# Patient Record
Sex: FEMALE | Race: WHITE | NOT HISPANIC OR LATINO | Employment: OTHER | URBAN - NONMETROPOLITAN AREA
[De-identification: names, ages, dates, MRNs, and addresses within clinical notes are randomized per-mention and may not be internally consistent; named-entity substitution may affect disease eponyms.]

---

## 2017-01-01 ENCOUNTER — INFUSION (OUTPATIENT)
Dept: ONCOLOGY | Facility: HOSPITAL | Age: 77
End: 2017-01-01

## 2017-01-01 ENCOUNTER — TELEPHONE (OUTPATIENT)
Dept: GYNECOLOGIC ONCOLOGY | Facility: CLINIC | Age: 77
End: 2017-01-01

## 2017-01-01 ENCOUNTER — APPOINTMENT (OUTPATIENT)
Dept: ONCOLOGY | Facility: HOSPITAL | Age: 77
End: 2017-01-01

## 2017-01-01 ENCOUNTER — OFFICE VISIT (OUTPATIENT)
Dept: GYNECOLOGIC ONCOLOGY | Facility: CLINIC | Age: 77
End: 2017-01-01

## 2017-01-01 ENCOUNTER — OFFICE VISIT (OUTPATIENT)
Dept: ONCOLOGY | Facility: CLINIC | Age: 77
End: 2017-01-01

## 2017-01-01 VITALS
OXYGEN SATURATION: 95 % | WEIGHT: 147.9 LBS | SYSTOLIC BLOOD PRESSURE: 134 MMHG | HEART RATE: 55 BPM | BODY MASS INDEX: 25.39 KG/M2 | TEMPERATURE: 97.6 F | RESPIRATION RATE: 18 BRPM | DIASTOLIC BLOOD PRESSURE: 75 MMHG

## 2017-01-01 VITALS
SYSTOLIC BLOOD PRESSURE: 134 MMHG | OXYGEN SATURATION: 92 % | HEART RATE: 73 BPM | WEIGHT: 148 LBS | DIASTOLIC BLOOD PRESSURE: 82 MMHG | RESPIRATION RATE: 20 BRPM | TEMPERATURE: 97 F | BODY MASS INDEX: 25.4 KG/M2

## 2017-01-01 VITALS
DIASTOLIC BLOOD PRESSURE: 73 MMHG | HEART RATE: 77 BPM | SYSTOLIC BLOOD PRESSURE: 116 MMHG | OXYGEN SATURATION: 94 % | RESPIRATION RATE: 18 BRPM | TEMPERATURE: 97.7 F | BODY MASS INDEX: 26.42 KG/M2 | WEIGHT: 153.9 LBS

## 2017-01-01 VITALS
BODY MASS INDEX: 25.11 KG/M2 | OXYGEN SATURATION: 93 % | HEART RATE: 60 BPM | DIASTOLIC BLOOD PRESSURE: 85 MMHG | TEMPERATURE: 98 F | RESPIRATION RATE: 20 BRPM | WEIGHT: 146.3 LBS | SYSTOLIC BLOOD PRESSURE: 128 MMHG

## 2017-01-01 DIAGNOSIS — T45.1X5A CHEMOTHERAPY-INDUCED PERIPHERAL NEUROPATHY (HCC): ICD-10-CM

## 2017-01-01 DIAGNOSIS — G62.0 CHEMOTHERAPY-INDUCED PERIPHERAL NEUROPATHY (HCC): ICD-10-CM

## 2017-01-01 DIAGNOSIS — C48.2 MALIGNANT NEOPLASM OF PERITONEUM (HCC): Primary | ICD-10-CM

## 2017-01-01 PROCEDURE — 96523 IRRIG DRUG DELIVERY DEVICE: CPT

## 2017-01-01 PROCEDURE — 25010000002 HEPARIN FLUSH (PORCINE) 100 UNIT/ML SOLUTION: Performed by: INTERNAL MEDICINE

## 2017-01-01 PROCEDURE — 99214 OFFICE O/P EST MOD 30 MIN: CPT | Performed by: INTERNAL MEDICINE

## 2017-01-01 PROCEDURE — 99213 OFFICE O/P EST LOW 20 MIN: CPT | Performed by: OBSTETRICS & GYNECOLOGY

## 2017-01-01 RX ORDER — SODIUM CHLORIDE 0.9 % (FLUSH) 0.9 %
10 SYRINGE (ML) INJECTION AS NEEDED
Status: CANCELLED | OUTPATIENT
Start: 2018-01-01

## 2017-01-01 RX ORDER — GABAPENTIN 300 MG/1
900 CAPSULE ORAL 3 TIMES DAILY
Qty: 270 CAPSULE | Refills: 3 | Status: ON HOLD | OUTPATIENT
Start: 2017-01-01 | End: 2018-01-01

## 2017-01-01 RX ORDER — SODIUM CHLORIDE 0.9 % (FLUSH) 0.9 %
10 SYRINGE (ML) INJECTION AS NEEDED
Status: DISCONTINUED | OUTPATIENT
Start: 2017-01-01 | End: 2017-01-01 | Stop reason: HOSPADM

## 2017-01-01 RX ORDER — SODIUM CHLORIDE 0.9 % (FLUSH) 0.9 %
10 SYRINGE (ML) INJECTION AS NEEDED
Status: CANCELLED | OUTPATIENT
Start: 2017-01-01

## 2017-01-01 RX ADMIN — HEPARIN SODIUM (PORCINE) LOCK FLUSH IV SOLN 100 UNIT/ML 500 UNITS: 100 SOLUTION at 11:15

## 2017-01-01 RX ADMIN — Medication 10 ML: at 13:39

## 2017-01-01 RX ADMIN — Medication 10 ML: at 11:15

## 2017-01-01 RX ADMIN — SODIUM CHLORIDE, PRESERVATIVE FREE 500 UNITS: 5 INJECTION INTRAVENOUS at 13:39

## 2017-01-03 ENCOUNTER — LAB (OUTPATIENT)
Dept: ONCOLOGY | Facility: CLINIC | Age: 77
End: 2017-01-03

## 2017-01-03 ENCOUNTER — OFFICE VISIT (OUTPATIENT)
Dept: ONCOLOGY | Facility: CLINIC | Age: 77
End: 2017-01-03

## 2017-01-03 ENCOUNTER — INFUSION (OUTPATIENT)
Dept: ONCOLOGY | Facility: HOSPITAL | Age: 77
End: 2017-01-03

## 2017-01-03 VITALS
OXYGEN SATURATION: 93 % | HEART RATE: 79 BPM | BODY MASS INDEX: 26.26 KG/M2 | WEIGHT: 153 LBS | TEMPERATURE: 97.9 F | DIASTOLIC BLOOD PRESSURE: 67 MMHG | RESPIRATION RATE: 18 BRPM | SYSTOLIC BLOOD PRESSURE: 102 MMHG

## 2017-01-03 DIAGNOSIS — C48.2 MALIGNANT NEOPLASM OF PERITONEUM (HCC): ICD-10-CM

## 2017-01-03 DIAGNOSIS — C48.2 MALIGNANT NEOPLASM OF PERITONEUM (HCC): Primary | ICD-10-CM

## 2017-01-03 DIAGNOSIS — C80.0 CARCINOMATOSIS (HCC): Primary | ICD-10-CM

## 2017-01-03 DIAGNOSIS — C48.2 CANCER OF PERITONEUM (HCC): ICD-10-CM

## 2017-01-03 LAB
ALBUMIN SERPL-MCNC: 4 G/DL (ref 3.4–4.8)
ALBUMIN/GLOB SERPL: 1.7 G/DL (ref 1.5–2.5)
ALP SERPL-CCNC: 89 U/L (ref 46–116)
ALT SERPL W P-5'-P-CCNC: 37 U/L (ref 10–36)
ANION GAP SERPL CALCULATED.3IONS-SCNC: 3.2 MMOL/L (ref 3.6–11.2)
AST SERPL-CCNC: 24 U/L (ref 10–30)
BASOPHILS # BLD AUTO: 0.01 10*3/MM3 (ref 0–0.3)
BASOPHILS NFR BLD AUTO: 0.2 % (ref 0–2)
BILIRUB SERPL-MCNC: 0.2 MG/DL (ref 0.2–1.8)
BUN BLD-MCNC: 24 MG/DL (ref 7–21)
BUN/CREAT SERPL: 22.2 (ref 7–25)
CALCIUM SPEC-SCNC: 8.2 MG/DL (ref 7.7–10)
CHLORIDE SERPL-SCNC: 107 MMOL/L (ref 99–112)
CO2 SERPL-SCNC: 29.8 MMOL/L (ref 24.3–31.9)
CREAT BLD-MCNC: 1.08 MG/DL (ref 0.43–1.29)
DEPRECATED RDW RBC AUTO: 53.6 FL (ref 37–54)
EOSINOPHIL # BLD AUTO: 0.02 10*3/MM3 (ref 0–0.7)
EOSINOPHIL NFR BLD AUTO: 0.4 % (ref 0–7)
ERYTHROCYTE [DISTWIDTH] IN BLOOD BY AUTOMATED COUNT: 16.4 % (ref 11.5–14.5)
GFR SERPL CREATININE-BSD FRML MDRD: 49 ML/MIN/1.73
GLOBULIN UR ELPH-MCNC: 2.3 GM/DL
GLUCOSE BLD-MCNC: 99 MG/DL (ref 70–110)
HCT VFR BLD AUTO: 32.7 % (ref 37–47)
HGB BLD-MCNC: 10.7 G/DL (ref 12–16)
IMM GRANULOCYTES # BLD: 0.12 10*3/MM3 (ref 0–0.03)
IMM GRANULOCYTES NFR BLD: 2.7 % (ref 0–0.5)
LYMPHOCYTES # BLD AUTO: 1.23 10*3/MM3 (ref 1–3)
LYMPHOCYTES NFR BLD AUTO: 27.6 % (ref 16–46)
MCH RBC QN AUTO: 30.5 PG (ref 27–33)
MCHC RBC AUTO-ENTMCNC: 32.7 G/DL (ref 33–37)
MCV RBC AUTO: 93.2 FL (ref 80–94)
MONOCYTES # BLD AUTO: 0.49 10*3/MM3 (ref 0.1–0.9)
MONOCYTES NFR BLD AUTO: 11 % (ref 0–12)
NEUTROPHILS # BLD AUTO: 2.58 10*3/MM3 (ref 1.4–6.5)
NEUTROPHILS NFR BLD AUTO: 58.1 % (ref 40–75)
OSMOLALITY SERPL CALC.SUM OF ELEC: 283.5 MOSM/KG (ref 273–305)
PLATELET # BLD AUTO: 227 10*3/MM3 (ref 130–400)
PMV BLD AUTO: 10.5 FL (ref 6–10)
POTASSIUM BLD-SCNC: 4.4 MMOL/L (ref 3.5–5.3)
PROT SERPL-MCNC: 6.3 G/DL (ref 6–8)
RBC # BLD AUTO: 3.51 10*6/MM3 (ref 4.2–5.4)
SODIUM BLD-SCNC: 140 MMOL/L (ref 135–153)
WBC NRBC COR # BLD: 4.45 10*3/MM3 (ref 4.5–12.5)

## 2017-01-03 PROCEDURE — 25010000002 HEPARIN FLUSH (PORCINE) 100 UNIT/ML SOLUTION: Performed by: INTERNAL MEDICINE

## 2017-01-03 PROCEDURE — 36591 DRAW BLOOD OFF VENOUS DEVICE: CPT | Performed by: NURSE PRACTITIONER

## 2017-01-03 PROCEDURE — 99214 OFFICE O/P EST MOD 30 MIN: CPT | Performed by: INTERNAL MEDICINE

## 2017-01-03 RX ORDER — SODIUM CHLORIDE 0.9 % (FLUSH) 0.9 %
10 SYRINGE (ML) INJECTION AS NEEDED
Status: CANCELLED | OUTPATIENT
Start: 2017-01-03

## 2017-01-03 RX ORDER — GABAPENTIN 300 MG/1
300 CAPSULE ORAL 3 TIMES DAILY
Qty: 90 CAPSULE | Refills: 5 | Status: SHIPPED | OUTPATIENT
Start: 2017-01-03 | End: 2017-01-01 | Stop reason: SDUPTHER

## 2017-01-03 RX ADMIN — HEPARIN SODIUM (PORCINE) LOCK FLUSH IV SOLN 100 UNIT/ML 500 UNITS: 100 SOLUTION at 12:25

## 2017-01-03 NOTE — PROGRESS NOTES
Name:  Aminta Yoo  :  1940  Date:  1/3/2017     REFERRING PHYSICIAN  Win Us MD    PRIMARY CARE PROVIDER   Parisa Daniels, RICARDO    REASON FOR FOLLOWUP  1. Malignant neoplasm of peritoneum      CHIEF COMPLAINT  Occasional abdominal pain, still manageable with prn Norco. Worsening fatigue and leg pains since starting the second cycle of chemotherapy.    Dear MsElo CoyZoey,    HISTORY OF PRESENT ILLNESS:   I saw Ms. Yoo in follow up today in our medical oncology clinic. As you are aware, she is a pleasant, 76 y.o., white female with a history of a (presumably benign) brain tumor (status post resection in ~) who was in her usual state of health until late summer 2016 when she started to feel increasingly full/swollen and began to experience occasional abdominal pains/discomfort. An abdominal CT scan in late 2016 identified numerous deposits of probable tumor in the mesentery consistent with carcinomatosis, and she was referred to local surgery. Endoscopies were performed that were unremarkable and, between this and a  level of > 3000, it was felt that a gynecologic malignancy (likely ovarian or primary peritoneal) was the probable underlying diagnosis. A CT-guided biopsy of some abdominal wall soft tissue in late 2016 confirmed this. She was subsequently evaluated by gynecologic oncology in [place] (who may eventually attempt a debulking laparotomy; but, initial therapy with [id number] carbo/taxol was recommended first). The patient was agreeable and began this treatment on 2016.    INTERIM HISTORY:  Ms. Yoo returns to clinic today for followup again accompanied by her . She began chemotherapy in mid-2016 and has overall been tolerating it fairly well. She does report today that, with the second, [id number] treatment, she has been getting progressively fatigued; and her legs have been bothering her more (with aches and  restlessness). She otherwise has no other complaints.    Past Medical History   Diagnosis Date   • Arthritis    • Cancer      brain tumor   • Chronic constipation    • Chronic narcotic use      Arthritis   • Chronic renal insufficiency    • Depression    • GERD (gastroesophageal reflux disease)    • Ovarian cancer        Past Surgical History   Procedure Laterality Date   • Brain tumor excision  2000     Cancerous. Treated with radiation after surgery.    • Back surgery  2012   • Colonoscopy N/A 9/30/2016     Procedure: COLONOSCOPY;  Surgeon: Win Us MD;  Location: [facility];  Service:    • Endoscopy N/A 9/30/2016     Procedure: ESOPHAGOGASTRODUODENOSCOPY;  Surgeon: Win Us MD;  Location: [facility];  Service:    • Total abdominal hysterectomy  1977     Benign disease.  One ovary remains in situ.   • Portacath placement N/A 11/4/2016     Procedure: INSERTION OF PORTACATH;  Surgeon: Win Us MD;  Location: [facility];  Service:        Social History     Social History   • Marital status:      Spouse name: N/A   • Number of children: N/A   • Years of education: N/A     Occupational History   • Not on file.     Social History Main Topics   • Smoking status: Former Smoker     Packs/day: 1.00     Years: 20.00     Quit date: 9/29/1996   • Smokeless tobacco: Never Used   • Alcohol use No   • Drug use: No   • Sexual activity: Defer     Other Topics Concern   • Not on file     Social History Narrative       Family History   Problem Relation Age of Onset   • Heart disease Mother    • Hypertension Mother    • Cancer Sister      Brain & Breast       Allergies   Allergen Reactions   • Sulfa Antibiotics Rash       Current Outpatient Prescriptions   Medication Sig Dispense Refill   • calcium acetate (PHOSLO) 667 MG capsule Take 667 mg by mouth 3 (Three) Times a Day With Meals.     • diclofenac (VOLTAREN) 75 MG EC tablet 2 (Two) Times a Day.     • FLUoxetine (PROzac) 10 MG capsule 10 mg Every Night.  0    • FLUoxetine (PROzac) 20 MG capsule Take 20 mg by mouth Daily.     • HYDROcodone-acetaminophen (NORCO) 7.5-325 MG per tablet Every 6 (Six) Hours As Needed.     • NEXIUM 40 MG capsule Every Morning Before Breakfast.     • ondansetron (ZOFRAN) 8 MG tablet Take 1 tablet by mouth Every 8 (Eight) Hours As Needed for nausea or vomiting. 30 tablet 5   • prochlorperazine (COMPAZINE) 10 MG tablet Take 1 tablet by mouth Every 6 (Six) Hours As Needed for nausea or vomiting. 30 tablet 5   • spironolactone (ALDACTONE) 25 MG tablet 25 mg 2 (Two) Times a Day.     • gabapentin (NEURONTIN) 300 MG capsule Take 1 capsule by mouth 3 (Three) Times a Day. 90 capsule 5     No current facility-administered medications for this visit.      Facility-Administered Medications Ordered in Other Visits   Medication Dose Route Frequency Provider Last Rate Last Dose   • heparin flush (porcine) 100 UNIT/ML injection 500 Units  500 Units Intravenous PRN T Jay Warner MD   500 Units at 01/03/17 1225       REVIEW OF SYSTEMS  CONSTITUTIONAL:  No fever, chills or night sweats. Progressive fatigue since starting chemotherapy, as per the HPI above.  EYES:  No blurry vision, diplopia or other vision changes.  ENT:  No hearing loss, nosebleeds or sore throat.  CARDIOVASCULAR:  No palpitations, arrhythmia, syncopal episodes or edema.  PULMONARY:  No hemoptysis, wheezing, chronic cough or shortness of breath.  GASTROINTESTINAL:  No nausea or vomiting.  Intermittent abdominal pains and intermittent constipation.  GENITOURINARY:  No hematuria, kidney stones or frequent urination.  MUSCULOSKELETAL:  Progressive bilateral leg pains, as per the HPI above.  INTEGUMENTARY: No rashes or pruritus.  ENDOCRINE:  No excessive thirst or hot flashes.  HEMATOLOGIC:  No history of free bleeding, spontaneous bleeding or clotting.  IMMUNOLOGIC:  No allergies or frequent infections.  NEUROLOGIC: No numbness, tingling, seizures or weakness.  PSYCHIATRIC:  No anxiety or  depression.    PHYSICAL EXAMINATION    Visit Vitals   • /67   • Pulse 79   • Temp 97.9 °F (36.6 °C) (Oral)   • Resp 18   • Wt 153 lb (69.4 kg)   • SpO2 93%   • BMI 26.26 kg/m2       GENERAL:  A well-developed, well-nourished, elderly, white female in no acute distress.  HEENT:  Pupils equally round and reactive to light.  Extraocular muscles intact. Positive alopecia, wearing a head scarf.  CARDIOVASCULAR:  Regular rate and rhythm.  No murmurs, gallops or rubs.  LUNGS:  Clear to auscultation bilaterally.  ABDOMEN:  Soft, nondistended with positive bowel sounds. Mild, diffuse tenderness to palpation.  EXTREMITIES:  No clubbing, cyanosis or edema bilaterally.  SKIN:  No rashes or petechiae. Powerport in place.  NEURO:  Cranial nerves grossly intact.  No focal deficits.  PSYCH:  Alert and oriented x3.    LABORATORY    Lab Results   Component Value Date    WBC 4.45 (L) 01/03/2017    HGB 10.7 (L) 01/03/2017    HCT 32.7 (L) 01/03/2017    MCV 93.2 01/03/2017     01/03/2017    NEUTROABS 2.58 01/03/2017       Lab Results   Component Value Date     01/03/2017    K 4.4 01/03/2017     01/03/2017    CO2 29.8 01/03/2017    BUN 24 (H) 01/03/2017    CREATININE 1.08 01/03/2017    GLUCOSE 99 01/03/2017    CALCIUM 8.2 01/03/2017    AST 24 01/03/2017    ALT 37 (H) 01/03/2017    ALKPHOS 89 01/03/2017    BILITOT 0.2 01/03/2017    PROTEINTOT 6.3 01/03/2017    ALBUMIN 4.00 01/03/2017      (12/27/2016): 370.3 U/mL   (11/18/2016): 2625.0 U/mL   (09/29/2016): 3277.0 U/mL    IMAGING  CT abdomen and pelvis with contrast (09/22/2016):  Impression: Findings most consistent with carcinomatosis of the mesentery with numerous deposits of probable tumor throughout the lower abdomen and pelvis. Pathologic sized adenopathy is also seen.    NM PET with fusion CT, skull base to mid-thigh (10/28/2016):  Impression: Abnormal PET fusion CT showing fairly intense hypermetabolic activity throughout the abnormal soft  tissue densities in the mesentery. This was the site that was previously biopsied. The SUV value was 7.6 (or 17.6?).    PATHOLOGY  Soft tissue, abdominal wall, needle core biopsies (10/25/2016):  Poorly differentiated carcinoma. The findings are supportive of the clinical impression of ovarian/primary peritoneal carcinoma.    IMPRESSION AND PLAN  Ms. Yoo is a 76 y.o., white female with:  1. Metastatic ovarian/primary peritoneal cancer: Initially felt to be the most likely diagnosis, given the patient's clinical presentation, imaging appearance, negative GI evaluation and extremely elevated  level (3277 U/mL on 09/29/2016); and, in late October 2016, confirmed via a CT-guided biopsy of some abdominal wall soft tissue. I have had multiple long discussions with the patient and her family regarding this diagnosis and its prognosis. They are aware that this disease is not likely to be curable; but, given her good performance status, it is treatable. A NM PET scan performed on 10/28/2016 showed no evidence of disease outside the peritoneum (although it did further suggest extensive disease within it). Once a powerport was placed, we began [id number] carboplatin/taxol (AUC 6 on day 1; 80 mg/m2 days 1,8,15) on 11/18/2016. She has received two full cycles of this regimen to date and is overall tolerating it fairly well; however, with her steadily increasing fatigue and issue #2 (see below), we will give her a week off from therapy at this time. Fortunately, despite her (hopefully manageable) side effects, the most recent repeat  level (~370 U/mL on 12/27/2016) has substantially decreased from priors (~3000+ U/mL in late September 2016), an encouraging sign that her treatment is providing her with measurable benefit. After the week break, we will restart the current treatment plan. We will see her back in clinic in one month (on day #1 of the potential fourth cycle), though at that time we will likely give her a  more extended break from therapy while she is referred back to her gyn/onc in [place] for reevaluation (and a possible exploratory/debukling laparotomy?).  2. 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 In addition to delaying the planned third cycle by one week, a Rx for neurontin 300 mg TID was provided today. Continue to monitor.  3. Abdominal pain: Secondary to issue #1. Still mild and manageable with prn Norco. Continue to monitor.  4. Constipation: Secondary to issue #1 and prn narcotics. Currently still improved with the use of stool softeners and prn laxatives. Continue to monitor.  The patient and her  were in agreement with these plans.    It is a pleasure to participate in Ms. Yoo's care.  Please do not hesitate to call with any questions or concerns that you may have.    A total of 30 minutes were spent coordinating this patient’s care in clinic today; 25 minutes of which were face-to-face with the patient and her , reviewing her interim medical history, discussing the results of recent lab work and counseling on the current treatment and followup plan.  All questions were answered to their satisfaction.    FOLLOW UP  Delay day #1 of cycle #3 of [id number] carboplatin/taxol (AUC 6 on day #1/ 80 mg/m2 on days #1,8,15) by one week. Return to our clinic in 1 month (on day #1 of the potential 4th cycle) with a CBC, CMP and repeat  level for another toxicity/symptom check. Re-refer to [place] gyn/onc at that time        This document was electronically signed by ADE Warner MD January 3, 2017 12:36 PM      CC: RICARDO Patel MD

## 2017-01-04 LAB — CANCER AG125 SERPL-ACNC: 282.5 U/ML (ref 0–38.1)

## 2017-01-05 DIAGNOSIS — C56.9 OVARIAN CANCER, UNSPECIFIED LATERALITY (HCC): ICD-10-CM

## 2017-01-10 ENCOUNTER — LAB (OUTPATIENT)
Dept: ONCOLOGY | Facility: CLINIC | Age: 77
End: 2017-01-10

## 2017-01-10 ENCOUNTER — INFUSION (OUTPATIENT)
Dept: ONCOLOGY | Facility: HOSPITAL | Age: 77
End: 2017-01-10

## 2017-01-10 VITALS
OXYGEN SATURATION: 90 % | DIASTOLIC BLOOD PRESSURE: 77 MMHG | SYSTOLIC BLOOD PRESSURE: 146 MMHG | HEART RATE: 79 BPM | WEIGHT: 154.8 LBS | TEMPERATURE: 97.7 F | RESPIRATION RATE: 16 BRPM | BODY MASS INDEX: 26.57 KG/M2

## 2017-01-10 DIAGNOSIS — C56.9 OVARIAN CANCER, UNSPECIFIED LATERALITY (HCC): Primary | ICD-10-CM

## 2017-01-10 DIAGNOSIS — C48.2 MALIGNANT NEOPLASM OF PERITONEUM (HCC): ICD-10-CM

## 2017-01-10 DIAGNOSIS — C56.9 OVARIAN CANCER, UNSPECIFIED LATERALITY (HCC): ICD-10-CM

## 2017-01-10 LAB
ALBUMIN SERPL-MCNC: 4 G/DL (ref 3.4–4.8)
ALBUMIN/GLOB SERPL: 1.6 G/DL (ref 1.5–2.5)
ALP SERPL-CCNC: 86 U/L (ref 46–116)
ALT SERPL W P-5'-P-CCNC: 29 U/L (ref 10–36)
ANION GAP SERPL CALCULATED.3IONS-SCNC: 5 MMOL/L (ref 3.6–11.2)
AST SERPL-CCNC: 21 U/L (ref 10–30)
BASOPHILS # BLD AUTO: 0.01 10*3/MM3 (ref 0–0.3)
BASOPHILS NFR BLD AUTO: 0.2 % (ref 0–2)
BILIRUB SERPL-MCNC: 0.3 MG/DL (ref 0.2–1.8)
BUN BLD-MCNC: 22 MG/DL (ref 7–21)
BUN/CREAT SERPL: 19.1 (ref 7–25)
CALCIUM SPEC-SCNC: 8.8 MG/DL (ref 7.7–10)
CHLORIDE SERPL-SCNC: 109 MMOL/L (ref 99–112)
CO2 SERPL-SCNC: 30 MMOL/L (ref 24.3–31.9)
CREAT BLD-MCNC: 1.15 MG/DL (ref 0.43–1.29)
DEPRECATED RDW RBC AUTO: 61.7 FL (ref 37–54)
EOSINOPHIL # BLD AUTO: 0.04 10*3/MM3 (ref 0–0.7)
EOSINOPHIL NFR BLD AUTO: 0.7 % (ref 0–7)
ERYTHROCYTE [DISTWIDTH] IN BLOOD BY AUTOMATED COUNT: 18.3 % (ref 11.5–14.5)
GFR SERPL CREATININE-BSD FRML MDRD: 46 ML/MIN/1.73
GLOBULIN UR ELPH-MCNC: 2.5 GM/DL
GLUCOSE BLD-MCNC: 89 MG/DL (ref 70–110)
HCT VFR BLD AUTO: 31.8 % (ref 37–47)
HGB BLD-MCNC: 10.3 G/DL (ref 12–16)
IMM GRANULOCYTES # BLD: 0.19 10*3/MM3 (ref 0–0.03)
IMM GRANULOCYTES NFR BLD: 3.5 % (ref 0–0.5)
LYMPHOCYTES # BLD AUTO: 1.23 10*3/MM3 (ref 1–3)
LYMPHOCYTES NFR BLD AUTO: 22.6 % (ref 16–46)
MCH RBC QN AUTO: 30.6 PG (ref 27–33)
MCHC RBC AUTO-ENTMCNC: 32.4 G/DL (ref 33–37)
MCV RBC AUTO: 94.4 FL (ref 80–94)
MONOCYTES # BLD AUTO: 1.06 10*3/MM3 (ref 0.1–0.9)
MONOCYTES NFR BLD AUTO: 19.5 % (ref 0–12)
NEUTROPHILS # BLD AUTO: 2.91 10*3/MM3 (ref 1.4–6.5)
NEUTROPHILS NFR BLD AUTO: 53.5 % (ref 40–75)
OSMOLALITY SERPL CALC.SUM OF ELEC: 289.6 MOSM/KG (ref 273–305)
PLATELET # BLD AUTO: 253 10*3/MM3 (ref 130–400)
PMV BLD AUTO: 10.1 FL (ref 6–10)
POTASSIUM BLD-SCNC: 4.8 MMOL/L (ref 3.5–5.3)
PROT SERPL-MCNC: 6.5 G/DL (ref 6–8)
RBC # BLD AUTO: 3.37 10*6/MM3 (ref 4.2–5.4)
SODIUM BLD-SCNC: 144 MMOL/L (ref 135–153)
WBC NRBC COR # BLD: 5.44 10*3/MM3 (ref 4.5–12.5)

## 2017-01-10 PROCEDURE — 25010000002 PALONOSETRON PER 25 MCG: Performed by: INTERNAL MEDICINE

## 2017-01-10 PROCEDURE — 96413 CHEMO IV INFUSION 1 HR: CPT

## 2017-01-10 PROCEDURE — 25010000002 CARBOPLATIN PER 50 MG: Performed by: INTERNAL MEDICINE

## 2017-01-10 PROCEDURE — 96417 CHEMO IV INFUS EACH ADDL SEQ: CPT

## 2017-01-10 PROCEDURE — 25010000002 HEPARIN FLUSH (PORCINE) 100 UNIT/ML SOLUTION: Performed by: INTERNAL MEDICINE

## 2017-01-10 PROCEDURE — 96375 TX/PRO/DX INJ NEW DRUG ADDON: CPT

## 2017-01-10 PROCEDURE — 25010000002 DEXAMETHASONE SODIUM PHOSPHATE 20 MG/5ML SOLUTION 5 ML VIAL: Performed by: INTERNAL MEDICINE

## 2017-01-10 PROCEDURE — 25010000002 DIPHENHYDRAMINE PER 50 MG: Performed by: INTERNAL MEDICINE

## 2017-01-10 PROCEDURE — 25010000002 PACLITAXEL PER 30 MG: Performed by: INTERNAL MEDICINE

## 2017-01-10 PROCEDURE — 96367 TX/PROPH/DG ADDL SEQ IV INF: CPT

## 2017-01-10 RX ORDER — FAMOTIDINE 10 MG/ML
20 INJECTION, SOLUTION INTRAVENOUS ONCE
Status: COMPLETED | OUTPATIENT
Start: 2017-01-10 | End: 2017-01-10

## 2017-01-10 RX ORDER — SODIUM CHLORIDE 9 MG/ML
250 INJECTION, SOLUTION INTRAVENOUS ONCE
Status: COMPLETED | OUTPATIENT
Start: 2017-01-10 | End: 2017-01-10

## 2017-01-10 RX ORDER — PALONOSETRON 0.05 MG/ML
0.25 INJECTION, SOLUTION INTRAVENOUS ONCE
Status: COMPLETED | OUTPATIENT
Start: 2017-01-10 | End: 2017-01-10

## 2017-01-10 RX ORDER — SODIUM CHLORIDE 0.9 % (FLUSH) 0.9 %
10 SYRINGE (ML) INJECTION AS NEEDED
Status: CANCELLED | OUTPATIENT
Start: 2017-01-17

## 2017-01-10 RX ADMIN — DIPHENHYDRAMINE HYDROCHLORIDE 25 MG: 50 INJECTION INTRAMUSCULAR; INTRAVENOUS at 11:53

## 2017-01-10 RX ADMIN — HEPARIN SODIUM (PORCINE) LOCK FLUSH IV SOLN 100 UNIT/ML 500 UNITS: 100 SOLUTION at 14:18

## 2017-01-10 RX ADMIN — SODIUM CHLORIDE 250 ML: 900 INJECTION, SOLUTION INTRAVENOUS at 11:44

## 2017-01-10 RX ADMIN — CARBOPLATIN 380 MG: 10 INJECTION, SOLUTION INTRAVENOUS at 13:33

## 2017-01-10 RX ADMIN — PALONOSETRON HYDROCHLORIDE 0.25 MG: 0.25 INJECTION INTRAVENOUS at 11:45

## 2017-01-10 RX ADMIN — FAMOTIDINE 20 MG: 10 INJECTION, SOLUTION INTRAVENOUS at 11:45

## 2017-01-10 RX ADMIN — PACLITAXEL 140 MG: 6 INJECTION, SOLUTION INTRAVENOUS at 12:31

## 2017-01-10 RX ADMIN — DEXAMETHASONE SODIUM PHOSPHATE 12 MG: 4 INJECTION, SOLUTION INTRAMUSCULAR; INTRAVENOUS at 12:11

## 2017-01-12 ENCOUNTER — TELEPHONE (OUTPATIENT)
Dept: ONCOLOGY | Facility: HOSPITAL | Age: 77
End: 2017-01-12

## 2017-01-12 NOTE — TELEPHONE ENCOUNTER
Called patient to follow up on chemotherapy treatment from Tuesday. Spoke with pts spouse José, he states that Aminta has been doing really good until this morning when she fell coming into the kitchen. He states that she is fine now. He states that she reports that she just got dizzy when she got up. José reports that he doesn't see in markings on her and she is not complaining of pain or injury. He states that she takes BP medication. He reports that she denies N/V/D. He states that she is eating and drinking without difficulty. Advised him to have her get up slowly from lying or sitting position. Advised him to please call if they anything or for any questions or concerns. He verbalized understanding. Dr. Warner aware.

## 2017-01-17 ENCOUNTER — LAB (OUTPATIENT)
Dept: ONCOLOGY | Facility: CLINIC | Age: 77
End: 2017-01-17

## 2017-01-17 ENCOUNTER — APPOINTMENT (OUTPATIENT)
Dept: ONCOLOGY | Facility: HOSPITAL | Age: 77
End: 2017-01-17

## 2017-01-17 ENCOUNTER — INFUSION (OUTPATIENT)
Dept: ONCOLOGY | Facility: HOSPITAL | Age: 77
End: 2017-01-17

## 2017-01-17 VITALS
WEIGHT: 151.8 LBS | BODY MASS INDEX: 26.06 KG/M2 | OXYGEN SATURATION: 95 % | RESPIRATION RATE: 20 BRPM | DIASTOLIC BLOOD PRESSURE: 84 MMHG | HEART RATE: 82 BPM | SYSTOLIC BLOOD PRESSURE: 133 MMHG | TEMPERATURE: 98.6 F

## 2017-01-17 DIAGNOSIS — C56.9 OVARIAN CANCER, UNSPECIFIED LATERALITY (HCC): Primary | ICD-10-CM

## 2017-01-17 DIAGNOSIS — C56.9 MALIGNANT NEOPLASM OF OVARY, UNSPECIFIED LATERALITY (HCC): Primary | ICD-10-CM

## 2017-01-17 DIAGNOSIS — C56.9 OVARIAN CANCER, UNSPECIFIED LATERALITY (HCC): ICD-10-CM

## 2017-01-17 DIAGNOSIS — C48.2 MALIGNANT NEOPLASM OF PERITONEUM (HCC): ICD-10-CM

## 2017-01-17 LAB
ALBUMIN SERPL-MCNC: 4.1 G/DL (ref 3.4–4.8)
ALBUMIN/GLOB SERPL: 1.9 G/DL (ref 1.5–2.5)
ALP SERPL-CCNC: 79 U/L (ref 46–116)
ALT SERPL W P-5'-P-CCNC: 41 U/L (ref 10–36)
ANION GAP SERPL CALCULATED.3IONS-SCNC: 10.7 MMOL/L (ref 3.6–11.2)
AST SERPL-CCNC: 23 U/L (ref 10–30)
BASOPHILS # BLD AUTO: 0.01 10*3/MM3 (ref 0–0.3)
BASOPHILS NFR BLD AUTO: 0.2 % (ref 0–2)
BILIRUB SERPL-MCNC: 0.3 MG/DL (ref 0.2–1.8)
BUN BLD-MCNC: 26 MG/DL (ref 7–21)
BUN/CREAT SERPL: 21.1 (ref 7–25)
CALCIUM SPEC-SCNC: 8.6 MG/DL (ref 7.7–10)
CHLORIDE SERPL-SCNC: 107 MMOL/L (ref 99–112)
CO2 SERPL-SCNC: 27.3 MMOL/L (ref 24.3–31.9)
CREAT BLD-MCNC: 1.23 MG/DL (ref 0.43–1.29)
DEPRECATED RDW RBC AUTO: 58.1 FL (ref 37–54)
EOSINOPHIL # BLD AUTO: 0.03 10*3/MM3 (ref 0–0.7)
EOSINOPHIL NFR BLD AUTO: 0.5 % (ref 0–7)
ERYTHROCYTE [DISTWIDTH] IN BLOOD BY AUTOMATED COUNT: 17.6 % (ref 11.5–14.5)
GFR SERPL CREATININE-BSD FRML MDRD: 42 ML/MIN/1.73
GLOBULIN UR ELPH-MCNC: 2.2 GM/DL
GLUCOSE BLD-MCNC: 87 MG/DL (ref 70–110)
HCT VFR BLD AUTO: 34.3 % (ref 37–47)
HGB BLD-MCNC: 10.8 G/DL (ref 12–16)
IMM GRANULOCYTES # BLD: 0.08 10*3/MM3 (ref 0–0.03)
IMM GRANULOCYTES NFR BLD: 1.4 % (ref 0–0.5)
LYMPHOCYTES # BLD AUTO: 1.28 10*3/MM3 (ref 1–3)
LYMPHOCYTES NFR BLD AUTO: 22.5 % (ref 16–46)
MCH RBC QN AUTO: 30.1 PG (ref 27–33)
MCHC RBC AUTO-ENTMCNC: 31.5 G/DL (ref 33–37)
MCV RBC AUTO: 95.5 FL (ref 80–94)
MONOCYTES # BLD AUTO: 0.42 10*3/MM3 (ref 0.1–0.9)
MONOCYTES NFR BLD AUTO: 7.4 % (ref 0–12)
NEUTROPHILS # BLD AUTO: 3.86 10*3/MM3 (ref 1.4–6.5)
NEUTROPHILS NFR BLD AUTO: 68 % (ref 40–75)
OSMOLALITY SERPL CALC.SUM OF ELEC: 292.8 MOSM/KG (ref 273–305)
PLATELET # BLD AUTO: 276 10*3/MM3 (ref 130–400)
PMV BLD AUTO: 10.5 FL (ref 6–10)
POTASSIUM BLD-SCNC: 4.4 MMOL/L (ref 3.5–5.3)
PROT SERPL-MCNC: 6.3 G/DL (ref 6–8)
RBC # BLD AUTO: 3.59 10*6/MM3 (ref 4.2–5.4)
SODIUM BLD-SCNC: 145 MMOL/L (ref 135–153)
WBC NRBC COR # BLD: 5.68 10*3/MM3 (ref 4.5–12.5)

## 2017-01-17 PROCEDURE — 96413 CHEMO IV INFUSION 1 HR: CPT

## 2017-01-17 PROCEDURE — 25010000002 HEPARIN FLUSH (PORCINE) 100 UNIT/ML SOLUTION: Performed by: INTERNAL MEDICINE

## 2017-01-17 PROCEDURE — 25010000002 DEXAMETHASONE SODIUM PHOSPHATE 20 MG/5ML SOLUTION 5 ML VIAL: Performed by: INTERNAL MEDICINE

## 2017-01-17 PROCEDURE — 25010000002 DIPHENHYDRAMINE PER 50 MG: Performed by: INTERNAL MEDICINE

## 2017-01-17 PROCEDURE — 96361 HYDRATE IV INFUSION ADD-ON: CPT

## 2017-01-17 PROCEDURE — 36415 COLL VENOUS BLD VENIPUNCTURE: CPT

## 2017-01-17 PROCEDURE — 25010000002 PACLITAXEL PER 30 MG: Performed by: INTERNAL MEDICINE

## 2017-01-17 PROCEDURE — 96375 TX/PRO/DX INJ NEW DRUG ADDON: CPT

## 2017-01-17 PROCEDURE — 96367 TX/PROPH/DG ADDL SEQ IV INF: CPT

## 2017-01-17 RX ORDER — FAMOTIDINE 10 MG/ML
20 INJECTION, SOLUTION INTRAVENOUS ONCE
Status: COMPLETED | OUTPATIENT
Start: 2017-01-17 | End: 2017-01-17

## 2017-01-17 RX ORDER — SODIUM CHLORIDE 9 MG/ML
250 INJECTION, SOLUTION INTRAVENOUS ONCE
Status: COMPLETED | OUTPATIENT
Start: 2017-01-17 | End: 2017-01-17

## 2017-01-17 RX ORDER — SODIUM CHLORIDE 0.9 % (FLUSH) 0.9 %
10 SYRINGE (ML) INJECTION AS NEEDED
Status: CANCELLED | OUTPATIENT
Start: 2017-01-24

## 2017-01-17 RX ADMIN — DEXAMETHASONE SODIUM PHOSPHATE 12 MG: 4 INJECTION, SOLUTION INTRAMUSCULAR; INTRAVENOUS at 11:38

## 2017-01-17 RX ADMIN — PACLITAXEL 140 MG: 6 INJECTION, SOLUTION INTRAVENOUS at 12:11

## 2017-01-17 RX ADMIN — DIPHENHYDRAMINE HYDROCHLORIDE 25 MG: 50 INJECTION INTRAMUSCULAR; INTRAVENOUS at 11:24

## 2017-01-17 RX ADMIN — HEPARIN SODIUM (PORCINE) LOCK FLUSH IV SOLN 100 UNIT/ML 500 UNITS: 100 SOLUTION at 13:30

## 2017-01-17 RX ADMIN — FAMOTIDINE 20 MG: 10 INJECTION, SOLUTION INTRAVENOUS at 11:19

## 2017-01-17 RX ADMIN — SODIUM CHLORIDE 250 ML: 9 INJECTION, SOLUTION INTRAVENOUS at 11:19

## 2017-01-17 NOTE — PROGRESS NOTES
GYN Oncology    Patient just started C3 of therapy and will have D15 on 1/24.  Typically I would consider interval debulk after 3 cycles.  I have notified Dr. Warner that I I would like to see her for repeat imaging, exam, and treatment planning discussion on either 1/27 or 1/30.  Orders placed.  Scan may be done closer to home if that is easier for her.  I'll have my office call her to coordinate the visit and her scan.     I tried calling the patient and her family at all of the available numbers and was unable to reach anyone today.  I will have my office continue trying.    Jailene Tran MD  01/17/17  2:12 PM

## 2017-01-18 ENCOUNTER — TELEPHONE (OUTPATIENT)
Dept: ONCOLOGY | Facility: HOSPITAL | Age: 77
End: 2017-01-18

## 2017-01-24 ENCOUNTER — LAB (OUTPATIENT)
Dept: ONCOLOGY | Facility: CLINIC | Age: 77
End: 2017-01-24

## 2017-01-24 ENCOUNTER — INFUSION (OUTPATIENT)
Dept: ONCOLOGY | Facility: HOSPITAL | Age: 77
End: 2017-01-24

## 2017-01-24 VITALS
WEIGHT: 150.9 LBS | BODY MASS INDEX: 25.9 KG/M2 | RESPIRATION RATE: 20 BRPM | DIASTOLIC BLOOD PRESSURE: 80 MMHG | TEMPERATURE: 98.4 F | OXYGEN SATURATION: 96 % | SYSTOLIC BLOOD PRESSURE: 138 MMHG | HEART RATE: 73 BPM

## 2017-01-24 DIAGNOSIS — C56.9 MALIGNANT NEOPLASM OF OVARY, UNSPECIFIED LATERALITY (HCC): Primary | ICD-10-CM

## 2017-01-24 DIAGNOSIS — C56.9 OVARIAN CANCER, UNSPECIFIED LATERALITY (HCC): ICD-10-CM

## 2017-01-24 DIAGNOSIS — C48.2 MALIGNANT NEOPLASM OF PERITONEUM (HCC): ICD-10-CM

## 2017-01-24 DIAGNOSIS — C56.9 OVARIAN CANCER, UNSPECIFIED LATERALITY (HCC): Primary | ICD-10-CM

## 2017-01-24 LAB
ALBUMIN SERPL-MCNC: 4 G/DL (ref 3.4–4.8)
ALBUMIN/GLOB SERPL: 1.9 G/DL (ref 1.5–2.5)
ALP SERPL-CCNC: 76 U/L (ref 46–116)
ALT SERPL W P-5'-P-CCNC: 30 U/L (ref 10–36)
ANION GAP SERPL CALCULATED.3IONS-SCNC: 8.5 MMOL/L (ref 3.6–11.2)
AST SERPL-CCNC: 22 U/L (ref 10–30)
BASOPHILS # BLD AUTO: 0.02 10*3/MM3 (ref 0–0.3)
BASOPHILS NFR BLD AUTO: 0.4 % (ref 0–2)
BILIRUB SERPL-MCNC: 0.3 MG/DL (ref 0.2–1.8)
BUN BLD-MCNC: 23 MG/DL (ref 7–21)
BUN/CREAT SERPL: 19.3 (ref 7–25)
CALCIUM SPEC-SCNC: 8.2 MG/DL (ref 7.7–10)
CHLORIDE SERPL-SCNC: 111 MMOL/L (ref 99–112)
CO2 SERPL-SCNC: 26.5 MMOL/L (ref 24.3–31.9)
CREAT BLD-MCNC: 1.19 MG/DL (ref 0.43–1.29)
DEPRECATED RDW RBC AUTO: 60.1 FL (ref 37–54)
EOSINOPHIL # BLD AUTO: 0.02 10*3/MM3 (ref 0–0.7)
EOSINOPHIL NFR BLD AUTO: 0.4 % (ref 0–7)
ERYTHROCYTE [DISTWIDTH] IN BLOOD BY AUTOMATED COUNT: 18.6 % (ref 11.5–14.5)
GFR SERPL CREATININE-BSD FRML MDRD: 44 ML/MIN/1.73
GLOBULIN UR ELPH-MCNC: 2.1 GM/DL
GLUCOSE BLD-MCNC: 101 MG/DL (ref 70–110)
HCT VFR BLD AUTO: 32.6 % (ref 37–47)
HGB BLD-MCNC: 10.3 G/DL (ref 12–16)
IMM GRANULOCYTES # BLD: 0.12 10*3/MM3 (ref 0–0.03)
IMM GRANULOCYTES NFR BLD: 2.6 % (ref 0–0.5)
LYMPHOCYTES # BLD AUTO: 1.26 10*3/MM3 (ref 1–3)
LYMPHOCYTES NFR BLD AUTO: 27.7 % (ref 16–46)
MCH RBC QN AUTO: 30.5 PG (ref 27–33)
MCHC RBC AUTO-ENTMCNC: 31.6 G/DL (ref 33–37)
MCV RBC AUTO: 96.4 FL (ref 80–94)
MONOCYTES # BLD AUTO: 0.46 10*3/MM3 (ref 0.1–0.9)
MONOCYTES NFR BLD AUTO: 10.1 % (ref 0–12)
NEUTROPHILS # BLD AUTO: 2.67 10*3/MM3 (ref 1.4–6.5)
NEUTROPHILS NFR BLD AUTO: 58.8 % (ref 40–75)
OSMOLALITY SERPL CALC.SUM OF ELEC: 294.4 MOSM/KG (ref 273–305)
PLATELET # BLD AUTO: 195 10*3/MM3 (ref 130–400)
PMV BLD AUTO: 10.3 FL (ref 6–10)
POTASSIUM BLD-SCNC: 4 MMOL/L (ref 3.5–5.3)
PROT SERPL-MCNC: 6.1 G/DL (ref 6–8)
RBC # BLD AUTO: 3.38 10*6/MM3 (ref 4.2–5.4)
SODIUM BLD-SCNC: 146 MMOL/L (ref 135–153)
WBC NRBC COR # BLD: 4.55 10*3/MM3 (ref 4.5–12.5)

## 2017-01-24 PROCEDURE — 25010000002 HEPARIN FLUSH (PORCINE) 100 UNIT/ML SOLUTION: Performed by: INTERNAL MEDICINE

## 2017-01-24 PROCEDURE — 96375 TX/PRO/DX INJ NEW DRUG ADDON: CPT

## 2017-01-24 PROCEDURE — 25010000002 DIPHENHYDRAMINE PER 50 MG: Performed by: INTERNAL MEDICINE

## 2017-01-24 PROCEDURE — 25010000002 PACLITAXEL PER 30 MG: Performed by: INTERNAL MEDICINE

## 2017-01-24 PROCEDURE — 96367 TX/PROPH/DG ADDL SEQ IV INF: CPT

## 2017-01-24 PROCEDURE — 96413 CHEMO IV INFUSION 1 HR: CPT

## 2017-01-24 PROCEDURE — 96361 HYDRATE IV INFUSION ADD-ON: CPT

## 2017-01-24 PROCEDURE — 25010000002 DEXAMETHASONE SODIUM PHOSPHATE 20 MG/5ML SOLUTION 5 ML VIAL: Performed by: INTERNAL MEDICINE

## 2017-01-24 PROCEDURE — 36415 COLL VENOUS BLD VENIPUNCTURE: CPT

## 2017-01-24 RX ORDER — FAMOTIDINE 10 MG/ML
20 INJECTION, SOLUTION INTRAVENOUS ONCE
Status: COMPLETED | OUTPATIENT
Start: 2017-01-24 | End: 2017-01-24

## 2017-01-24 RX ORDER — SODIUM CHLORIDE 9 MG/ML
250 INJECTION, SOLUTION INTRAVENOUS ONCE
Status: COMPLETED | OUTPATIENT
Start: 2017-01-24 | End: 2017-01-24

## 2017-01-24 RX ORDER — SODIUM CHLORIDE 0.9 % (FLUSH) 0.9 %
10 SYRINGE (ML) INJECTION AS NEEDED
Status: CANCELLED | OUTPATIENT
Start: 2017-01-31

## 2017-01-24 RX ADMIN — FAMOTIDINE 20 MG: 10 INJECTION, SOLUTION INTRAVENOUS at 11:09

## 2017-01-24 RX ADMIN — HEPARIN SODIUM (PORCINE) LOCK FLUSH IV SOLN 100 UNIT/ML 500 UNITS: 100 SOLUTION at 13:13

## 2017-01-24 RX ADMIN — SODIUM CHLORIDE 250 ML: 9 INJECTION, SOLUTION INTRAVENOUS at 11:08

## 2017-01-24 RX ADMIN — DEXAMETHASONE SODIUM PHOSPHATE 12 MG: 4 INJECTION, SOLUTION INTRAMUSCULAR; INTRAVENOUS at 11:30

## 2017-01-24 RX ADMIN — PACLITAXEL 140 MG: 6 INJECTION, SOLUTION INTRAVENOUS at 11:55

## 2017-01-24 RX ADMIN — DIPHENHYDRAMINE HYDROCHLORIDE 25 MG: 50 INJECTION INTRAMUSCULAR; INTRAVENOUS at 11:15

## 2017-01-25 ENCOUNTER — TELEPHONE (OUTPATIENT)
Dept: ONCOLOGY | Facility: HOSPITAL | Age: 77
End: 2017-01-25

## 2017-01-29 DIAGNOSIS — C56.9 OVARIAN CANCER, UNSPECIFIED LATERALITY (HCC): ICD-10-CM

## 2017-01-29 RX ORDER — PALONOSETRON 0.05 MG/ML
0.25 INJECTION, SOLUTION INTRAVENOUS ONCE
Status: CANCELLED | OUTPATIENT
Start: 2017-01-31

## 2017-01-29 RX ORDER — SODIUM CHLORIDE 9 MG/ML
250 INJECTION, SOLUTION INTRAVENOUS ONCE
Status: CANCELLED | OUTPATIENT
Start: 2017-01-31

## 2017-01-29 RX ORDER — FAMOTIDINE 10 MG/ML
20 INJECTION, SOLUTION INTRAVENOUS ONCE
Status: CANCELLED | OUTPATIENT
Start: 2017-02-06

## 2017-01-29 RX ORDER — SODIUM CHLORIDE 9 MG/ML
250 INJECTION, SOLUTION INTRAVENOUS ONCE
Status: CANCELLED | OUTPATIENT
Start: 2017-02-13

## 2017-01-29 RX ORDER — SODIUM CHLORIDE 9 MG/ML
250 INJECTION, SOLUTION INTRAVENOUS ONCE
Status: CANCELLED | OUTPATIENT
Start: 2017-02-06

## 2017-01-29 RX ORDER — FAMOTIDINE 10 MG/ML
20 INJECTION, SOLUTION INTRAVENOUS ONCE
Status: CANCELLED | OUTPATIENT
Start: 2017-02-13

## 2017-01-29 RX ORDER — FAMOTIDINE 10 MG/ML
20 INJECTION, SOLUTION INTRAVENOUS ONCE
Status: CANCELLED | OUTPATIENT
Start: 2017-01-31

## 2017-01-30 ENCOUNTER — LAB (OUTPATIENT)
Dept: LAB | Facility: HOSPITAL | Age: 77
End: 2017-01-30

## 2017-01-30 ENCOUNTER — OFFICE VISIT (OUTPATIENT)
Dept: GYNECOLOGIC ONCOLOGY | Facility: CLINIC | Age: 77
End: 2017-01-30

## 2017-01-30 ENCOUNTER — HOSPITAL ENCOUNTER (OUTPATIENT)
Dept: CT IMAGING | Facility: HOSPITAL | Age: 77
Discharge: HOME | End: 2017-01-30
Attending: OBSTETRICS & GYNECOLOGY | Admitting: OBSTETRICS & GYNECOLOGY

## 2017-01-30 VITALS
SYSTOLIC BLOOD PRESSURE: 147 MMHG | OXYGEN SATURATION: 98 % | BODY MASS INDEX: 26.09 KG/M2 | RESPIRATION RATE: 20 BRPM | WEIGHT: 152 LBS | DIASTOLIC BLOOD PRESSURE: 67 MMHG | TEMPERATURE: 97.6 F | HEART RATE: 93 BPM

## 2017-01-30 DIAGNOSIS — C48.2 MALIGNANT NEOPLASM OF PERITONEUM (HCC): Primary | ICD-10-CM

## 2017-01-30 DIAGNOSIS — C56.9 OVARIAN CANCER, UNSPECIFIED LATERALITY (HCC): ICD-10-CM

## 2017-01-30 LAB
BASOPHILS # BLD AUTO: 0.01 10*3/MM3 (ref 0–0.2)
BASOPHILS NFR BLD AUTO: 0.2 % (ref 0–1)
DEPRECATED RDW RBC AUTO: 65 FL (ref 37–54)
EOSINOPHIL # BLD AUTO: 0.01 10*3/MM3 (ref 0.1–0.3)
EOSINOPHIL NFR BLD AUTO: 0.2 % (ref 0–3)
ERYTHROCYTE [DISTWIDTH] IN BLOOD BY AUTOMATED COUNT: 18.7 % (ref 11.3–14.5)
HCT VFR BLD AUTO: 34 % (ref 34.5–44)
HGB BLD-MCNC: 11.2 G/DL (ref 11.5–15.5)
IMM GRANULOCYTES # BLD: 0.05 10*3/MM3 (ref 0–0.03)
IMM GRANULOCYTES NFR BLD: 1 % (ref 0–0.6)
LYMPHOCYTES # BLD AUTO: 1.45 10*3/MM3 (ref 0.6–4.8)
LYMPHOCYTES NFR BLD AUTO: 28.9 % (ref 24–44)
MCH RBC QN AUTO: 32.3 PG (ref 27–31)
MCHC RBC AUTO-ENTMCNC: 32.9 G/DL (ref 32–36)
MCV RBC AUTO: 98 FL (ref 80–99)
MONOCYTES # BLD AUTO: 0.24 10*3/MM3 (ref 0–1)
MONOCYTES NFR BLD AUTO: 4.8 % (ref 0–12)
NEUTROPHILS # BLD AUTO: 3.25 10*3/MM3 (ref 1.5–8.3)
NEUTROPHILS NFR BLD AUTO: 64.9 % (ref 41–71)
PLATELET # BLD AUTO: 169 10*3/MM3 (ref 150–450)
PMV BLD AUTO: 10.2 FL (ref 6–12)
RBC # BLD AUTO: 3.47 10*6/MM3 (ref 3.89–5.14)
WBC NRBC COR # BLD: 5.01 10*3/MM3 (ref 3.5–10.8)

## 2017-01-30 PROCEDURE — 85025 COMPLETE CBC W/AUTO DIFF WBC: CPT

## 2017-01-30 PROCEDURE — 99214 OFFICE O/P EST MOD 30 MIN: CPT | Performed by: OBSTETRICS & GYNECOLOGY

## 2017-01-30 PROCEDURE — 0 IOPAMIDOL 61 % SOLUTION: Performed by: OBSTETRICS & GYNECOLOGY

## 2017-01-30 PROCEDURE — 74177 CT ABD & PELVIS W/CONTRAST: CPT

## 2017-01-30 PROCEDURE — 36415 COLL VENOUS BLD VENIPUNCTURE: CPT

## 2017-01-30 RX ADMIN — IOPAMIDOL 50 ML: 612 INJECTION, SOLUTION INTRAVENOUS at 08:50

## 2017-01-30 RX ADMIN — BARIUM SULFATE 450 ML: 21 SUSPENSION ORAL at 07:40

## 2017-01-30 NOTE — MR AVS SNAPSHOT
Aminta Yoo   1/30/2017 1:00 PM   Office Visit    Dept Phone:  [phone]   Encounter #:  [id number]    Provider:  Jailene Tran MD   Department:  [facility] GYNECOLOGIC ONCOLOGY                Your Full Care Plan              Your Updated Medication List          This list is accurate as of: 1/30/17  1:15 PM.  Always use your most recent med list.                calcium acetate 667 MG capsule   Commonly known as:  PHOSLO       diclofenac 75 MG EC tablet   Commonly known as:  VOLTAREN       * FLUoxetine 10 MG capsule   Commonly known as:  PROzac       * FLUoxetine 20 MG capsule   Commonly known as:  PROzac       gabapentin 300 MG capsule   Commonly known as:  NEURONTIN   Take 1 capsule by mouth 3 (Three) Times a Day.       HYDROcodone-acetaminophen 7.5-325 MG per tablet   Commonly known as:  NORCO       NEXIUM 40 MG capsule   Generic drug:  esomeprazole       ondansetron 8 MG tablet   Commonly known as:  ZOFRAN   Take 1 tablet by mouth Every 8 (Eight) Hours As Needed for nausea or vomiting.       prochlorperazine 10 MG tablet   Commonly known as:  COMPAZINE   Take 1 tablet by mouth Every 6 (Six) Hours As Needed for nausea or vomiting.       spironolactone 25 MG tablet   Commonly known as:  ALDACTONE       * Notice:  This list has 2 medication(s) that are the same as other medications prescribed for you. Read the directions carefully, and ask your doctor or other care provider to review them with you.            You Were Diagnosed With        Codes Comments    Malignant neoplasm of peritoneum    -  Primary ICD-10-CM: C48.2  ICD-9-CM: 158.9       Instructions     None    Patient Instructions History      Upcoming Appointments     Visit Type Date Time Department    FOLLOW UP 1 UNIT 1/30/2017  1:00 PM [facility]    CT [facility] ABD PELVIS W CONTRAST 1/30/2017  8:00 AM  [facility]    LAB 1/30/2017  9:15 AM  [facility]    LAB 1/31/2017 10:30 AM [facility]     INFUSION 2 HR 1/31/2017 10:30 AM  [facility]    FOLLOW UP 2/7/2017 11:00 AM [facility]      MyChart Signup     Our records indicate that you have declined [facility] MyChart signup. If you would like to sign up for MyChart, please email [email or web address] or call [phone] to obtain an activation code.             Other Info from Your Visit           Your Appointments     Jan 31, 2017 10:30 AM EST   LAB with [facility] NURSE LAB   [facility] HEMATOLOGY  AND ONCOLOGY ([place])    [address] [place] [address] [place] [address]   [phone]            Jan 31, 2017 10:30 AM EST   INFUSION with  COR OP INFUS CHAIR 8   [facility] OUTPATIENT INFUSION ([place])    [address] [place] [address] [place] [address]   [phone]            Feb 07, 2017 11:00 AM EST   Follow Up with ADE Warner MD   [facility] HEMATOLOGY  AND ONCOLOGY ([place])    [address] [place] [address] [place] [address]   [phone]           Arrive 15 minutes prior to appointment.              Allergies     Sulfa Antibiotics  Rash      Reason for Visit     Follow-up ct talk      Vital Signs     Blood Pressure Pulse Temperature Respirations Weight Oxygen Saturation    147/67 93 97.6 °F (36.4 °C) (Temporal Artery ) 20 152 lb (68.9 kg) 98%    Body Mass Index Smoking Status                26.09 kg/m2 Former Smoker          Problems and Diagnoses Noted     Malignant neoplasm of peritoneum

## 2017-01-30 NOTE — PROGRESS NOTES
Subjective     Reason for visit:  Treatment planning     History of present illness:  The patient is a 77 y.o. female with clinical stage 3C primary peritoneal cancer.  Her treatment history is outlined below.  She is being treated with a neoadjuvant approach and is receiving chemotherapy with Dr. Warner of Hematology Oncology at [facility].  As of 1/24/16 she received C3D15 of dose dense Carbo/Taxol.  She presents today for a CT scan and treatment planning.    Since her last visit the patient and her family report she has tolerated chemotherapy reasonably well.  She notes dizziness and weakness at times although states this is improved with therapy.  She did have some minor issues with balance where she had a small fall in her kitchen.  She has since been using a walker with good effect.  She reports weight loss.  Less appetite.  Occasional nausea.  No vomiting.  Normal bladder function.  Mild constipation.  Mild vaginal discharge.  No bleeding.  No chest pain or shortness of breath.  No fevers or chills.  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  They tell me she required one treatment adjustment thus far secondary to toxicity and weakness (one week off during C2).     Treatment History:    1.  She presented with carcinomatosis and an elevated CA-125 to greater than 3000.  She had a negative colonoscopy and EGD with Dr. Us of General Surgery.  She was then referred to Dr. Warner of Hematology Oncology at [facility] and was referred to Gynecologic Oncology.  2.  Imaging on presentation showed bulky disease in the root of the mesentery and numerous miliary deposits.  The tumor plaque at the vaginal cuff and anteriorly in the vagina felt as though it would require significant local dissection in order to resect.  Optimal upfront debulking surgery was felt not to be possible or ideal in this elderly patient and recommendation was made for a neoadjuvant approach.  3.  Soft tissue biopsy showed  poorly differentiated carcinoma, favor adenocarcinoma  4.  She received her first cycle of treatment on 11/18/16      OBGYN History: NSVDx2.  She underwent a hysterectomy in the 70s for benign disease.  She believes one ovary remains in situ.    Past Medical History   Diagnosis Date   • Arthritis    • Cancer      brain tumor   • Chronic constipation    • Chronic narcotic use      Arthritis   • Chronic renal insufficiency    • Depression    • GERD (gastroesophageal reflux disease)    • Ovarian cancer      Past Surgical History   Procedure Laterality Date   • Brain tumor excision  2000     Cancerous. Treated with radiation after surgery.    • Back surgery  2012   • Colonoscopy N/A 9/30/2016     Procedure: COLONOSCOPY;  Surgeon: Win Us MD;  Location: [facility] OR;  Service:    • Endoscopy N/A 9/30/2016     Procedure: ESOPHAGOGASTRODUODENOSCOPY;  Surgeon: Win Us MD;  Location: [facility] OR;  Service:    • Total abdominal hysterectomy  1977     Benign disease.  One ovary remains in situ.   • Portacath placement N/A 11/4/2016     Procedure: INSERTION OF PORTACATH;  Surgeon: Win Us MD;  Location: [facility] OR;  Service:      MEDICATIONS: The current medication list was reviewed with the patient and updated in the EMR this date per the Medical Assistant. Medication dosages and frequencies were confirmed to be accurate.      Allergies:  is allergic to sulfa antibiotics.    Social History: She is  and retired.  She lives 2.5 hours away.  They live 45 minutes from [facility].  She denies current use of tobacco, alcohol, and illicit drugs.  She is a former 20-pack-year smoker.     Family History:  Her sister suffered from breast cancer in her 50s.  Denies a history of colon, endometrial, ovarian, and prostate cancer.  No history of melanoma.    Health Maintenance:    1. Mammogram - More than 5 years ago  2. Colonoscopy - 2016    Review of Systems:  CONSTITUTIONAL:  Weight has mostly been stable, +G1  fatigue.  No fever, chills, night sweats.  PSYCHIATRIC: No history of anxiety.  +depression. No bipolar disorder or insomnia.  EYES: Vision is unchanged.  No photophobia.  EARS, NOSE, MOUTH, AND THROAT: Hearing normal. No swallowing difficulties.  No sore throat.  ENDOCRINE: No history of diabetes or thyroid disease.  LYMPHATIC: No enlarged lymph nodes  RESPIRATORY: No shortness of breath, cough, asthma or wheezing.  No hemoptysis.  CARDIOVASCULAR: No angina, orthopnea, or edema.  No HTN.  No hyperlipidemia.  GASTROINTESTINAL: +G1 constipation.  No diarrhea, or melena.  +reflux.  No current nausea, vomiting.  GENITOURINARY: No dysuria, hematuria, urgency, or frequency.  NEUROLOGIC: +G1 weakness.  +G1 neuropathy.  No syncope, seizure, or headaches.  MUSCULOSKELETAL: +joint pain.  +G1 muscle weakness.  INTEGUMENTARY: No new skin lesions.  GYNECOLOGIC: Per history of present illness.  HEMATOLOGIC: No bleeding problems.  Denies easy bruising.    Objective      Physical Exam  Vitals:    01/30/17 1239   BP: 147/67   Pulse: 93   Resp: 20   Temp: 97.6 °F (36.4 °C)   SpO2: 98%     Body mass index is 26.09 kg/(m^2).    Wt Readings from Last 3 Encounters:   01/30/17 152 lb (68.9 kg)   01/24/17 150 lb 14.4 oz (68.4 kg)   01/17/17 151 lb 12.8 oz (68.9 kg)   10/19/16 weight was 155 when I met her    ECOG PS 1.  She needed assistance re-dressing after the pelvic exam.    GENERAL: Alert, well-appearing female in no apparent distress.  She appears her stated age.  She is here with her son and .  HEENT: Sclera anicteric, normal eyelids. Head normocephalic, atraumatic. Mucus membranes moist.  Normal dentition.    NECK: Trachea midline, supple, without masses.  No thyromegaly.  She has some bony asymmetric right clavicular fullness that she tells me is chronic and stable.     CHEST: PAC right anterior chest c/d/i not accessed  CARDIOVASCULAR: Normal rate, regular rhythm, no murmurs, rubs, or gallops.  Extremities symmetric.  No  peripheral edema.  RESPIRATORY: Clear to auscultation bilaterally, normal respiratory effort  GASTROINTESTINAL:  Soft, non-tender, mildly distended, mostly secondary to habitus, no rebound or guarding, or hernias.  No masses.  No HSM.  Incisions - well healed infra-umbilical right paramedian incision.  MUSCULOSKELETAL:  Normal gait and station.  FROMx4.  No digital cyanosis.    SKIN:  Warm, dry, well-perfused.  All visible areas intact.  No rashes, lesions, ulcers.  PSYCHIATRIC: AO x3, with appropriate affect, normal thought processes  LYMPHATICS:  No cervical or inguinal adenopathy noted.  PELVIC exam:  Normal external female genitalia without lesions or skin changes.  Urethra midline.  She has a visible grade 2 rectocele, grade 3 on valsalva.  Vagina without lesions.  Cuff intact, well-healed, and without visible lesions.  On bimanual exam the prior plaque just proximal to the vaginal cuff has resolved.  No palpable abdominal masses.  Rectovaginal exam confirmatory. +stool in the vault. The pelvic sidewalls are smooth, the cul de sac is clear, the rectovaginal septum is supple.      Diagnostic Data:    Lab Results   Component Value Date    WBC 5.01 01/30/2017    HGB 11.2 (L) 01/30/2017    HCT 34.0 (L) 01/30/2017    MCV 98.0 01/30/2017     01/30/2017    NEUTROABS 3.25 01/30/2017    GLUCOSE 101 01/24/2017    BUN 23 (H) 01/24/2017    CREATININE 1.19 01/24/2017    EGFRIFNONA 44 (L) 01/24/2017     01/24/2017    K 4.0 01/24/2017     01/24/2017    CO2 26.5 01/24/2017    CALCIUM 8.2 01/24/2017    ALBUMIN 4.00 01/24/2017    AST 22 01/24/2017    ALT 30 01/24/2017    BILITOT 0.3 01/24/2017     282.5 (H) 01/03/2017     Lab Results   Component Value Date    CEA 1.60 09/29/2016     Lab Results   Component Value Date     282.5 (H) 01/03/2017     370.3 (H) 12/27/2016     2625.0 (H) 11/18/2016     3277.0 (H) 09/29/2016       Prior outside hospital CT scan of the abdomen and pelvis with  contrast report from 9/22/16 indicates normal lung bases.  Probable liver cysts.  Stable renal cysts.  A soft tissue mass measuring 2 cm in the retroperitoneum along with multiple small lymph nodes.  Multiple large soft tissue masses within the mesentery likely consistent with omental caking as well as numerous soft tissue masses.  No bowel dilation.  Small amount of free fluid present in the pelvis area did no specific comments on pelvic masses area.  I was able to have these images loaded into our system and review them.  I concur that the majority of her disease is omental and mesenteric and seemingly involves the root of the mesentery.  There is no dominant soft tissue mass in the pelvis.  There is only scant free fluid present and no overt ascites.  There is potentially minimal periaortic lymphadenopathy.     PET 10/28/16  IMPRESSION:  Abnormal PET fusion CT showing fairly intense hypermetabolic  activity throughout the abnormal soft tissue densities in the mesentery.  This was the site that was previously biopsied. The SUV value was 7.6.    CT Abdomen/Pelvis 1/30/17  FINDINGS:   ABDOMEN: Stable scarring identified at the right lung base. Several  small pleural-based nodules seen at the right lung base which are  stable. The liver is homogeneous in appearance. Several low-density  lesions identified in the left lobe of the liver suggesting cysts which  are stable. No stones in the gallbladder. Several renal cortical cysts  identified bilaterally. Some scarring identified of the upper pole of  the left kidney which is stable. No hydronephrosis. Both adrenal glands  are unremarkable in appearance. The pancreas is homogeneous. No  abdominal or retroperitoneal lymphadenopathy. Vascular calcification  seen within the abdominal aorta and iliac vessels. There appears to be  interval decrease seen in the omental caking anteriorly within the  rightward aspect of the mid and lower abdomen. No free fluid or free  air. The  abdominal portions of the gastrointestinal tract are within  normal limits.  PELVIS: The pelvic organs are unremarkable. The pelvic portions of the  gastrointestinal tract are within normal limits. No free fluid or free  air. No pelvic adenopathy. No abnormal mass or fluid collection is  identified. The bony structures reveal hardware identified in the lower  lumbar spine. Degenerative changes identified within the lumbar spine  and pelvis.  Delayed imaging reveals contrast seen in the renal collecting systems  bilaterally. There is contrast seen within both ureters as well as the  bladder. There is no evidence of obstruction.  IMPRESSION:  Decrease seen in the amount of omental thickening anteriorly  within the rightward aspect of the abdomen and pelvis. There is no free  fluid. There is overall improvement in the disease process.        Assessment/Plan  This is a 77 y.o. woman with clinical stage 3C primary peritoneal cancer, currently being treated with a neoadjuvant approach and due for C4D1 of dose dense Carbo/Taxol tomorrow, here for interval treatment planning     1.  Cancer and related symptoms:  She has overall done well with therapy in terms of toxicity.  Her performance status is essentially a 1, although she did need assistance today with redressing following the exam.  I counseled her to be diligent in reporting her symptoms of neuropathy so that these can be adequately assessed during chemotherapy and any dose adjustments be made as needed.  The dizziness and unsteadiness she is describing she reports are better and she is using the walker.  She can try using plain Claritin daily.    Objectively, she has had a response by CA-125, exam, and imaging.  I pulled up her CT scan from today as well as a comparison study to show these to the patient and her family.  They can appreciate that the omental nodularity and disease burden is decreasing.  With her tolerating therapy relatively well, objective  response, but with disease burden still present, coupled with her slight decline in performance status since her initial visit with me, I feel she would best be served by completing the full 6 cycles of therapy prior to consideration for surgery.  They understand that we can repeat imaging following cycle 6 and then decide whether to pursue surgery, versus a treatment holiday.  They understand at some point, especially without surgery, remission will not be long lasting and this cancer will recur and she will likely need additional lines of chemotherapy.    2.  Follow up: RTC following C6 for repeat imaging and treatment planning          Electronically Signed by: Jailene Tran MD  Date: 1/30/2017      Time:  6:48 PM

## 2017-01-31 ENCOUNTER — LAB (OUTPATIENT)
Dept: ONCOLOGY | Facility: CLINIC | Age: 77
End: 2017-01-31

## 2017-01-31 ENCOUNTER — INFUSION (OUTPATIENT)
Dept: ONCOLOGY | Facility: HOSPITAL | Age: 77
End: 2017-01-31

## 2017-01-31 VITALS
BODY MASS INDEX: 26.33 KG/M2 | OXYGEN SATURATION: 93 % | WEIGHT: 153.4 LBS | DIASTOLIC BLOOD PRESSURE: 67 MMHG | RESPIRATION RATE: 20 BRPM | HEART RATE: 79 BPM | TEMPERATURE: 98.8 F | SYSTOLIC BLOOD PRESSURE: 104 MMHG

## 2017-01-31 DIAGNOSIS — C48.2 MALIGNANT NEOPLASM OF PERITONEUM (HCC): Primary | ICD-10-CM

## 2017-01-31 DIAGNOSIS — C48.2 MALIGNANT NEOPLASM OF PERITONEUM (HCC): ICD-10-CM

## 2017-01-31 DIAGNOSIS — C56.9 OVARIAN CANCER, UNSPECIFIED LATERALITY (HCC): ICD-10-CM

## 2017-01-31 LAB
ALBUMIN SERPL-MCNC: 3.9 G/DL (ref 3.4–4.8)
ALBUMIN/GLOB SERPL: 1.8 G/DL (ref 1.5–2.5)
ALP SERPL-CCNC: 80 U/L (ref 46–116)
ALT SERPL W P-5'-P-CCNC: 30 U/L (ref 10–36)
ANION GAP SERPL CALCULATED.3IONS-SCNC: 7 MMOL/L (ref 3.6–11.2)
AST SERPL-CCNC: 25 U/L (ref 10–30)
BASOPHILS # BLD AUTO: 0.01 10*3/MM3 (ref 0–0.3)
BASOPHILS NFR BLD AUTO: 0.3 % (ref 0–2)
BILIRUB SERPL-MCNC: 0.3 MG/DL (ref 0.2–1.8)
BUN BLD-MCNC: 18 MG/DL (ref 7–21)
BUN/CREAT SERPL: 15.9 (ref 7–25)
CALCIUM SPEC-SCNC: 8.2 MG/DL (ref 7.7–10)
CHLORIDE SERPL-SCNC: 110 MMOL/L (ref 99–112)
CO2 SERPL-SCNC: 27 MMOL/L (ref 24.3–31.9)
CREAT BLD-MCNC: 1.13 MG/DL (ref 0.43–1.29)
DEPRECATED RDW RBC AUTO: 63.4 FL (ref 37–54)
EOSINOPHIL # BLD AUTO: 0.01 10*3/MM3 (ref 0–0.7)
EOSINOPHIL NFR BLD AUTO: 0.3 % (ref 0–7)
ERYTHROCYTE [DISTWIDTH] IN BLOOD BY AUTOMATED COUNT: 18.7 % (ref 11.5–14.5)
GFR SERPL CREATININE-BSD FRML MDRD: 46 ML/MIN/1.73
GLOBULIN UR ELPH-MCNC: 2.2 GM/DL
GLUCOSE BLD-MCNC: 97 MG/DL (ref 70–110)
HCT VFR BLD AUTO: 31.8 % (ref 37–47)
HGB BLD-MCNC: 9.9 G/DL (ref 12–16)
IMM GRANULOCYTES # BLD: 0.05 10*3/MM3 (ref 0–0.03)
IMM GRANULOCYTES NFR BLD: 1.3 % (ref 0–0.5)
LYMPHOCYTES # BLD AUTO: 1.02 10*3/MM3 (ref 1–3)
LYMPHOCYTES NFR BLD AUTO: 25.6 % (ref 16–46)
MCH RBC QN AUTO: 30.5 PG (ref 27–33)
MCHC RBC AUTO-ENTMCNC: 31.1 G/DL (ref 33–37)
MCV RBC AUTO: 97.8 FL (ref 80–94)
MONOCYTES # BLD AUTO: 0.32 10*3/MM3 (ref 0.1–0.9)
MONOCYTES NFR BLD AUTO: 8 % (ref 0–12)
NEUTROPHILS # BLD AUTO: 2.58 10*3/MM3 (ref 1.4–6.5)
NEUTROPHILS NFR BLD AUTO: 64.5 % (ref 40–75)
OSMOLALITY SERPL CALC.SUM OF ELEC: 288.7 MOSM/KG (ref 273–305)
PLATELET # BLD AUTO: 156 10*3/MM3 (ref 130–400)
PMV BLD AUTO: 10.5 FL (ref 6–10)
POTASSIUM BLD-SCNC: 4 MMOL/L (ref 3.5–5.3)
PROT SERPL-MCNC: 6.1 G/DL (ref 6–8)
RBC # BLD AUTO: 3.25 10*6/MM3 (ref 4.2–5.4)
SODIUM BLD-SCNC: 144 MMOL/L (ref 135–153)
WBC NRBC COR # BLD: 3.99 10*3/MM3 (ref 4.5–12.5)

## 2017-01-31 PROCEDURE — 96361 HYDRATE IV INFUSION ADD-ON: CPT

## 2017-01-31 PROCEDURE — 96413 CHEMO IV INFUSION 1 HR: CPT

## 2017-01-31 PROCEDURE — 96417 CHEMO IV INFUS EACH ADDL SEQ: CPT

## 2017-01-31 PROCEDURE — 25010000002 PACLITAXEL PER 30 MG: Performed by: INTERNAL MEDICINE

## 2017-01-31 PROCEDURE — 25010000002 PALONOSETRON PER 25 MCG: Performed by: INTERNAL MEDICINE

## 2017-01-31 PROCEDURE — 25010000002 DIPHENHYDRAMINE PER 50 MG: Performed by: INTERNAL MEDICINE

## 2017-01-31 PROCEDURE — 25010000002 CARBOPLATIN PER 50 MG: Performed by: INTERNAL MEDICINE

## 2017-01-31 PROCEDURE — 25010000002 DEXAMETHASONE SODIUM PHOSPHATE 20 MG/5ML SOLUTION 5 ML VIAL: Performed by: INTERNAL MEDICINE

## 2017-01-31 PROCEDURE — 25010000002 HEPARIN FLUSH (PORCINE) 100 UNIT/ML SOLUTION: Performed by: INTERNAL MEDICINE

## 2017-01-31 PROCEDURE — 96375 TX/PRO/DX INJ NEW DRUG ADDON: CPT

## 2017-01-31 RX ORDER — FAMOTIDINE 10 MG/ML
20 INJECTION, SOLUTION INTRAVENOUS ONCE
Status: COMPLETED | OUTPATIENT
Start: 2017-01-31 | End: 2017-01-31

## 2017-01-31 RX ORDER — SODIUM CHLORIDE 0.9 % (FLUSH) 0.9 %
10 SYRINGE (ML) INJECTION AS NEEDED
Status: DISCONTINUED | OUTPATIENT
Start: 2017-01-31 | End: 2017-01-31 | Stop reason: HOSPADM

## 2017-01-31 RX ORDER — PALONOSETRON 0.05 MG/ML
0.25 INJECTION, SOLUTION INTRAVENOUS ONCE
Status: COMPLETED | OUTPATIENT
Start: 2017-01-31 | End: 2017-01-31

## 2017-01-31 RX ORDER — SODIUM CHLORIDE 9 MG/ML
250 INJECTION, SOLUTION INTRAVENOUS ONCE
Status: COMPLETED | OUTPATIENT
Start: 2017-01-31 | End: 2017-01-31

## 2017-01-31 RX ORDER — SODIUM CHLORIDE 0.9 % (FLUSH) 0.9 %
10 SYRINGE (ML) INJECTION AS NEEDED
Status: CANCELLED | OUTPATIENT
Start: 2017-02-06

## 2017-01-31 RX ADMIN — PACLITAXEL 140 MG: 6 INJECTION, SOLUTION INTRAVENOUS at 13:36

## 2017-01-31 RX ADMIN — DIPHENHYDRAMINE HYDROCHLORIDE 25 MG: 50 INJECTION INTRAMUSCULAR; INTRAVENOUS at 12:37

## 2017-01-31 RX ADMIN — FAMOTIDINE 20 MG: 10 INJECTION, SOLUTION INTRAVENOUS at 12:34

## 2017-01-31 RX ADMIN — Medication 10 ML: at 15:19

## 2017-01-31 RX ADMIN — CARBOPLATIN 380 MG: 10 INJECTION, SOLUTION INTRAVENOUS at 14:39

## 2017-01-31 RX ADMIN — DEXAMETHASONE SODIUM PHOSPHATE 12 MG: 4 INJECTION, SOLUTION INTRAMUSCULAR; INTRAVENOUS at 12:54

## 2017-01-31 RX ADMIN — HEPARIN SODIUM (PORCINE) LOCK FLUSH IV SOLN 100 UNIT/ML 500 UNITS: 100 SOLUTION at 15:20

## 2017-01-31 RX ADMIN — PALONOSETRON HYDROCHLORIDE 0.25 MG: 0.25 INJECTION INTRAVENOUS at 12:32

## 2017-01-31 RX ADMIN — SODIUM CHLORIDE 250 ML: 9 INJECTION, SOLUTION INTRAVENOUS at 12:32

## 2017-02-01 LAB — CANCER AG125 SERPL-ACNC: 142.1 U/ML (ref 0–38.1)

## 2017-02-02 ENCOUNTER — LAB (OUTPATIENT)
Dept: ONCOLOGY | Facility: CLINIC | Age: 77
End: 2017-02-02

## 2017-02-02 DIAGNOSIS — D49.0: Primary | ICD-10-CM

## 2017-02-02 DIAGNOSIS — C56.9 OVARIAN CANCER, UNSPECIFIED LATERALITY (HCC): ICD-10-CM

## 2017-02-02 LAB
ALBUMIN SERPL-MCNC: 4.2 G/DL (ref 3.4–4.8)
ALBUMIN/GLOB SERPL: 1.8 G/DL (ref 1.5–2.5)
ALP SERPL-CCNC: 84 U/L (ref 46–116)
ALT SERPL W P-5'-P-CCNC: 34 U/L (ref 10–36)
ANION GAP SERPL CALCULATED.3IONS-SCNC: 6 MMOL/L (ref 3.6–11.2)
AST SERPL-CCNC: 29 U/L (ref 10–30)
BASOPHILS # BLD AUTO: 0.01 10*3/MM3 (ref 0–0.3)
BASOPHILS NFR BLD AUTO: 0.3 % (ref 0–2)
BILIRUB SERPL-MCNC: 0.5 MG/DL (ref 0.2–1.8)
BUN BLD-MCNC: 18 MG/DL (ref 7–21)
BUN/CREAT SERPL: 16.5 (ref 7–25)
CALCIUM SPEC-SCNC: 8.3 MG/DL (ref 7.7–10)
CHLORIDE SERPL-SCNC: 108 MMOL/L (ref 99–112)
CO2 SERPL-SCNC: 30 MMOL/L (ref 24.3–31.9)
CREAT BLD-MCNC: 1.09 MG/DL (ref 0.43–1.29)
DEPRECATED RDW RBC AUTO: 66.3 FL (ref 37–54)
EOSINOPHIL # BLD AUTO: 0.01 10*3/MM3 (ref 0–0.7)
EOSINOPHIL NFR BLD AUTO: 0.3 % (ref 0–7)
ERYTHROCYTE [DISTWIDTH] IN BLOOD BY AUTOMATED COUNT: 19.4 % (ref 11.5–14.5)
GFR SERPL CREATININE-BSD FRML MDRD: 48 ML/MIN/1.73
GLOBULIN UR ELPH-MCNC: 2.4 GM/DL
GLUCOSE BLD-MCNC: 91 MG/DL (ref 70–110)
HCT VFR BLD AUTO: 33.6 % (ref 37–47)
HGB BLD-MCNC: 11.2 G/DL (ref 12–16)
IMM GRANULOCYTES # BLD: 0.02 10*3/MM3 (ref 0–0.03)
IMM GRANULOCYTES NFR BLD: 0.5 % (ref 0–0.5)
LYMPHOCYTES # BLD AUTO: 0.91 10*3/MM3 (ref 1–3)
LYMPHOCYTES NFR BLD AUTO: 23 % (ref 16–46)
MCH RBC QN AUTO: 32.1 PG (ref 27–33)
MCHC RBC AUTO-ENTMCNC: 33.3 G/DL (ref 33–37)
MCV RBC AUTO: 96.3 FL (ref 80–94)
MONOCYTES # BLD AUTO: 0.29 10*3/MM3 (ref 0.1–0.9)
MONOCYTES NFR BLD AUTO: 7.3 % (ref 0–12)
NEUTROPHILS # BLD AUTO: 2.71 10*3/MM3 (ref 1.4–6.5)
NEUTROPHILS NFR BLD AUTO: 68.6 % (ref 40–75)
OSMOLALITY SERPL CALC.SUM OF ELEC: 288.3 MOSM/KG (ref 273–305)
PLATELET # BLD AUTO: 149 10*3/MM3 (ref 130–400)
PMV BLD AUTO: 11.1 FL (ref 6–10)
POTASSIUM BLD-SCNC: 4 MMOL/L (ref 3.5–5.3)
PROT SERPL-MCNC: 6.6 G/DL (ref 6–8)
RBC # BLD AUTO: 3.49 10*6/MM3 (ref 4.2–5.4)
SODIUM BLD-SCNC: 144 MMOL/L (ref 135–153)
WBC NRBC COR # BLD: 3.95 10*3/MM3 (ref 4.5–12.5)

## 2017-02-02 PROCEDURE — 85025 COMPLETE CBC W/AUTO DIFF WBC: CPT | Performed by: INTERNAL MEDICINE

## 2017-02-02 PROCEDURE — 36415 COLL VENOUS BLD VENIPUNCTURE: CPT

## 2017-02-02 PROCEDURE — 80053 COMPREHEN METABOLIC PANEL: CPT | Performed by: INTERNAL MEDICINE

## 2017-02-06 ENCOUNTER — INFUSION (OUTPATIENT)
Dept: ONCOLOGY | Facility: HOSPITAL | Age: 77
End: 2017-02-06

## 2017-02-06 ENCOUNTER — OFFICE VISIT (OUTPATIENT)
Dept: ONCOLOGY | Facility: CLINIC | Age: 77
End: 2017-02-06

## 2017-02-06 VITALS
WEIGHT: 150.5 LBS | DIASTOLIC BLOOD PRESSURE: 61 MMHG | OXYGEN SATURATION: 93 % | RESPIRATION RATE: 18 BRPM | TEMPERATURE: 98.5 F | BODY MASS INDEX: 25.83 KG/M2 | HEART RATE: 88 BPM | SYSTOLIC BLOOD PRESSURE: 93 MMHG

## 2017-02-06 VITALS
OXYGEN SATURATION: 93 % | HEART RATE: 88 BPM | RESPIRATION RATE: 18 BRPM | SYSTOLIC BLOOD PRESSURE: 93 MMHG | DIASTOLIC BLOOD PRESSURE: 61 MMHG | TEMPERATURE: 98.5 F | WEIGHT: 150.5 LBS | BODY MASS INDEX: 25.83 KG/M2

## 2017-02-06 DIAGNOSIS — C48.2 MALIGNANT NEOPLASM OF PERITONEUM (HCC): ICD-10-CM

## 2017-02-06 DIAGNOSIS — D49.0: ICD-10-CM

## 2017-02-06 DIAGNOSIS — C48.2 MALIGNANT NEOPLASM OF PERITONEUM (HCC): Primary | ICD-10-CM

## 2017-02-06 DIAGNOSIS — C56.9 OVARIAN CANCER, UNSPECIFIED LATERALITY (HCC): Primary | ICD-10-CM

## 2017-02-06 LAB
ALBUMIN SERPL-MCNC: 4 G/DL (ref 3.4–4.8)
ALBUMIN/GLOB SERPL: 1.7 G/DL (ref 1.5–2.5)
ALP SERPL-CCNC: 81 U/L (ref 46–116)
ALT SERPL W P-5'-P-CCNC: 29 U/L (ref 10–36)
ANION GAP SERPL CALCULATED.3IONS-SCNC: 5.3 MMOL/L (ref 3.6–11.2)
AST SERPL-CCNC: 24 U/L (ref 10–30)
BASOPHILS # BLD AUTO: 0.02 10*3/MM3 (ref 0–0.3)
BASOPHILS NFR BLD AUTO: 0.5 % (ref 0–2)
BILIRUB SERPL-MCNC: 0.3 MG/DL (ref 0.2–1.8)
BUN BLD-MCNC: 28 MG/DL (ref 7–21)
BUN/CREAT SERPL: 22.6 (ref 7–25)
CALCIUM SPEC-SCNC: 8.4 MG/DL (ref 7.7–10)
CHLORIDE SERPL-SCNC: 110 MMOL/L (ref 99–112)
CO2 SERPL-SCNC: 26.7 MMOL/L (ref 24.3–31.9)
CREAT BLD-MCNC: 1.24 MG/DL (ref 0.43–1.29)
DEPRECATED RDW RBC AUTO: 63.3 FL (ref 37–54)
EOSINOPHIL # BLD AUTO: 0 10*3/MM3 (ref 0–0.7)
EOSINOPHIL NFR BLD AUTO: 0 % (ref 0–7)
ERYTHROCYTE [DISTWIDTH] IN BLOOD BY AUTOMATED COUNT: 18.7 % (ref 11.5–14.5)
GFR SERPL CREATININE-BSD FRML MDRD: 42 ML/MIN/1.73
GLOBULIN UR ELPH-MCNC: 2.3 GM/DL
GLUCOSE BLD-MCNC: 85 MG/DL (ref 70–110)
HCT VFR BLD AUTO: 32.2 % (ref 37–47)
HGB BLD-MCNC: 10.2 G/DL (ref 12–16)
IMM GRANULOCYTES # BLD: 0.02 10*3/MM3 (ref 0–0.03)
IMM GRANULOCYTES NFR BLD: 0.5 % (ref 0–0.5)
LYMPHOCYTES # BLD AUTO: 1.04 10*3/MM3 (ref 1–3)
LYMPHOCYTES NFR BLD AUTO: 26.3 % (ref 16–46)
MCH RBC QN AUTO: 31.1 PG (ref 27–33)
MCHC RBC AUTO-ENTMCNC: 31.7 G/DL (ref 33–37)
MCV RBC AUTO: 98.2 FL (ref 80–94)
MONOCYTES # BLD AUTO: 0.27 10*3/MM3 (ref 0.1–0.9)
MONOCYTES NFR BLD AUTO: 6.8 % (ref 0–12)
NEUTROPHILS # BLD AUTO: 2.61 10*3/MM3 (ref 1.4–6.5)
NEUTROPHILS NFR BLD AUTO: 65.9 % (ref 40–75)
OSMOLALITY SERPL CALC.SUM OF ELEC: 287.8 MOSM/KG (ref 273–305)
PLATELET # BLD AUTO: 123 10*3/MM3 (ref 130–400)
PMV BLD AUTO: 10.6 FL (ref 6–10)
POTASSIUM BLD-SCNC: 4.8 MMOL/L (ref 3.5–5.3)
PROT SERPL-MCNC: 6.3 G/DL (ref 6–8)
RBC # BLD AUTO: 3.28 10*6/MM3 (ref 4.2–5.4)
SODIUM BLD-SCNC: 142 MMOL/L (ref 135–153)
WBC NRBC COR # BLD: 3.96 10*3/MM3 (ref 4.5–12.5)

## 2017-02-06 PROCEDURE — 99214 OFFICE O/P EST MOD 30 MIN: CPT | Performed by: INTERNAL MEDICINE

## 2017-02-06 PROCEDURE — 96361 HYDRATE IV INFUSION ADD-ON: CPT

## 2017-02-06 PROCEDURE — 96375 TX/PRO/DX INJ NEW DRUG ADDON: CPT

## 2017-02-06 PROCEDURE — 25010000002 DIPHENHYDRAMINE PER 50 MG: Performed by: INTERNAL MEDICINE

## 2017-02-06 PROCEDURE — 96413 CHEMO IV INFUSION 1 HR: CPT

## 2017-02-06 PROCEDURE — 25010000002 HEPARIN FLUSH (PORCINE) 100 UNIT/ML SOLUTION: Performed by: INTERNAL MEDICINE

## 2017-02-06 PROCEDURE — 25010000002 PACLITAXEL PER 30 MG: Performed by: INTERNAL MEDICINE

## 2017-02-06 PROCEDURE — 25010000002 DEXAMETHASONE SODIUM PHOSPHATE 20 MG/5ML SOLUTION 5 ML VIAL: Performed by: INTERNAL MEDICINE

## 2017-02-06 RX ORDER — FAMOTIDINE 10 MG/ML
20 INJECTION, SOLUTION INTRAVENOUS ONCE
Status: COMPLETED | OUTPATIENT
Start: 2017-02-06 | End: 2017-02-06

## 2017-02-06 RX ORDER — SODIUM CHLORIDE 0.9 % (FLUSH) 0.9 %
10 SYRINGE (ML) INJECTION AS NEEDED
Status: DISCONTINUED | OUTPATIENT
Start: 2017-02-06 | End: 2017-02-06 | Stop reason: HOSPADM

## 2017-02-06 RX ORDER — SODIUM CHLORIDE 9 MG/ML
250 INJECTION, SOLUTION INTRAVENOUS ONCE
Status: COMPLETED | OUTPATIENT
Start: 2017-02-06 | End: 2017-02-06

## 2017-02-06 RX ORDER — SODIUM CHLORIDE 0.9 % (FLUSH) 0.9 %
10 SYRINGE (ML) INJECTION AS NEEDED
Status: CANCELLED | OUTPATIENT
Start: 2017-02-13

## 2017-02-06 RX ADMIN — SODIUM CHLORIDE 250 ML: 9 INJECTION, SOLUTION INTRAVENOUS at 10:58

## 2017-02-06 RX ADMIN — HEPARIN SODIUM (PORCINE) LOCK FLUSH IV SOLN 100 UNIT/ML 500 UNITS: 100 SOLUTION at 13:11

## 2017-02-06 RX ADMIN — DIPHENHYDRAMINE HYDROCHLORIDE 25 MG: 50 INJECTION INTRAMUSCULAR; INTRAVENOUS at 11:17

## 2017-02-06 RX ADMIN — DEXAMETHASONE SODIUM PHOSPHATE 12 MG: 4 INJECTION, SOLUTION INTRAMUSCULAR; INTRAVENOUS at 11:00

## 2017-02-06 RX ADMIN — PACLITAXEL 140 MG: 6 INJECTION, SOLUTION INTRAVENOUS at 11:51

## 2017-02-06 RX ADMIN — FAMOTIDINE 20 MG: 10 INJECTION, SOLUTION INTRAVENOUS at 10:58

## 2017-02-06 RX ADMIN — Medication 10 ML: at 13:10

## 2017-02-06 NOTE — PROGRESS NOTES
Name:  Aminta Yoo  :  1940  Date:  2017     REFERRING PHYSICIAN  Win Us MD    PRIMARY CARE PROVIDER   Parisa Daniels, RICARDO    REASON FOR FOLLOWUP  1. Malignant neoplasm of peritoneum      CHIEF COMPLAINT  Occasional abdominal pain, still manageable with prn Norco. Controlled leg pains on neurontin. Intermittent dizziness.    Dear MsElo CoyNisreenPascual,    HISTORY OF PRESENT ILLNESS:   I saw Ms. Yoo in follow up today in our medical oncology clinic. As you are aware, she is a pleasant, 77 y.o., white female with a history of a (presumably benign) brain tumor (status post resection in ~) who was in her usual state of health until late summer 2016 when she started to feel increasingly full/swollen and began to experience occasional abdominal pains/discomfort. An abdominal CT scan in late 2016 identified numerous deposits of probable tumor in the mesentery consistent with carcinomatosis, and she was referred to local surgery. Endoscopies were performed that were unremarkable and, between this and a  level of > 3000, it was felt that a gynecologic malignancy (likely ovarian or primary peritoneal) was the probable underlying diagnosis. A CT-guided biopsy of some abdominal wall soft tissue in late 2016 confirmed this. She was subsequently evaluated by gynecologic oncology in [place] (who may eventually attempt a debulking laparotomy; but, initial therapy with [id number] carbo/taxol was recommended first). The patient was agreeable and began this treatment on 2016.    INTERIM HISTORY:  Ms. Yoo returns to clinic today for followup again accompanied by her son. She began chemotherapy in mid-2016 and has overall been tolerating it fairly well. She has been getting a little more progressively fatigued; and her leg aches and restlessness are still an issue (although they remain tolerable on neurontin). Her main complaint today is still of  intermittent dizziness.    Past Medical History   Diagnosis Date   • Arthritis    • Cancer      brain tumor   • Chronic constipation    • Chronic narcotic use      Arthritis   • Chronic renal insufficiency    • Depression    • GERD (gastroesophageal reflux disease)    • Ovarian cancer        Past Surgical History   Procedure Laterality Date   • Brain tumor excision  2000     Cancerous. Treated with radiation after surgery.    • Back surgery  2012   • Colonoscopy N/A 9/30/2016     Procedure: COLONOSCOPY;  Surgeon: Win Us MD;  Location: [facility];  Service:    • Endoscopy N/A 9/30/2016     Procedure: ESOPHAGOGASTRODUODENOSCOPY;  Surgeon: Win Us MD;  Location: [facility];  Service:    • Total abdominal hysterectomy  1977     Benign disease.  One ovary remains in situ.   • Portacath placement N/A 11/4/2016     Procedure: INSERTION OF PORTACATH;  Surgeon: Win Us MD;  Location: [facility];  Service:        Social History     Social History   • Marital status:      Spouse name: N/A   • Number of children: N/A   • Years of education: N/A     Occupational History   • Not on file.     Social History Main Topics   • Smoking status: Former Smoker     Packs/day: 1.00     Years: 20.00     Quit date: 9/29/1996   • Smokeless tobacco: Never Used   • Alcohol use No   • Drug use: No   • Sexual activity: Defer     Other Topics Concern   • Not on file     Social History Narrative       Family History   Problem Relation Age of Onset   • Heart disease Mother    • Hypertension Mother    • Cancer Sister      Brain & Breast       Allergies   Allergen Reactions   • Sulfa Antibiotics Rash       Current Outpatient Prescriptions   Medication Sig Dispense Refill   • calcium acetate (PHOSLO) 667 MG capsule Take 667 mg by mouth 3 (Three) Times a Day With Meals.     • diclofenac (VOLTAREN) 75 MG EC tablet 2 (Two) Times a Day.     • FLUoxetine (PROzac) 10 MG capsule 10 mg Every Night.  0   • FLUoxetine (PROzac) 20 MG  capsule Take 20 mg by mouth Daily.     • gabapentin (NEURONTIN) 300 MG capsule Take 1 capsule by mouth 3 (Three) Times a Day. 90 capsule 5   • HYDROcodone-acetaminophen (NORCO) 7.5-325 MG per tablet Every 6 (Six) Hours As Needed.     • NEXIUM 40 MG capsule Every Morning Before Breakfast.     • ondansetron (ZOFRAN) 8 MG tablet Take 1 tablet by mouth Every 8 (Eight) Hours As Needed for nausea or vomiting. 30 tablet 5   • prochlorperazine (COMPAZINE) 10 MG tablet Take 1 tablet by mouth Every 6 (Six) Hours As Needed for nausea or vomiting. 30 tablet 5   • spironolactone (ALDACTONE) 25 MG tablet 25 mg 2 (Two) Times a Day.       No current facility-administered medications for this visit.      Facility-Administered Medications Ordered in Other Visits   Medication Dose Route Frequency Provider Last Rate Last Dose   • heparin flush (porcine) 100 UNIT/ML injection  - ADS Override Pull                REVIEW OF SYSTEMS  CONSTITUTIONAL:  No fever, chills or night sweats. Progressive fatigue since starting chemotherapy, as per the HPI above.  EYES:  No blurry vision, diplopia or other vision changes.  ENT:  No hearing loss, nosebleeds or sore throat.  CARDIOVASCULAR:  No palpitations, arrhythmia, syncopal episodes or edema.  PULMONARY:  No hemoptysis, wheezing, chronic cough or shortness of breath.  GASTROINTESTINAL:  No nausea or vomiting.  Intermittent abdominal pains and intermittent constipation.  GENITOURINARY:  No hematuria, kidney stones or frequent urination.  MUSCULOSKELETAL:  Bilateral leg pains, still manageable with neurontin, as per the HPI above.  INTEGUMENTARY: No rashes or pruritus.  ENDOCRINE:  No excessive thirst or hot flashes.  HEMATOLOGIC:  No history of free bleeding, spontaneous bleeding or clotting.  IMMUNOLOGIC:  No allergies or frequent infections.  NEUROLOGIC: No numbness, tingling, seizures or weakness. Dizziness, as per the HPI above.  PSYCHIATRIC:  No anxiety or depression.    PHYSICAL  EXAMINATION    Visit Vitals   • BP 93/61   • Pulse 88   • Temp 98.5 °F (36.9 °C) (Oral)   • Resp 18   • Wt 150 lb 8 oz (68.3 kg)   • SpO2 93%   • BMI 25.83 kg/m2       GENERAL:  A well-developed, well-nourished, elderly, white female in no acute distress.  HEENT:  Pupils equally round and reactive to light.  Extraocular muscles intact. Positive alopecia, wearing a wig.  CARDIOVASCULAR:  Regular rate and rhythm.  No murmurs, gallops or rubs.  LUNGS:  Clear to auscultation bilaterally.  ABDOMEN:  Soft, nondistended with positive bowel sounds.  EXTREMITIES:  No clubbing, cyanosis or edema bilaterally.  SKIN:  No rashes or petechiae. Powerport in place.  NEURO:  Cranial nerves grossly intact.  No focal deficits.  PSYCH:  Alert and oriented x3.    LABORATORY    Lab Results   Component Value Date    WBC 3.96 (L) 02/06/2017    HGB 10.2 (L) 02/06/2017    HCT 32.2 (L) 02/06/2017    MCV 98.2 (H) 02/06/2017     (L) 02/06/2017    NEUTROABS 2.61 02/06/2017       Lab Results   Component Value Date     02/06/2017    K 4.8 02/06/2017     02/06/2017    CO2 26.7 02/06/2017    BUN 28 (H) 02/06/2017    CREATININE 1.24 02/06/2017    GLUCOSE 85 02/06/2017    CALCIUM 8.4 02/06/2017    AST 24 02/06/2017    ALT 29 02/06/2017    ALKPHOS 81 02/06/2017    BILITOT 0.3 02/06/2017    PROTEINTOT 6.3 02/06/2017    ALBUMIN 4.00 02/06/2017      (01/31/2017): 142.1 U/mL   (12/27/2016): 370.3 U/mL   (11/18/2016): 2625.0 U/mL   (09/29/2016): 3277.0 U/mL    IMAGING  CT abdomen and pelvis with contrast (09/22/2016):  Impression: Findings most consistent with carcinomatosis of the mesentery with numerous deposits of probable tumor throughout the lower abdomen and pelvis. Pathologic sized adenopathy is also seen.    NM PET with fusion CT, skull base to mid-thigh (10/28/2016):  Impression: Abnormal PET fusion CT showing fairly intense hypermetabolic activity throughout the abnormal soft tissue densities in the  mesentery. This was the site that was previously biopsied. The SUV value was 7.6 (or 17.6?).    CT abdomen and pelvis with contrast (01/30/2017):  Impression: Decrease seen in the amount of omental thickening anteriorly within the rightward aspect of the abdomen and pelvis. There is no free fluid. There is overall improvement in the disease process.    PATHOLOGY  Soft tissue, abdominal wall, needle core biopsies (10/25/2016):  Poorly differentiated carcinoma. The findings are supportive of the clinical impression of ovarian/primary peritoneal carcinoma.    IMPRESSION AND PLAN  Ms. Yoo is a 77 y.o., white female with:  1. Metastatic ovarian/primary peritoneal cancer: Initially felt to be the most likely diagnosis, given the patient's clinical presentation, imaging appearance, negative GI evaluation and extremely elevated  level (3277 U/mL on 09/29/2016); and, in late October 2016, confirmed via a CT-guided biopsy of some abdominal wall soft tissue. I have had multiple long discussions with the patient and her family regarding this diagnosis and its prognosis. They are aware that this disease is not likely to be curable; but, given her good performance status, it was (and remains) treatable. A NM PET scan performed on 10/28/2016 showed no evidence of disease outside the peritoneum (although it did further suggest extensive disease within it). Once a powerport was placed, we began [id number] carboplatin/taxol (AUC 6 on day 1; 80 mg/m2 days 1,8,15) on 11/18/2016. She has received a little over three cycles of this regimen to date and is overall still tolerating it fairly well; however, with her steadily increasing fatigue and issue #2 (see below), we gave her an additional week off from therapy last month. Fortunately, despite her side effects, the most recent repeat  level (~142 U/mL on 01/31/2017) remains substantially decreased from priors (~3000+ U/mL in late September 2016), an encouraging sign that  her treatment is providing her with an ongoing, measurable benefit. We will proceed with this current treatment plan (day #8 of cycle #4 today). We will see her back in clinic in ~ one month (on day #1 or #8 of the sixth cycle). Following the completion of the sixth cycle, she will follow up again with [facility] gyn/onc with a repeat scan to consider an exploratory/debulking laparotomy.  2. Dizziness: Possibly related to the extra dose of neurontin the patient had been accidentally taking recently (she was taking an old, longstanding Rx for neurontin 600 mg daily along with the new Rx for neurontin 300 mg TID we provided several weeks ago). She was instructed to discontinue the daily, 600 mg dose and continue 300 mg TID only. Continue to monitor.  3. 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 Continue neurontin 300 mg TID. Continue to monitor.  4. Abdominal pain: Secondary to issue #1. Improved with ongoing, palliative chemotherapy. Continue prn Norco. Continue to monitor.  5. Constipation: Secondary to issue #1 and prn narcotics. Currently still improved with the use of stool softeners and prn laxatives. Continue to monitor.  The patient and her son were in agreement with these plans.    It is a pleasure to participate in Ms. Yoo's care.  Please do not hesitate to call with any questions or concerns that you may have.    A total of 30 minutes were spent coordinating this patient’s care in clinic today; 25 minutes of which were face-to-face with the patient and her son, reviewing her interim medical history, discussing the results of the recent repeat CT scan and labs and counseling on the current treatment and followup plan.  All questions were answered to their satisfaction.    FOLLOW UP  Proceed with day #8 of cycle #4 of [id number] carboplatin/taxol (AUC 6 on day #1/ 80 mg/m2 on days #1,8,15) today as planned. Return to our clinic in ~4-5 weeks (on day #1 or 8 of the 6th cycle) with a CBC,  CMP and repeat  level. With [facility] gyn/onc in late March/early April 2017, shortly after the completion of the 6th cycle, with a repeat CT scan.        This document was electronically signed by ADE Warner MD February 6, 2017 10:37 AM      CC: RICARDO Patel MD

## 2017-02-07 ENCOUNTER — TELEPHONE (OUTPATIENT)
Dept: ONCOLOGY | Facility: HOSPITAL | Age: 77
End: 2017-02-07

## 2017-02-07 NOTE — TELEPHONE ENCOUNTER
Phone call placed to pt following yesterday's chemo tx.  Pt states that she had one episode of diarrhea last night, but otherwise she is not having any other SE or difficulties from treatment.  Encouraged pt to call with any questions or concerns prior to next tx as needed.  Pt verbalized understanding.

## 2017-02-13 ENCOUNTER — INFUSION (OUTPATIENT)
Dept: ONCOLOGY | Facility: HOSPITAL | Age: 77
End: 2017-02-13

## 2017-02-13 VITALS
WEIGHT: 147.5 LBS | HEART RATE: 102 BPM | DIASTOLIC BLOOD PRESSURE: 74 MMHG | SYSTOLIC BLOOD PRESSURE: 114 MMHG | TEMPERATURE: 98.1 F | BODY MASS INDEX: 25.32 KG/M2 | RESPIRATION RATE: 18 BRPM | OXYGEN SATURATION: 95 %

## 2017-02-13 DIAGNOSIS — C56.9 OVARIAN CANCER, UNSPECIFIED LATERALITY (HCC): Primary | ICD-10-CM

## 2017-02-13 DIAGNOSIS — C48.2 MALIGNANT NEOPLASM OF PERITONEUM (HCC): ICD-10-CM

## 2017-02-13 DIAGNOSIS — D49.0: ICD-10-CM

## 2017-02-13 LAB
ALBUMIN SERPL-MCNC: 4.1 G/DL (ref 3.4–4.8)
ALBUMIN/GLOB SERPL: 1.7 G/DL (ref 1.5–2.5)
ALP SERPL-CCNC: 77 U/L (ref 46–116)
ALT SERPL W P-5'-P-CCNC: 32 U/L (ref 10–36)
ANION GAP SERPL CALCULATED.3IONS-SCNC: 7.1 MMOL/L (ref 3.6–11.2)
AST SERPL-CCNC: 27 U/L (ref 10–30)
BASOPHILS # BLD AUTO: 0.01 10*3/MM3 (ref 0–0.3)
BASOPHILS NFR BLD AUTO: 0.2 % (ref 0–2)
BILIRUB SERPL-MCNC: 0.3 MG/DL (ref 0.2–1.8)
BUN BLD-MCNC: 26 MG/DL (ref 7–21)
BUN/CREAT SERPL: 17 (ref 7–25)
CALCIUM SPEC-SCNC: 8.2 MG/DL (ref 7.7–10)
CHLORIDE SERPL-SCNC: 107 MMOL/L (ref 99–112)
CO2 SERPL-SCNC: 25.9 MMOL/L (ref 24.3–31.9)
CREAT BLD-MCNC: 1.53 MG/DL (ref 0.43–1.29)
DEPRECATED RDW RBC AUTO: 62.7 FL (ref 37–54)
EOSINOPHIL # BLD AUTO: 0.04 10*3/MM3 (ref 0–0.7)
EOSINOPHIL NFR BLD AUTO: 1 % (ref 0–7)
ERYTHROCYTE [DISTWIDTH] IN BLOOD BY AUTOMATED COUNT: 18.8 % (ref 11.5–14.5)
GFR SERPL CREATININE-BSD FRML MDRD: 33 ML/MIN/1.73
GLOBULIN UR ELPH-MCNC: 2.4 GM/DL
GLUCOSE BLD-MCNC: 90 MG/DL (ref 70–110)
HCT VFR BLD AUTO: 31.2 % (ref 37–47)
HGB BLD-MCNC: 10.1 G/DL (ref 12–16)
IMM GRANULOCYTES # BLD: 0.04 10*3/MM3 (ref 0–0.03)
IMM GRANULOCYTES NFR BLD: 1 % (ref 0–0.5)
LYMPHOCYTES # BLD AUTO: 1.34 10*3/MM3 (ref 1–3)
LYMPHOCYTES NFR BLD AUTO: 32.1 % (ref 16–46)
MCH RBC QN AUTO: 31.9 PG (ref 27–33)
MCHC RBC AUTO-ENTMCNC: 32.4 G/DL (ref 33–37)
MCV RBC AUTO: 98.4 FL (ref 80–94)
MONOCYTES # BLD AUTO: 0.45 10*3/MM3 (ref 0.1–0.9)
MONOCYTES NFR BLD AUTO: 10.8 % (ref 0–12)
NEUTROPHILS # BLD AUTO: 2.3 10*3/MM3 (ref 1.4–6.5)
NEUTROPHILS NFR BLD AUTO: 54.9 % (ref 40–75)
OSMOLALITY SERPL CALC.SUM OF ELEC: 283.7 MOSM/KG (ref 273–305)
PLATELET # BLD AUTO: 138 10*3/MM3 (ref 130–400)
PMV BLD AUTO: 11.1 FL (ref 6–10)
POTASSIUM BLD-SCNC: 4.5 MMOL/L (ref 3.5–5.3)
PROT SERPL-MCNC: 6.5 G/DL (ref 6–8)
RBC # BLD AUTO: 3.17 10*6/MM3 (ref 4.2–5.4)
SODIUM BLD-SCNC: 140 MMOL/L (ref 135–153)
WBC NRBC COR # BLD: 4.18 10*3/MM3 (ref 4.5–12.5)

## 2017-02-13 PROCEDURE — 96375 TX/PRO/DX INJ NEW DRUG ADDON: CPT | Performed by: NURSE PRACTITIONER

## 2017-02-13 PROCEDURE — 25010000002 DIPHENHYDRAMINE PER 50 MG: Performed by: INTERNAL MEDICINE

## 2017-02-13 PROCEDURE — 96413 CHEMO IV INFUSION 1 HR: CPT | Performed by: NURSE PRACTITIONER

## 2017-02-13 PROCEDURE — 25010000002 HEPARIN FLUSH (PORCINE) 100 UNIT/ML SOLUTION: Performed by: INTERNAL MEDICINE

## 2017-02-13 PROCEDURE — 25010000002 PACLITAXEL PER 30 MG: Performed by: INTERNAL MEDICINE

## 2017-02-13 PROCEDURE — 25010000002 DEXAMETHASONE SODIUM PHOSPHATE 20 MG/5ML SOLUTION 5 ML VIAL: Performed by: INTERNAL MEDICINE

## 2017-02-13 RX ORDER — FAMOTIDINE 10 MG/ML
20 INJECTION, SOLUTION INTRAVENOUS ONCE
Status: COMPLETED | OUTPATIENT
Start: 2017-02-13 | End: 2017-02-13

## 2017-02-13 RX ORDER — SODIUM CHLORIDE 9 MG/ML
1000 INJECTION, SOLUTION INTRAVENOUS ONCE
Status: COMPLETED | OUTPATIENT
Start: 2017-02-13 | End: 2017-02-13

## 2017-02-13 RX ORDER — SODIUM CHLORIDE 0.9 % (FLUSH) 0.9 %
10 SYRINGE (ML) INJECTION AS NEEDED
Status: CANCELLED | OUTPATIENT
Start: 2017-02-20

## 2017-02-13 RX ORDER — SODIUM CHLORIDE 0.9 % (FLUSH) 0.9 %
10 SYRINGE (ML) INJECTION AS NEEDED
Status: DISCONTINUED | OUTPATIENT
Start: 2017-02-13 | End: 2017-02-13 | Stop reason: HOSPADM

## 2017-02-13 RX ADMIN — SODIUM CHLORIDE 1000 ML: 9 INJECTION, SOLUTION INTRAVENOUS at 11:15

## 2017-02-13 RX ADMIN — DIPHENHYDRAMINE HYDROCHLORIDE 25 MG: 50 INJECTION INTRAMUSCULAR; INTRAVENOUS at 11:30

## 2017-02-13 RX ADMIN — HEPARIN SODIUM (PORCINE) LOCK FLUSH IV SOLN 100 UNIT/ML 500 UNITS: 100 SOLUTION at 13:20

## 2017-02-13 RX ADMIN — FAMOTIDINE 20 MG: 10 INJECTION, SOLUTION INTRAVENOUS at 11:45

## 2017-02-13 RX ADMIN — Medication 10 ML: at 13:20

## 2017-02-13 RX ADMIN — DEXAMETHASONE SODIUM PHOSPHATE 12 MG: 4 INJECTION, SOLUTION INTRAMUSCULAR; INTRAVENOUS at 11:15

## 2017-02-13 RX ADMIN — PACLITAXEL 140 MG: 6 INJECTION, SOLUTION INTRAVENOUS at 11:50

## 2017-02-14 LAB — CANCER AG125 SERPL-ACNC: 98.3 U/ML (ref 0–38.1)

## 2017-02-16 DIAGNOSIS — C48.2 MALIGNANT NEOPLASM OF PERITONEUM (HCC): ICD-10-CM

## 2017-02-16 RX ORDER — FAMOTIDINE 10 MG/ML
20 INJECTION, SOLUTION INTRAVENOUS ONCE
Status: CANCELLED | OUTPATIENT
Start: 2017-03-06

## 2017-02-16 RX ORDER — FAMOTIDINE 10 MG/ML
20 INJECTION, SOLUTION INTRAVENOUS ONCE
Status: CANCELLED | OUTPATIENT
Start: 2017-02-27

## 2017-02-16 RX ORDER — PALONOSETRON 0.05 MG/ML
0.25 INJECTION, SOLUTION INTRAVENOUS ONCE
Status: CANCELLED | OUTPATIENT
Start: 2017-02-20

## 2017-02-16 RX ORDER — SODIUM CHLORIDE 9 MG/ML
250 INJECTION, SOLUTION INTRAVENOUS ONCE
Status: CANCELLED | OUTPATIENT
Start: 2017-02-20

## 2017-02-16 RX ORDER — SODIUM CHLORIDE 9 MG/ML
250 INJECTION, SOLUTION INTRAVENOUS ONCE
Status: CANCELLED | OUTPATIENT
Start: 2017-02-27

## 2017-02-16 RX ORDER — SODIUM CHLORIDE 9 MG/ML
250 INJECTION, SOLUTION INTRAVENOUS ONCE
Status: CANCELLED | OUTPATIENT
Start: 2017-03-06

## 2017-02-16 RX ORDER — FAMOTIDINE 10 MG/ML
20 INJECTION, SOLUTION INTRAVENOUS ONCE
Status: CANCELLED | OUTPATIENT
Start: 2017-02-20

## 2017-02-20 ENCOUNTER — INFUSION (OUTPATIENT)
Dept: ONCOLOGY | Facility: HOSPITAL | Age: 77
End: 2017-02-20

## 2017-02-20 VITALS
RESPIRATION RATE: 18 BRPM | BODY MASS INDEX: 25.64 KG/M2 | OXYGEN SATURATION: 96 % | WEIGHT: 149.4 LBS | SYSTOLIC BLOOD PRESSURE: 113 MMHG | DIASTOLIC BLOOD PRESSURE: 77 MMHG | HEART RATE: 103 BPM | TEMPERATURE: 97 F

## 2017-02-20 DIAGNOSIS — C48.2 MALIGNANT NEOPLASM OF PERITONEUM (HCC): Primary | ICD-10-CM

## 2017-02-20 LAB
ALBUMIN SERPL-MCNC: 4.1 G/DL (ref 3.4–4.8)
ALBUMIN/GLOB SERPL: 1.6 G/DL (ref 1.5–2.5)
ALP SERPL-CCNC: 80 U/L (ref 35–104)
ALT SERPL W P-5'-P-CCNC: 26 U/L (ref 10–36)
ANION GAP SERPL CALCULATED.3IONS-SCNC: 6.1 MMOL/L (ref 3.6–11.2)
ANISOCYTOSIS BLD QL: ABNORMAL
AST SERPL-CCNC: 21 U/L (ref 10–30)
BASOPHILS # BLD MANUAL: 0.03 10*3/MM3 (ref 0–0.3)
BASOPHILS NFR BLD AUTO: 1 % (ref 0–2)
BILIRUB SERPL-MCNC: 0.3 MG/DL (ref 0.2–1.8)
BUN BLD-MCNC: 19 MG/DL (ref 7–21)
BUN/CREAT SERPL: 20.2 (ref 7–25)
CALCIUM SPEC-SCNC: 8.2 MG/DL (ref 7.7–10)
CHLORIDE SERPL-SCNC: 108 MMOL/L (ref 99–112)
CO2 SERPL-SCNC: 26.9 MMOL/L (ref 24.3–31.9)
CREAT BLD-MCNC: 0.94 MG/DL (ref 0.43–1.29)
DEPRECATED RDW RBC AUTO: 64.4 FL (ref 37–54)
ERYTHROCYTE [DISTWIDTH] IN BLOOD BY AUTOMATED COUNT: 18.7 % (ref 11.5–14.5)
GFR SERPL CREATININE-BSD FRML MDRD: 57 ML/MIN/1.73
GLOBULIN UR ELPH-MCNC: 2.5 GM/DL
GLUCOSE BLD-MCNC: 97 MG/DL (ref 70–110)
HCT VFR BLD AUTO: 31.2 % (ref 37–47)
HGB BLD-MCNC: 10.1 G/DL (ref 12–16)
LYMPHOCYTES # BLD MANUAL: 1.01 10*3/MM3 (ref 1–3)
LYMPHOCYTES NFR BLD MANUAL: 31 % (ref 16–46)
LYMPHOCYTES NFR BLD MANUAL: 5 % (ref 0–12)
MCH RBC QN AUTO: 32.3 PG (ref 27–33)
MCHC RBC AUTO-ENTMCNC: 32.4 G/DL (ref 33–37)
MCV RBC AUTO: 99.7 FL (ref 80–94)
METAMYELOCYTES NFR BLD MANUAL: 2 % (ref 0–0)
MONOCYTES # BLD AUTO: 0.16 10*3/MM3 (ref 0.1–0.9)
MYELOCYTES NFR BLD MANUAL: 1 % (ref 0–0)
NEUTROPHILS # BLD AUTO: 1.95 10*3/MM3 (ref 1.4–6.5)
NEUTROPHILS NFR BLD MANUAL: 54 % (ref 40–75)
NEUTS BAND NFR BLD MANUAL: 6 % (ref 0–8)
NRBC SPEC MANUAL: 3 /100 WBC (ref 0–0)
OSMOLALITY SERPL CALC.SUM OF ELEC: 283.4 MOSM/KG (ref 273–305)
PLAT MORPH BLD: NORMAL
PLATELET # BLD AUTO: 198 10*3/MM3 (ref 130–400)
PMV BLD AUTO: 10.7 FL (ref 6–10)
POIKILOCYTOSIS BLD QL SMEAR: ABNORMAL
POLYCHROMASIA BLD QL SMEAR: ABNORMAL
POTASSIUM BLD-SCNC: 4 MMOL/L (ref 3.5–5.3)
PROT SERPL-MCNC: 6.6 G/DL (ref 6–8)
RBC # BLD AUTO: 3.13 10*6/MM3 (ref 4.2–5.4)
SCAN SLIDE: NORMAL
SODIUM BLD-SCNC: 141 MMOL/L (ref 135–153)
WBC NRBC COR # BLD: 3.25 10*3/MM3 (ref 4.5–12.5)

## 2017-02-20 PROCEDURE — 96367 TX/PROPH/DG ADDL SEQ IV INF: CPT

## 2017-02-20 PROCEDURE — 96417 CHEMO IV INFUS EACH ADDL SEQ: CPT

## 2017-02-20 PROCEDURE — 25010000002 HEPARIN FLUSH (PORCINE) 100 UNIT/ML SOLUTION: Performed by: INTERNAL MEDICINE

## 2017-02-20 PROCEDURE — 25010000002 CARBOPLATIN PER 50 MG: Performed by: INTERNAL MEDICINE

## 2017-02-20 PROCEDURE — 36415 COLL VENOUS BLD VENIPUNCTURE: CPT

## 2017-02-20 PROCEDURE — 96413 CHEMO IV INFUSION 1 HR: CPT

## 2017-02-20 PROCEDURE — 25010000002 PALONOSETRON PER 25 MCG: Performed by: INTERNAL MEDICINE

## 2017-02-20 PROCEDURE — 25010000002 DEXAMETHASONE SODIUM PHOSPHATE 20 MG/5ML SOLUTION 5 ML VIAL: Performed by: INTERNAL MEDICINE

## 2017-02-20 PROCEDURE — 25010000002 DIPHENHYDRAMINE PER 50 MG: Performed by: INTERNAL MEDICINE

## 2017-02-20 PROCEDURE — 25010000002 PACLITAXEL PER 30 MG: Performed by: INTERNAL MEDICINE

## 2017-02-20 PROCEDURE — 96375 TX/PRO/DX INJ NEW DRUG ADDON: CPT

## 2017-02-20 RX ORDER — SODIUM CHLORIDE 0.9 % (FLUSH) 0.9 %
10 SYRINGE (ML) INJECTION AS NEEDED
Status: CANCELLED | OUTPATIENT
Start: 2017-02-27

## 2017-02-20 RX ORDER — SODIUM CHLORIDE 9 MG/ML
250 INJECTION, SOLUTION INTRAVENOUS ONCE
Status: COMPLETED | OUTPATIENT
Start: 2017-02-20 | End: 2017-02-20

## 2017-02-20 RX ORDER — PALONOSETRON 0.05 MG/ML
0.25 INJECTION, SOLUTION INTRAVENOUS ONCE
Status: COMPLETED | OUTPATIENT
Start: 2017-02-20 | End: 2017-02-20

## 2017-02-20 RX ORDER — FAMOTIDINE 10 MG/ML
20 INJECTION, SOLUTION INTRAVENOUS ONCE
Status: COMPLETED | OUTPATIENT
Start: 2017-02-20 | End: 2017-02-20

## 2017-02-20 RX ADMIN — FAMOTIDINE 20 MG: 10 INJECTION, SOLUTION INTRAVENOUS at 11:22

## 2017-02-20 RX ADMIN — DEXAMETHASONE SODIUM PHOSPHATE 12 MG: 4 INJECTION, SOLUTION INTRAMUSCULAR; INTRAVENOUS at 11:41

## 2017-02-20 RX ADMIN — PALONOSETRON HYDROCHLORIDE 0.25 MG: 0.25 INJECTION INTRAVENOUS at 11:40

## 2017-02-20 RX ADMIN — PACLITAXEL 140 MG: 6 INJECTION, SOLUTION INTRAVENOUS at 12:02

## 2017-02-20 RX ADMIN — HEPARIN SODIUM (PORCINE) LOCK FLUSH IV SOLN 100 UNIT/ML 500 UNITS: 100 SOLUTION at 13:53

## 2017-02-20 RX ADMIN — SODIUM CHLORIDE 250 ML: 9 INJECTION, SOLUTION INTRAVENOUS at 11:22

## 2017-02-20 RX ADMIN — DIPHENHYDRAMINE HYDROCHLORIDE 25 MG: 50 INJECTION INTRAMUSCULAR; INTRAVENOUS at 11:28

## 2017-02-20 RX ADMIN — CARBOPLATIN 430 MG: 10 INJECTION, SOLUTION INTRAVENOUS at 13:03

## 2017-02-21 ENCOUNTER — TELEPHONE (OUTPATIENT)
Dept: ONCOLOGY | Facility: HOSPITAL | Age: 77
End: 2017-02-21

## 2017-02-27 ENCOUNTER — INFUSION (OUTPATIENT)
Dept: ONCOLOGY | Facility: HOSPITAL | Age: 77
End: 2017-02-27

## 2017-02-27 ENCOUNTER — LAB (OUTPATIENT)
Dept: ONCOLOGY | Facility: CLINIC | Age: 77
End: 2017-02-27

## 2017-02-27 VITALS
BODY MASS INDEX: 25.56 KG/M2 | OXYGEN SATURATION: 98 % | WEIGHT: 148.9 LBS | TEMPERATURE: 97.5 F | DIASTOLIC BLOOD PRESSURE: 66 MMHG | HEART RATE: 90 BPM | SYSTOLIC BLOOD PRESSURE: 104 MMHG | RESPIRATION RATE: 20 BRPM

## 2017-02-27 DIAGNOSIS — C56.9 OVARIAN CANCER, UNSPECIFIED LATERALITY (HCC): ICD-10-CM

## 2017-02-27 DIAGNOSIS — C48.2 CANCER OF PERITONEUM (HCC): Primary | ICD-10-CM

## 2017-02-27 DIAGNOSIS — C48.2 CANCER OF PERITONEUM (HCC): ICD-10-CM

## 2017-02-27 DIAGNOSIS — C48.2 MALIGNANT NEOPLASM OF PERITONEUM (HCC): Primary | ICD-10-CM

## 2017-02-27 LAB
ALBUMIN SERPL-MCNC: 4 G/DL (ref 3.4–4.8)
ALBUMIN/GLOB SERPL: 1.9 G/DL (ref 1.5–2.5)
ALP SERPL-CCNC: 70 U/L (ref 35–104)
ALT SERPL W P-5'-P-CCNC: 30 U/L (ref 10–36)
ANION GAP SERPL CALCULATED.3IONS-SCNC: 4.7 MMOL/L (ref 3.6–11.2)
AST SERPL-CCNC: 27 U/L (ref 10–30)
BASOPHILS # BLD AUTO: 0.01 10*3/MM3 (ref 0–0.3)
BASOPHILS NFR BLD AUTO: 0.4 % (ref 0–2)
BILIRUB SERPL-MCNC: 0.4 MG/DL (ref 0.2–1.8)
BUN BLD-MCNC: 20 MG/DL (ref 7–21)
BUN/CREAT SERPL: 18.5 (ref 7–25)
CALCIUM SPEC-SCNC: 8.3 MG/DL (ref 7.7–10)
CHLORIDE SERPL-SCNC: 109 MMOL/L (ref 99–112)
CO2 SERPL-SCNC: 26.3 MMOL/L (ref 24.3–31.9)
CREAT BLD-MCNC: 1.08 MG/DL (ref 0.43–1.29)
DEPRECATED RDW RBC AUTO: 67.9 FL (ref 37–54)
EOSINOPHIL # BLD AUTO: 0.01 10*3/MM3 (ref 0–0.7)
EOSINOPHIL NFR BLD AUTO: 0.4 % (ref 0–7)
ERYTHROCYTE [DISTWIDTH] IN BLOOD BY AUTOMATED COUNT: 18.6 % (ref 11.5–14.5)
GFR SERPL CREATININE-BSD FRML MDRD: 49 ML/MIN/1.73
GLOBULIN UR ELPH-MCNC: 2.1 GM/DL
GLUCOSE BLD-MCNC: 92 MG/DL (ref 70–110)
HCT VFR BLD AUTO: 29.4 % (ref 37–47)
HGB BLD-MCNC: 9.8 G/DL (ref 12–16)
IMM GRANULOCYTES # BLD: 0.04 10*3/MM3 (ref 0–0.03)
IMM GRANULOCYTES NFR BLD: 1.4 % (ref 0–0.5)
LYMPHOCYTES # BLD AUTO: 0.99 10*3/MM3 (ref 1–3)
LYMPHOCYTES NFR BLD AUTO: 35.6 % (ref 16–46)
MCH RBC QN AUTO: 33.3 PG (ref 27–33)
MCHC RBC AUTO-ENTMCNC: 33.3 G/DL (ref 33–37)
MCV RBC AUTO: 100 FL (ref 80–94)
MONOCYTES # BLD AUTO: 0.44 10*3/MM3 (ref 0.1–0.9)
MONOCYTES NFR BLD AUTO: 15.8 % (ref 0–12)
NEUTROPHILS # BLD AUTO: 1.29 10*3/MM3 (ref 1.4–6.5)
NEUTROPHILS NFR BLD AUTO: 46.4 % (ref 40–75)
OSMOLALITY SERPL CALC.SUM OF ELEC: 281.7 MOSM/KG (ref 273–305)
PLATELET # BLD AUTO: 200 10*3/MM3 (ref 130–400)
PMV BLD AUTO: 10.8 FL (ref 6–10)
POTASSIUM BLD-SCNC: 4.5 MMOL/L (ref 3.5–5.3)
PROT SERPL-MCNC: 6.1 G/DL (ref 6–8)
RBC # BLD AUTO: 2.94 10*6/MM3 (ref 4.2–5.4)
SODIUM BLD-SCNC: 140 MMOL/L (ref 135–153)
WBC NRBC COR # BLD: 2.78 10*3/MM3 (ref 4.5–12.5)

## 2017-02-27 PROCEDURE — 25010000002 PACLITAXEL PER 30 MG: Performed by: INTERNAL MEDICINE

## 2017-02-27 PROCEDURE — 25010000002 HEPARIN FLUSH (PORCINE) 100 UNIT/ML SOLUTION: Performed by: INTERNAL MEDICINE

## 2017-02-27 PROCEDURE — 25010000002 DEXAMETHASONE SODIUM PHOSPHATE 20 MG/5ML SOLUTION 5 ML VIAL: Performed by: INTERNAL MEDICINE

## 2017-02-27 PROCEDURE — 96375 TX/PRO/DX INJ NEW DRUG ADDON: CPT

## 2017-02-27 PROCEDURE — 96413 CHEMO IV INFUSION 1 HR: CPT

## 2017-02-27 PROCEDURE — 25010000002 DIPHENHYDRAMINE PER 50 MG: Performed by: INTERNAL MEDICINE

## 2017-02-27 RX ORDER — SODIUM CHLORIDE 9 MG/ML
250 INJECTION, SOLUTION INTRAVENOUS ONCE
Status: COMPLETED | OUTPATIENT
Start: 2017-02-27 | End: 2017-02-27

## 2017-02-27 RX ORDER — SODIUM CHLORIDE 0.9 % (FLUSH) 0.9 %
10 SYRINGE (ML) INJECTION AS NEEDED
Status: CANCELLED | OUTPATIENT
Start: 2017-03-06

## 2017-02-27 RX ORDER — FAMOTIDINE 10 MG/ML
20 INJECTION, SOLUTION INTRAVENOUS ONCE
Status: COMPLETED | OUTPATIENT
Start: 2017-02-27 | End: 2017-02-27

## 2017-02-27 RX ADMIN — DEXAMETHASONE SODIUM PHOSPHATE 12 MG: 4 INJECTION, SOLUTION INTRAMUSCULAR; INTRAVENOUS at 10:54

## 2017-02-27 RX ADMIN — PACLITAXEL 140 MG: 6 INJECTION, SOLUTION INTRAVENOUS at 11:22

## 2017-02-27 RX ADMIN — SODIUM CHLORIDE 250 ML: 9 INJECTION, SOLUTION INTRAVENOUS at 10:37

## 2017-02-27 RX ADMIN — DIPHENHYDRAMINE HYDROCHLORIDE 25 MG: 50 INJECTION INTRAMUSCULAR; INTRAVENOUS at 10:39

## 2017-02-27 RX ADMIN — HEPARIN SODIUM (PORCINE) LOCK FLUSH IV SOLN 100 UNIT/ML 500 UNITS: 100 SOLUTION at 12:31

## 2017-02-27 RX ADMIN — FAMOTIDINE 20 MG: 10 INJECTION INTRAVENOUS at 10:37

## 2017-03-06 ENCOUNTER — LAB (OUTPATIENT)
Dept: ONCOLOGY | Facility: CLINIC | Age: 77
End: 2017-03-06

## 2017-03-06 ENCOUNTER — INFUSION (OUTPATIENT)
Dept: ONCOLOGY | Facility: HOSPITAL | Age: 77
End: 2017-03-06

## 2017-03-06 VITALS
DIASTOLIC BLOOD PRESSURE: 76 MMHG | BODY MASS INDEX: 25.23 KG/M2 | RESPIRATION RATE: 20 BRPM | WEIGHT: 147 LBS | TEMPERATURE: 98.5 F | OXYGEN SATURATION: 93 % | SYSTOLIC BLOOD PRESSURE: 113 MMHG | HEART RATE: 90 BPM

## 2017-03-06 DIAGNOSIS — C48.2 MALIGNANT NEOPLASM OF PERITONEUM (HCC): ICD-10-CM

## 2017-03-06 DIAGNOSIS — C48.2 MALIGNANT NEOPLASM OF PERITONEUM (HCC): Primary | ICD-10-CM

## 2017-03-06 DIAGNOSIS — C80.0 DISSEMINATED MALIGNANT NEOPLASM (HCC): Primary | ICD-10-CM

## 2017-03-06 DIAGNOSIS — C48.2 CANCER OF PERITONEUM (HCC): ICD-10-CM

## 2017-03-06 LAB
ALBUMIN SERPL-MCNC: 4 G/DL (ref 3.4–4.8)
ALBUMIN/GLOB SERPL: 2 G/DL (ref 1.5–2.5)
ALP SERPL-CCNC: 64 U/L (ref 35–104)
ALT SERPL W P-5'-P-CCNC: 25 U/L (ref 10–36)
ANION GAP SERPL CALCULATED.3IONS-SCNC: 7.6 MMOL/L (ref 3.6–11.2)
AST SERPL-CCNC: 24 U/L (ref 10–30)
BASOPHILS # BLD AUTO: 0.02 10*3/MM3 (ref 0–0.3)
BASOPHILS NFR BLD AUTO: 0.5 % (ref 0–2)
BILIRUB SERPL-MCNC: 0.4 MG/DL (ref 0.2–1.8)
BUN BLD-MCNC: 20 MG/DL (ref 7–21)
BUN/CREAT SERPL: 16.5 (ref 7–25)
CALCIUM SPEC-SCNC: 7.7 MG/DL (ref 7.7–10)
CHLORIDE SERPL-SCNC: 109 MMOL/L (ref 99–112)
CO2 SERPL-SCNC: 26.4 MMOL/L (ref 24.3–31.9)
CREAT BLD-MCNC: 1.21 MG/DL (ref 0.43–1.29)
DEPRECATED RDW RBC AUTO: 68.2 FL (ref 37–54)
EOSINOPHIL # BLD AUTO: 0.01 10*3/MM3 (ref 0–0.7)
EOSINOPHIL NFR BLD AUTO: 0.3 % (ref 0–7)
ERYTHROCYTE [DISTWIDTH] IN BLOOD BY AUTOMATED COUNT: 18.8 % (ref 11.5–14.5)
GFR SERPL CREATININE-BSD FRML MDRD: 43 ML/MIN/1.73
GLOBULIN UR ELPH-MCNC: 2 GM/DL
GLUCOSE BLD-MCNC: 94 MG/DL (ref 70–110)
HCT VFR BLD AUTO: 28.5 % (ref 37–47)
HGB BLD-MCNC: 9.3 G/DL (ref 12–16)
IMM GRANULOCYTES # BLD: 0.18 10*3/MM3 (ref 0–0.03)
IMM GRANULOCYTES NFR BLD: 4.6 % (ref 0–0.5)
LYMPHOCYTES # BLD AUTO: 1.44 10*3/MM3 (ref 1–3)
LYMPHOCYTES NFR BLD AUTO: 36.8 % (ref 16–46)
MCH RBC QN AUTO: 32.7 PG (ref 27–33)
MCHC RBC AUTO-ENTMCNC: 32.6 G/DL (ref 33–37)
MCV RBC AUTO: 100.4 FL (ref 80–94)
MONOCYTES # BLD AUTO: 0.48 10*3/MM3 (ref 0.1–0.9)
MONOCYTES NFR BLD AUTO: 12.3 % (ref 0–12)
NEUTROPHILS # BLD AUTO: 1.78 10*3/MM3 (ref 1.4–6.5)
NEUTROPHILS NFR BLD AUTO: 45.5 % (ref 40–75)
OSMOLALITY SERPL CALC.SUM OF ELEC: 287.3 MOSM/KG (ref 273–305)
PLATELET # BLD AUTO: 171 10*3/MM3 (ref 130–400)
PMV BLD AUTO: 10.9 FL (ref 6–10)
POTASSIUM BLD-SCNC: 4 MMOL/L (ref 3.5–5.3)
PROT SERPL-MCNC: 6 G/DL (ref 6–8)
RBC # BLD AUTO: 2.84 10*6/MM3 (ref 4.2–5.4)
SODIUM BLD-SCNC: 143 MMOL/L (ref 135–153)
WBC NRBC COR # BLD: 3.91 10*3/MM3 (ref 4.5–12.5)

## 2017-03-06 PROCEDURE — 25010000002 DIPHENHYDRAMINE PER 50 MG: Performed by: INTERNAL MEDICINE

## 2017-03-06 PROCEDURE — 96413 CHEMO IV INFUSION 1 HR: CPT | Performed by: NURSE PRACTITIONER

## 2017-03-06 PROCEDURE — 25010000002 PACLITAXEL PER 30 MG: Performed by: INTERNAL MEDICINE

## 2017-03-06 PROCEDURE — 96367 TX/PROPH/DG ADDL SEQ IV INF: CPT | Performed by: NURSE PRACTITIONER

## 2017-03-06 PROCEDURE — 25010000002 HEPARIN FLUSH (PORCINE) 100 UNIT/ML SOLUTION: Performed by: INTERNAL MEDICINE

## 2017-03-06 PROCEDURE — 25010000002 DEXAMETHASONE SODIUM PHOSPHATE 20 MG/5ML SOLUTION 5 ML VIAL: Performed by: INTERNAL MEDICINE

## 2017-03-06 PROCEDURE — 96375 TX/PRO/DX INJ NEW DRUG ADDON: CPT | Performed by: NURSE PRACTITIONER

## 2017-03-06 RX ORDER — SODIUM CHLORIDE 9 MG/ML
250 INJECTION, SOLUTION INTRAVENOUS ONCE
Status: COMPLETED | OUTPATIENT
Start: 2017-03-06 | End: 2017-03-06

## 2017-03-06 RX ORDER — FAMOTIDINE 10 MG/ML
20 INJECTION, SOLUTION INTRAVENOUS ONCE
Status: COMPLETED | OUTPATIENT
Start: 2017-03-06 | End: 2017-03-06

## 2017-03-06 RX ORDER — SODIUM CHLORIDE 0.9 % (FLUSH) 0.9 %
10 SYRINGE (ML) INJECTION AS NEEDED
Status: DISCONTINUED | OUTPATIENT
Start: 2017-03-06 | End: 2017-03-06 | Stop reason: HOSPADM

## 2017-03-06 RX ORDER — SODIUM CHLORIDE 0.9 % (FLUSH) 0.9 %
10 SYRINGE (ML) INJECTION AS NEEDED
Status: CANCELLED | OUTPATIENT
Start: 2017-03-13

## 2017-03-06 RX ADMIN — FAMOTIDINE 20 MG: 10 INJECTION, SOLUTION INTRAVENOUS at 11:18

## 2017-03-06 RX ADMIN — PACLITAXEL 140 MG: 6 INJECTION, SOLUTION INTRAVENOUS at 11:49

## 2017-03-06 RX ADMIN — SODIUM CHLORIDE 250 ML: 9 INJECTION, SOLUTION INTRAVENOUS at 11:02

## 2017-03-06 RX ADMIN — DIPHENHYDRAMINE HYDROCHLORIDE 25 MG: 50 INJECTION INTRAMUSCULAR; INTRAVENOUS at 11:20

## 2017-03-06 RX ADMIN — DEXAMETHASONE SODIUM PHOSPHATE 12 MG: 4 INJECTION, SOLUTION INTRAMUSCULAR; INTRAVENOUS at 11:02

## 2017-03-06 RX ADMIN — Medication 10 ML: at 13:15

## 2017-03-06 RX ADMIN — HEPARIN SODIUM (PORCINE) LOCK FLUSH IV SOLN 100 UNIT/ML 500 UNITS: 100 SOLUTION at 13:15

## 2017-03-07 LAB — CANCER AG125 SERPL-ACNC: 58.9 U/ML (ref 0–38.1)

## 2017-03-09 DIAGNOSIS — C48.2 MALIGNANT NEOPLASM OF PERITONEUM (HCC): ICD-10-CM

## 2017-03-09 RX ORDER — SODIUM CHLORIDE 9 MG/ML
250 INJECTION, SOLUTION INTRAVENOUS ONCE
Status: CANCELLED | OUTPATIENT
Start: 2017-03-13

## 2017-03-09 RX ORDER — FAMOTIDINE 10 MG/ML
20 INJECTION, SOLUTION INTRAVENOUS ONCE
Status: CANCELLED | OUTPATIENT
Start: 2017-04-04

## 2017-03-09 RX ORDER — FAMOTIDINE 10 MG/ML
20 INJECTION, SOLUTION INTRAVENOUS ONCE
Status: CANCELLED | OUTPATIENT
Start: 2017-03-13

## 2017-03-09 RX ORDER — FAMOTIDINE 10 MG/ML
20 INJECTION, SOLUTION INTRAVENOUS ONCE
Status: CANCELLED | OUTPATIENT
Start: 2017-03-28

## 2017-03-09 RX ORDER — PALONOSETRON 0.05 MG/ML
0.25 INJECTION, SOLUTION INTRAVENOUS ONCE
Status: CANCELLED | OUTPATIENT
Start: 2017-03-13

## 2017-03-09 RX ORDER — SODIUM CHLORIDE 9 MG/ML
250 INJECTION, SOLUTION INTRAVENOUS ONCE
Status: CANCELLED | OUTPATIENT
Start: 2017-04-04

## 2017-03-09 RX ORDER — SODIUM CHLORIDE 9 MG/ML
250 INJECTION, SOLUTION INTRAVENOUS ONCE
Status: CANCELLED | OUTPATIENT
Start: 2017-03-28

## 2017-03-13 ENCOUNTER — INFUSION (OUTPATIENT)
Dept: ONCOLOGY | Facility: HOSPITAL | Age: 77
End: 2017-03-13

## 2017-03-13 ENCOUNTER — DOCUMENTATION (OUTPATIENT)
Dept: ONCOLOGY | Facility: HOSPITAL | Age: 77
End: 2017-03-13

## 2017-03-13 VITALS
SYSTOLIC BLOOD PRESSURE: 127 MMHG | BODY MASS INDEX: 25.61 KG/M2 | DIASTOLIC BLOOD PRESSURE: 82 MMHG | RESPIRATION RATE: 18 BRPM | HEART RATE: 98 BPM | TEMPERATURE: 98.5 F | WEIGHT: 149.2 LBS | OXYGEN SATURATION: 95 %

## 2017-03-13 DIAGNOSIS — C48.2 MALIGNANT NEOPLASM OF PERITONEUM (HCC): Primary | ICD-10-CM

## 2017-03-13 DIAGNOSIS — C48.2 PRIMARY CANCER OF PERITONEUM (HCC): Primary | ICD-10-CM

## 2017-03-13 LAB
ALBUMIN SERPL-MCNC: 4 G/DL (ref 3.4–4.8)
ALBUMIN/GLOB SERPL: 1.9 G/DL (ref 1.5–2.5)
ALP SERPL-CCNC: 73 U/L (ref 35–104)
ALT SERPL W P-5'-P-CCNC: 22 U/L (ref 10–36)
ANION GAP SERPL CALCULATED.3IONS-SCNC: 8.1 MMOL/L (ref 3.6–11.2)
ANISOCYTOSIS BLD QL: ABNORMAL
AST SERPL-CCNC: 25 U/L (ref 10–30)
BILIRUB SERPL-MCNC: 0.4 MG/DL (ref 0.2–1.8)
BUN BLD-MCNC: 23 MG/DL (ref 7–21)
BUN/CREAT SERPL: 18.4 (ref 7–25)
CALCIUM SPEC-SCNC: 7 MG/DL (ref 7.7–10)
CHLORIDE SERPL-SCNC: 108 MMOL/L (ref 99–112)
CO2 SERPL-SCNC: 25.9 MMOL/L (ref 24.3–31.9)
CREAT BLD-MCNC: 1.25 MG/DL (ref 0.43–1.29)
DACRYOCYTES BLD QL SMEAR: ABNORMAL
DEPRECATED RDW RBC AUTO: 70.4 FL (ref 37–54)
ERYTHROCYTE [DISTWIDTH] IN BLOOD BY AUTOMATED COUNT: 18.9 % (ref 11.5–14.5)
GFR SERPL CREATININE-BSD FRML MDRD: 41 ML/MIN/1.73
GLOBULIN UR ELPH-MCNC: 2.1 GM/DL
GLUCOSE BLD-MCNC: 97 MG/DL (ref 70–110)
HCT VFR BLD AUTO: 25.2 % (ref 37–47)
HGB BLD-MCNC: 8.3 G/DL (ref 12–16)
LYMPHOCYTES # BLD MANUAL: 1.07 10*3/MM3 (ref 1–3)
LYMPHOCYTES NFR BLD MANUAL: 13 % (ref 0–12)
LYMPHOCYTES NFR BLD MANUAL: 21 % (ref 16–46)
MCH RBC QN AUTO: 33.5 PG (ref 27–33)
MCHC RBC AUTO-ENTMCNC: 32.9 G/DL (ref 33–37)
MCV RBC AUTO: 101.6 FL (ref 80–94)
MONOCYTES # BLD AUTO: 0.66 10*3/MM3 (ref 0.1–0.9)
NEUTROPHILS # BLD AUTO: 3.37 10*3/MM3 (ref 1.4–6.5)
NEUTROPHILS NFR BLD MANUAL: 62 % (ref 40–75)
NEUTS BAND NFR BLD MANUAL: 4 % (ref 0–8)
NRBC SPEC MANUAL: 1 /100 WBC (ref 0–0)
OSMOLALITY SERPL CALC.SUM OF ELEC: 286.7 MOSM/KG (ref 273–305)
PLAT MORPH BLD: NORMAL
PLATELET # BLD AUTO: 150 10*3/MM3 (ref 130–400)
PMV BLD AUTO: 11 FL (ref 6–10)
POTASSIUM BLD-SCNC: 3.5 MMOL/L (ref 3.5–5.3)
PROT SERPL-MCNC: 6.1 G/DL (ref 6–8)
RBC # BLD AUTO: 2.48 10*6/MM3 (ref 4.2–5.4)
SCAN SLIDE: NORMAL
SODIUM BLD-SCNC: 142 MMOL/L (ref 135–153)
WBC NRBC COR # BLD: 5.1 10*3/MM3 (ref 4.5–12.5)

## 2017-03-13 PROCEDURE — 96361 HYDRATE IV INFUSION ADD-ON: CPT

## 2017-03-13 PROCEDURE — 25010000002 DIPHENHYDRAMINE PER 50 MG: Performed by: INTERNAL MEDICINE

## 2017-03-13 PROCEDURE — 25010000002 HEPARIN FLUSH (PORCINE) 100 UNIT/ML SOLUTION: Performed by: INTERNAL MEDICINE

## 2017-03-13 PROCEDURE — 96375 TX/PRO/DX INJ NEW DRUG ADDON: CPT

## 2017-03-13 PROCEDURE — 25010000002 PACLITAXEL PER 30 MG: Performed by: INTERNAL MEDICINE

## 2017-03-13 PROCEDURE — 25010000002 DEXAMETHASONE SODIUM PHOSPHATE 20 MG/5ML SOLUTION 5 ML VIAL: Performed by: INTERNAL MEDICINE

## 2017-03-13 PROCEDURE — 96413 CHEMO IV INFUSION 1 HR: CPT

## 2017-03-13 PROCEDURE — 25010000002 CARBOPLATIN PER 50 MG: Performed by: INTERNAL MEDICINE

## 2017-03-13 PROCEDURE — 96367 TX/PROPH/DG ADDL SEQ IV INF: CPT

## 2017-03-13 PROCEDURE — 96417 CHEMO IV INFUS EACH ADDL SEQ: CPT

## 2017-03-13 PROCEDURE — 25010000002 PALONOSETRON PER 25 MCG: Performed by: INTERNAL MEDICINE

## 2017-03-13 RX ORDER — PALONOSETRON 0.05 MG/ML
0.25 INJECTION, SOLUTION INTRAVENOUS ONCE
Status: COMPLETED | OUTPATIENT
Start: 2017-03-13 | End: 2017-03-13

## 2017-03-13 RX ORDER — SODIUM CHLORIDE 9 MG/ML
250 INJECTION, SOLUTION INTRAVENOUS ONCE
Status: COMPLETED | OUTPATIENT
Start: 2017-03-13 | End: 2017-03-13

## 2017-03-13 RX ORDER — SODIUM CHLORIDE 0.9 % (FLUSH) 0.9 %
10 SYRINGE (ML) INJECTION AS NEEDED
Status: CANCELLED | OUTPATIENT
Start: 2017-03-20

## 2017-03-13 RX ORDER — SODIUM CHLORIDE 0.9 % (FLUSH) 0.9 %
10 SYRINGE (ML) INJECTION AS NEEDED
Status: DISCONTINUED | OUTPATIENT
Start: 2017-03-13 | End: 2017-03-13 | Stop reason: HOSPADM

## 2017-03-13 RX ORDER — FAMOTIDINE 10 MG/ML
20 INJECTION, SOLUTION INTRAVENOUS ONCE
Status: COMPLETED | OUTPATIENT
Start: 2017-03-13 | End: 2017-03-13

## 2017-03-13 RX ADMIN — DIPHENHYDRAMINE HYDROCHLORIDE 25 MG: 50 INJECTION INTRAMUSCULAR; INTRAVENOUS at 11:20

## 2017-03-13 RX ADMIN — Medication 10 ML: at 14:22

## 2017-03-13 RX ADMIN — SODIUM CHLORIDE 250 ML: 9 INJECTION, SOLUTION INTRAVENOUS at 11:15

## 2017-03-13 RX ADMIN — DEXAMETHASONE SODIUM PHOSPHATE 12 MG: 4 INJECTION, SOLUTION INTRAMUSCULAR; INTRAVENOUS at 11:37

## 2017-03-13 RX ADMIN — PACLITAXEL 140 MG: 6 INJECTION, SOLUTION INTRAVENOUS at 12:19

## 2017-03-13 RX ADMIN — FAMOTIDINE 20 MG: 10 INJECTION, SOLUTION INTRAVENOUS at 11:17

## 2017-03-13 RX ADMIN — PALONOSETRON HYDROCHLORIDE 0.25 MG: 0.25 INJECTION INTRAVENOUS at 11:16

## 2017-03-13 RX ADMIN — CARBOPLATIN 360 MG: 10 INJECTION, SOLUTION INTRAVENOUS at 13:23

## 2017-03-13 RX ADMIN — HEPARIN SODIUM (PORCINE) LOCK FLUSH IV SOLN 100 UNIT/ML 500 UNITS: 100 SOLUTION at 14:24

## 2017-03-14 ENCOUNTER — TELEPHONE (OUTPATIENT)
Dept: ONCOLOGY | Facility: HOSPITAL | Age: 77
End: 2017-03-14

## 2017-03-14 NOTE — TELEPHONE ENCOUNTER
Spoke with pt this am to see how she is doing. She said she is doing real good this morning. Pt denies any problems, questions, or concerns at this time. I encouraged pt to call us back if she has any questions or problems.

## 2017-03-20 ENCOUNTER — INFUSION (OUTPATIENT)
Dept: ONCOLOGY | Facility: HOSPITAL | Age: 77
End: 2017-03-20

## 2017-03-21 ENCOUNTER — TELEPHONE (OUTPATIENT)
Dept: ONCOLOGY | Facility: HOSPITAL | Age: 77
End: 2017-03-21

## 2017-03-21 ENCOUNTER — APPOINTMENT (OUTPATIENT)
Dept: ONCOLOGY | Facility: HOSPITAL | Age: 77
End: 2017-03-21

## 2017-03-21 ENCOUNTER — HOSPITAL ENCOUNTER (EMERGENCY)
Facility: HOSPITAL | Age: 77
Discharge: HOME | End: 2017-03-21
Attending: EMERGENCY MEDICINE | Admitting: EMERGENCY MEDICINE

## 2017-03-21 VITALS
DIASTOLIC BLOOD PRESSURE: 53 MMHG | BODY MASS INDEX: 24.75 KG/M2 | SYSTOLIC BLOOD PRESSURE: 119 MMHG | OXYGEN SATURATION: 98 % | HEART RATE: 86 BPM | HEIGHT: 64 IN | RESPIRATION RATE: 18 BRPM | WEIGHT: 145 LBS | TEMPERATURE: 98 F

## 2017-03-21 DIAGNOSIS — E83.51 HYPOCALCEMIA: Primary | ICD-10-CM

## 2017-03-21 LAB
ALBUMIN SERPL-MCNC: 3.9 G/DL (ref 3.4–4.8)
ALBUMIN/GLOB SERPL: 1.7 G/DL (ref 1.5–2.5)
ALP SERPL-CCNC: 61 U/L (ref 35–104)
ALT SERPL W P-5'-P-CCNC: 22 U/L (ref 10–36)
ANION GAP SERPL CALCULATED.3IONS-SCNC: 8.6 MMOL/L (ref 3.6–11.2)
AST SERPL-CCNC: 22 U/L (ref 10–30)
BASOPHILS # BLD AUTO: 0.01 10*3/MM3 (ref 0–0.3)
BASOPHILS NFR BLD AUTO: 0.2 % (ref 0–2)
BILIRUB SERPL-MCNC: 0.3 MG/DL (ref 0.2–1.8)
BUN BLD-MCNC: 31 MG/DL (ref 7–21)
BUN/CREAT SERPL: 18.6 (ref 7–25)
CALCIUM SPEC-SCNC: 6.8 MG/DL (ref 7.7–10)
CHLORIDE SERPL-SCNC: 108 MMOL/L (ref 99–112)
CO2 SERPL-SCNC: 25.4 MMOL/L (ref 24.3–31.9)
CREAT BLD-MCNC: 1.67 MG/DL (ref 0.43–1.29)
DEPRECATED RDW RBC AUTO: 64.5 FL (ref 37–54)
EOSINOPHIL # BLD AUTO: 0.02 10*3/MM3 (ref 0–0.7)
EOSINOPHIL NFR BLD AUTO: 0.4 % (ref 0–7)
ERYTHROCYTE [DISTWIDTH] IN BLOOD BY AUTOMATED COUNT: 17.3 % (ref 11.5–14.5)
GFR SERPL CREATININE-BSD FRML MDRD: 30 ML/MIN/1.73
GLOBULIN UR ELPH-MCNC: 2.3 GM/DL
GLUCOSE BLD-MCNC: 100 MG/DL (ref 70–110)
HCT VFR BLD AUTO: 24.8 % (ref 37–47)
HGB BLD-MCNC: 8.1 G/DL (ref 12–16)
IMM GRANULOCYTES # BLD: 0.06 10*3/MM3 (ref 0–0.03)
IMM GRANULOCYTES NFR BLD: 1.2 % (ref 0–0.5)
LYMPHOCYTES # BLD AUTO: 1.19 10*3/MM3 (ref 1–3)
LYMPHOCYTES NFR BLD AUTO: 24.1 % (ref 16–46)
MAGNESIUM SERPL-MCNC: 0.8 MG/DL (ref 1.7–2.6)
MCH RBC QN AUTO: 33.8 PG (ref 27–33)
MCHC RBC AUTO-ENTMCNC: 32.7 G/DL (ref 33–37)
MCV RBC AUTO: 103.3 FL (ref 80–94)
MONOCYTES # BLD AUTO: 0.61 10*3/MM3 (ref 0.1–0.9)
MONOCYTES NFR BLD AUTO: 12.4 % (ref 0–12)
NEUTROPHILS # BLD AUTO: 3.04 10*3/MM3 (ref 1.4–6.5)
NEUTROPHILS NFR BLD AUTO: 61.7 % (ref 40–75)
OSMOLALITY SERPL CALC.SUM OF ELEC: 289.7 MOSM/KG (ref 273–305)
PLATELET # BLD AUTO: 126 10*3/MM3 (ref 130–400)
PMV BLD AUTO: 10.8 FL (ref 6–10)
POTASSIUM BLD-SCNC: 3.8 MMOL/L (ref 3.5–5.3)
PROT SERPL-MCNC: 6.2 G/DL (ref 6–8)
RBC # BLD AUTO: 2.4 10*6/MM3 (ref 4.2–5.4)
SODIUM BLD-SCNC: 142 MMOL/L (ref 135–153)
WBC NRBC COR # BLD: 4.93 10*3/MM3 (ref 4.5–12.5)

## 2017-03-21 PROCEDURE — 85025 COMPLETE CBC W/AUTO DIFF WBC: CPT | Performed by: EMERGENCY MEDICINE

## 2017-03-21 PROCEDURE — 80053 COMPREHEN METABOLIC PANEL: CPT | Performed by: EMERGENCY MEDICINE

## 2017-03-21 PROCEDURE — 99284 EMERGENCY DEPT VISIT MOD MDM: CPT

## 2017-03-21 PROCEDURE — 96372 THER/PROPH/DIAG INJ SC/IM: CPT

## 2017-03-21 PROCEDURE — 96366 THER/PROPH/DIAG IV INF ADDON: CPT

## 2017-03-21 PROCEDURE — 25010000002 MAGNESIUM SULFATE 2 GM/50ML SOLUTION: Performed by: EMERGENCY MEDICINE

## 2017-03-21 PROCEDURE — 96375 TX/PRO/DX INJ NEW DRUG ADDON: CPT

## 2017-03-21 PROCEDURE — 25010000002 CYANOCOBALAMIN PER 1000 MCG: Performed by: EMERGENCY MEDICINE

## 2017-03-21 PROCEDURE — 96365 THER/PROPH/DIAG IV INF INIT: CPT

## 2017-03-21 PROCEDURE — 83735 ASSAY OF MAGNESIUM: CPT | Performed by: EMERGENCY MEDICINE

## 2017-03-21 RX ORDER — CYANOCOBALAMIN 1000 UG/ML
1000 INJECTION, SOLUTION INTRAMUSCULAR; SUBCUTANEOUS
Status: DISCONTINUED | OUTPATIENT
Start: 2017-03-21 | End: 2017-03-21 | Stop reason: HOSPADM

## 2017-03-21 RX ORDER — SODIUM CHLORIDE 0.9 % (FLUSH) 0.9 %
10 SYRINGE (ML) INJECTION AS NEEDED
Status: DISCONTINUED | OUTPATIENT
Start: 2017-03-21 | End: 2017-03-21 | Stop reason: HOSPADM

## 2017-03-21 RX ORDER — MAGNESIUM SULFATE HEPTAHYDRATE 40 MG/ML
2 INJECTION, SOLUTION INTRAVENOUS ONCE
Status: COMPLETED | OUTPATIENT
Start: 2017-03-21 | End: 2017-03-21

## 2017-03-21 RX ORDER — CALCIUM CARBONATE 200(500)MG
3 TABLET,CHEWABLE ORAL 3 TIMES DAILY PRN
Status: DISCONTINUED | OUTPATIENT
Start: 2017-03-21 | End: 2017-03-21 | Stop reason: HOSPADM

## 2017-03-21 RX ADMIN — MAGNESIUM SULFATE IN WATER 2 G: 40 INJECTION, SOLUTION INTRAVENOUS at 12:44

## 2017-03-21 RX ADMIN — CALCIUM CHLORIDE 1 G: 100 INJECTION, SOLUTION INTRAVENOUS at 11:04

## 2017-03-21 RX ADMIN — CYANOCOBALAMIN 1000 MCG: 1000 INJECTION, SOLUTION INTRAMUSCULAR at 12:18

## 2017-03-21 RX ADMIN — Medication 3 TABLET: at 12:19

## 2017-03-21 NOTE — TELEPHONE ENCOUNTER
----- Message from Jailene Lenz sent at 3/20/2017  8:51 AM EDT -----  Regarding: PLEASE CALL  Contact: [phone]  The patient is too sick to come in today.  Per Dr. Warner, I'm moving her out to next Tuesday.  Mr. Yoo would appreciate a call from someone today to check on her.

## 2017-03-21 NOTE — ED NOTES
Pt resting quietly on stretcher with no complaints.  Pt AAOx4 with no resp distress noted.  Pt denies any needs at this time.  Skin PWD.  Pt family at bedside. Will continue to monitor and follow plan of care.     Laura Cee RN  03/21/17 [id number]

## 2017-03-21 NOTE — ED NOTES
Pt discharged home with family, via wheelchair, AAOx3 with NADN.       Laura Cee RN  03/21/17 [id number]

## 2017-03-21 NOTE — ED NOTES
Pt resting quietly on stretcher with no complaints. Pt AAOx4 with no resp distress noted. Pt denies any needs at this time. Skin PWD. Pt family at bedside. Will continue to monitor and follow plan of care. Pt continues to complain that she is having cold and numbness to her toes. MD notified.     Laura Cee RN  03/21/17 7508

## 2017-03-21 NOTE — ED NOTES
Pt resting quietly on stretcher with no complaints.  Pt AAOx4 with no resp distress noted.  Pt denies any needs at this time.  Skin PWD.  Pt family at bedside. Will continue to monitor and follow plan of care.  Pt reports that she is having cold and numbness to her toes.  MD notified.     Laura Cee RN  03/21/17 9799

## 2017-03-21 NOTE — TELEPHONE ENCOUNTER
Phone call returned; spoke with patient's spouse and he stated she had fallen in the bathroom and has became increasingly unsteady.  Encouraged spouse to have patient evaluated either by her primary physician (which is her nephew and makes home visits according to the spouse) or evaluated in the ER.  Instructed to call if symptoms worsen or persist with understanding verbalized.

## 2017-03-21 NOTE — ED NOTES
Pt reports that she is currently taking chemotherapy for ovarian cancer and sees Dr. Warner.  Pt reports that she has been very weak and has fallen 2x in the past couple days.  Pt reports that she has been having some coldness in her hands and feet.  Pt reports that her PCP performed labs and informed her that some of her levels were low and that she needed to come to the ER.  PT family at bedside.     Laura Cee RN  03/21/17 [id number]

## 2017-03-23 ENCOUNTER — TELEPHONE (OUTPATIENT)
Dept: GYNECOLOGIC ONCOLOGY | Facility: CLINIC | Age: 77
End: 2017-03-23

## 2017-03-23 NOTE — TELEPHONE ENCOUNTER
GYN Oncology    Telephone Call    I called to touch base with the patient's  as I estimated she would be nearing the end of treatment shortly.  From just a brief glance at the chart it looks like she's been having a lot of symptoms - falls, anemia, potentially some neuropathy.  She visits with Dr. Warner next week.  Her  understands that I feel she will need a treatment holiday once therapy finishes and that likely she would not have a performance status for us to consider surgery.  I will continue to look at the chart from time to time and probably call them in a month to touch base.    Jailene Tran MD  03/23/17  2:47 PM

## 2017-03-26 NOTE — ED PROVIDER NOTES
Subjective   Patient is a 77 y.o. female presenting with weakness.   History provided by:  Patient  Weakness - Generalized   Severity:  Mild  Onset quality:  Gradual  Timing:  Constant  Progression:  Worsening  Chronicity:  Chronic  Relieved by:  Nothing  Worsened by:  Standing and activity  Ineffective treatments:  None tried  Associated symptoms: shortness of breath    Associated symptoms: no abdominal pain, no chest pain, no dysuria and no fever        Review of Systems   Constitutional: Negative.  Negative for fever.   HENT: Negative.    Respiratory: Positive for shortness of breath.    Cardiovascular: Negative.  Negative for chest pain.   Gastrointestinal: Negative.  Negative for abdominal pain.   Endocrine: Negative.    Genitourinary: Negative.  Negative for dysuria.   Skin: Negative.    Psychiatric/Behavioral: Negative.    All other systems reviewed and are negative.      Past Medical History:   Diagnosis Date   • Arthritis    • Cancer     brain tumor   • Chronic constipation    • Chronic narcotic use     Arthritis   • Chronic renal insufficiency    • Depression    • GERD (gastroesophageal reflux disease)    • Ovarian cancer        Allergies   Allergen Reactions   • Sulfa Antibiotics Rash       Past Surgical History:   Procedure Laterality Date   • BACK SURGERY  2012   • BRAIN TUMOR EXCISION  2000    Cancerous. Treated with radiation after surgery.    • COLONOSCOPY N/A 9/30/2016    Procedure: COLONOSCOPY;  Surgeon: Win Us MD;  Location: [facility];  Service:    • ENDOSCOPY N/A 9/30/2016    Procedure: ESOPHAGOGASTRODUODENOSCOPY;  Surgeon: Win Us MD;  Location: [facility] OR;  Service:    • PORTACATH PLACEMENT N/A 11/4/2016    Procedure: INSERTION OF PORTACATH;  Surgeon: Win Us MD;  Location: [facility];  Service:    • TOTAL ABDOMINAL HYSTERECTOMY  1977    Benign disease.  One ovary remains in situ.       Family History   Problem Relation Age of Onset   • Heart disease Mother    •  Hypertension Mother    • Cancer Sister      Brain & Breast       Social History     Social History   • Marital status:      Spouse name: N/A   • Number of children: N/A   • Years of education: N/A     Social History Main Topics   • Smoking status: Former Smoker     Packs/day: 1.00     Years: 20.00     Quit date: 9/29/1996   • Smokeless tobacco: Never Used   • Alcohol use No   • Drug use: No   • Sexual activity: Defer     Other Topics Concern   • None     Social History Narrative           Objective   Physical Exam   Constitutional: She is oriented to person, place, and time. She appears well-developed and well-nourished. No distress.   HENT:   Head: Normocephalic and atraumatic.   Right Ear: External ear normal.   Left Ear: External ear normal.   Nose: Nose normal.   Eyes: Conjunctivae and EOM are normal. Pupils are equal, round, and reactive to light.   Neck: Normal range of motion. Neck supple. No JVD present. No tracheal deviation present.   Cardiovascular: Normal rate, regular rhythm and normal heart sounds.    No murmur heard.  Pulmonary/Chest: Effort normal and breath sounds normal. No respiratory distress. She has no wheezes.   Abdominal: Soft. Bowel sounds are normal. There is no tenderness.   Musculoskeletal: Normal range of motion. She exhibits no edema or deformity.   Neurological: She is alert and oriented to person, place, and time. No cranial nerve deficit.   Skin: Skin is warm and dry. No rash noted. She is not diaphoretic. No erythema. No pallor.   Psychiatric: She has a normal mood and affect. Her behavior is normal. Thought content normal.   Nursing note and vitals reviewed.      Procedures         ED Course  ED Course                  MDM  Number of Diagnoses or Management Options  Hypocalcemia:       Final diagnoses:   Hypocalcemia            Santo Yao MD  03/26/17 0805

## 2017-03-27 ENCOUNTER — APPOINTMENT (OUTPATIENT)
Dept: ONCOLOGY | Facility: HOSPITAL | Age: 77
End: 2017-03-27

## 2017-03-28 ENCOUNTER — INFUSION (OUTPATIENT)
Dept: ONCOLOGY | Facility: HOSPITAL | Age: 77
End: 2017-03-28

## 2017-03-28 ENCOUNTER — OFFICE VISIT (OUTPATIENT)
Dept: ONCOLOGY | Facility: CLINIC | Age: 77
End: 2017-03-28

## 2017-03-28 ENCOUNTER — DOCUMENTATION (OUTPATIENT)
Dept: ONCOLOGY | Facility: HOSPITAL | Age: 77
End: 2017-03-28

## 2017-03-28 VITALS
DIASTOLIC BLOOD PRESSURE: 84 MMHG | WEIGHT: 144 LBS | BODY MASS INDEX: 24.72 KG/M2 | OXYGEN SATURATION: 96 % | SYSTOLIC BLOOD PRESSURE: 129 MMHG | RESPIRATION RATE: 18 BRPM | TEMPERATURE: 97.7 F | HEART RATE: 78 BPM

## 2017-03-28 VITALS
RESPIRATION RATE: 18 BRPM | BODY MASS INDEX: 24.72 KG/M2 | HEART RATE: 78 BPM | OXYGEN SATURATION: 96 % | WEIGHT: 144 LBS | TEMPERATURE: 97.7 F | SYSTOLIC BLOOD PRESSURE: 129 MMHG | DIASTOLIC BLOOD PRESSURE: 84 MMHG

## 2017-03-28 DIAGNOSIS — C48.2 MALIGNANT NEOPLASM OF PERITONEUM (HCC): ICD-10-CM

## 2017-03-28 DIAGNOSIS — C48.2 MALIGNANT NEOPLASM OF PERITONEUM (HCC): Primary | ICD-10-CM

## 2017-03-28 DIAGNOSIS — C48.2 PRIMARY CANCER OF PERITONEUM (HCC): Primary | ICD-10-CM

## 2017-03-28 LAB
ALBUMIN SERPL-MCNC: 4.3 G/DL (ref 3.4–4.8)
ALBUMIN/GLOB SERPL: 1.9 G/DL (ref 1.5–2.5)
ALP SERPL-CCNC: 69 U/L (ref 35–104)
ALT SERPL W P-5'-P-CCNC: 22 U/L (ref 10–36)
ANION GAP SERPL CALCULATED.3IONS-SCNC: 8.3 MMOL/L (ref 3.6–11.2)
ANISOCYTOSIS BLD QL: ABNORMAL
AST SERPL-CCNC: 23 U/L (ref 10–30)
BILIRUB SERPL-MCNC: 0.3 MG/DL (ref 0.2–1.8)
BUN BLD-MCNC: 20 MG/DL (ref 7–21)
BUN/CREAT SERPL: 15.9 (ref 7–25)
CALCIUM SPEC-SCNC: 7.6 MG/DL (ref 7.7–10)
CHLORIDE SERPL-SCNC: 112 MMOL/L (ref 99–112)
CO2 SERPL-SCNC: 23.7 MMOL/L (ref 24.3–31.9)
CREAT BLD-MCNC: 1.26 MG/DL (ref 0.43–1.29)
DACRYOCYTES BLD QL SMEAR: ABNORMAL
DEPRECATED RDW RBC AUTO: 68.2 FL (ref 37–54)
EOSINOPHIL # BLD MANUAL: 0.04 10*3/MM3 (ref 0–0.7)
EOSINOPHIL NFR BLD MANUAL: 1 % (ref 0–7)
ERYTHROCYTE [DISTWIDTH] IN BLOOD BY AUTOMATED COUNT: 17.8 % (ref 11.5–14.5)
GFR SERPL CREATININE-BSD FRML MDRD: 41 ML/MIN/1.73
GLOBULIN UR ELPH-MCNC: 2.3 GM/DL
GLUCOSE BLD-MCNC: 99 MG/DL (ref 70–110)
HCT VFR BLD AUTO: 27.8 % (ref 37–47)
HGB BLD-MCNC: 8.9 G/DL (ref 12–16)
LYMPHOCYTES # BLD MANUAL: 1.13 10*3/MM3 (ref 1–3)
LYMPHOCYTES NFR BLD MANUAL: 19 % (ref 0–12)
LYMPHOCYTES NFR BLD MANUAL: 26 % (ref 16–46)
MAGNESIUM SERPL-MCNC: 0.9 MG/DL (ref 1.7–2.6)
MCH RBC QN AUTO: 33.7 PG (ref 27–33)
MCHC RBC AUTO-ENTMCNC: 32 G/DL (ref 33–37)
MCV RBC AUTO: 105.3 FL (ref 80–94)
MONOCYTES # BLD AUTO: 0.82 10*3/MM3 (ref 0.1–0.9)
NEUTROPHILS # BLD AUTO: 2.34 10*3/MM3 (ref 1.4–6.5)
NEUTROPHILS NFR BLD MANUAL: 52 % (ref 40–75)
NEUTS BAND NFR BLD MANUAL: 2 % (ref 0–8)
OSMOLALITY SERPL CALC.SUM OF ELEC: 289.5 MOSM/KG (ref 273–305)
OVALOCYTES BLD QL SMEAR: ABNORMAL
PLAT MORPH BLD: NORMAL
PLATELET # BLD AUTO: 174 10*3/MM3 (ref 130–400)
PMV BLD AUTO: 10.4 FL (ref 6–10)
POTASSIUM BLD-SCNC: 3.8 MMOL/L (ref 3.5–5.3)
PROT SERPL-MCNC: 6.6 G/DL (ref 6–8)
RBC # BLD AUTO: 2.64 10*6/MM3 (ref 4.2–5.4)
SCAN SLIDE: NORMAL
SODIUM BLD-SCNC: 144 MMOL/L (ref 135–153)
WBC NRBC COR # BLD: 4.33 10*3/MM3 (ref 4.5–12.5)

## 2017-03-28 PROCEDURE — 25010000002 DEXAMETHASONE SODIUM PHOSPHATE 20 MG/5ML SOLUTION 5 ML VIAL: Performed by: INTERNAL MEDICINE

## 2017-03-28 PROCEDURE — 25010000002 DIPHENHYDRAMINE PER 50 MG: Performed by: INTERNAL MEDICINE

## 2017-03-28 PROCEDURE — 96368 THER/DIAG CONCURRENT INF: CPT | Performed by: NURSE PRACTITIONER

## 2017-03-28 PROCEDURE — 25010000002 PACLITAXEL PER 30 MG: Performed by: INTERNAL MEDICINE

## 2017-03-28 PROCEDURE — 96413 CHEMO IV INFUSION 1 HR: CPT | Performed by: NURSE PRACTITIONER

## 2017-03-28 PROCEDURE — 96361 HYDRATE IV INFUSION ADD-ON: CPT | Performed by: NURSE PRACTITIONER

## 2017-03-28 PROCEDURE — 96367 TX/PROPH/DG ADDL SEQ IV INF: CPT | Performed by: NURSE PRACTITIONER

## 2017-03-28 PROCEDURE — 25010000002 HEPARIN FLUSH (PORCINE) 100 UNIT/ML SOLUTION

## 2017-03-28 PROCEDURE — 25010000002 MAGNESIUM SULFATE IN D5W 1G/100ML (PREMIX) 10-5 MG/ML-% SOLUTION: Performed by: INTERNAL MEDICINE

## 2017-03-28 PROCEDURE — 99214 OFFICE O/P EST MOD 30 MIN: CPT | Performed by: INTERNAL MEDICINE

## 2017-03-28 PROCEDURE — 96375 TX/PRO/DX INJ NEW DRUG ADDON: CPT | Performed by: NURSE PRACTITIONER

## 2017-03-28 RX ORDER — FAMOTIDINE 10 MG/ML
20 INJECTION, SOLUTION INTRAVENOUS ONCE
Status: COMPLETED | OUTPATIENT
Start: 2017-03-28 | End: 2017-03-28

## 2017-03-28 RX ORDER — SODIUM CHLORIDE 0.9 % (FLUSH) 0.9 %
10 SYRINGE (ML) INJECTION AS NEEDED
Status: DISCONTINUED | OUTPATIENT
Start: 2017-03-28 | End: 2017-03-28 | Stop reason: HOSPADM

## 2017-03-28 RX ORDER — MAGNESIUM OXIDE 400 MG/1
400 TABLET ORAL 2 TIMES DAILY
Qty: 14 TABLET | Refills: 0 | Status: SHIPPED | OUTPATIENT
Start: 2017-03-28 | End: 2017-04-04 | Stop reason: SDUPTHER

## 2017-03-28 RX ORDER — SODIUM CHLORIDE 9 MG/ML
250 INJECTION, SOLUTION INTRAVENOUS ONCE
Status: COMPLETED | OUTPATIENT
Start: 2017-03-28 | End: 2017-03-28

## 2017-03-28 RX ORDER — SODIUM CHLORIDE 0.9 % (FLUSH) 0.9 %
10 SYRINGE (ML) INJECTION AS NEEDED
Status: CANCELLED | OUTPATIENT
Start: 2017-04-04

## 2017-03-28 RX ADMIN — MAGNESIUM SULFATE IN DEXTROSE 1 G: 10 INJECTION, SOLUTION INTRAVENOUS at 11:40

## 2017-03-28 RX ADMIN — SODIUM CHLORIDE 250 ML: 9 INJECTION, SOLUTION INTRAVENOUS at 11:23

## 2017-03-28 RX ADMIN — DIPHENHYDRAMINE HYDROCHLORIDE 25 MG: 50 INJECTION INTRAMUSCULAR; INTRAVENOUS at 11:40

## 2017-03-28 RX ADMIN — MAGNESIUM SULFATE IN DEXTROSE 1 G: 10 INJECTION, SOLUTION INTRAVENOUS at 13:48

## 2017-03-28 RX ADMIN — HEPARIN SODIUM (PORCINE) LOCK FLUSH IV SOLN 100 UNIT/ML 500 UNITS: 100 SOLUTION at 15:15

## 2017-03-28 RX ADMIN — PACLITAXEL 140 MG: 6 INJECTION, SOLUTION INTRAVENOUS at 12:09

## 2017-03-28 RX ADMIN — DEXAMETHASONE SODIUM PHOSPHATE 12 MG: 4 INJECTION, SOLUTION INTRAMUSCULAR; INTRAVENOUS at 11:23

## 2017-03-28 RX ADMIN — FAMOTIDINE 20 MG: 10 INJECTION, SOLUTION INTRAVENOUS at 11:38

## 2017-03-28 RX ADMIN — MAGNESIUM SULFATE IN DEXTROSE 1 G: 10 INJECTION, SOLUTION INTRAVENOUS at 12:40

## 2017-03-28 RX ADMIN — Medication 10 ML: at 15:15

## 2017-03-28 NOTE — PROGRESS NOTES
SS spoke with Patricia Lawton who states that pt request gas card.  SS provided pt with gas card today.  SS will follow as needed.

## 2017-03-28 NOTE — PROGRESS NOTES
Name:  Aminta Yoo  :  1940  Date:  3/28/2017     REFERRING PHYSICIAN  Win Us MD    PRIMARY CARE PROVIDER   RICARDO Mike    REASON FOR FOLLOWUP  1. Malignant neoplasm of peritoneum      CHIEF COMPLAINT  Occasional abdominal pain, still manageable with prn Norco. Controlled leg pains on neurontin. Intermittent dizziness.    Dear Ms. Daniels,    HISTORY OF PRESENT ILLNESS:   I saw Ms. Yoo in follow up today in our medical oncology clinic. As you are aware, she is a pleasant, 77 y.o., white female with a history of a (presumably benign) brain tumor (status post resection in ~) who was in her usual state of health until late summer 2016 when she started to feel increasingly full/swollen and began to experience occasional abdominal pains/discomfort. An abdominal CT scan in late 2016 identified numerous deposits of probable tumor in the mesentery consistent with carcinomatosis, and she was referred to local surgery. Endoscopies were performed that were unremarkable and, between this and a  level of > 3000, it was felt that a gynecologic malignancy (likely ovarian or primary peritoneal) was the probable underlying diagnosis. A CT-guided biopsy of some abdominal wall soft tissue in late 2016 confirmed this. She was subsequently evaluated by gynecologic oncology in [place] (who may eventually attempt a debulking laparotomy; but, initial therapy with [id number] carbo/taxol was recommended first). The patient was agreeable and began this treatment on 2016.    INTERIM HISTORY:  Ms. Yoo returns to clinic today for followup again accompanied by her . She began chemotherapy in mid-2016 and has overall been tolerating it fairly well. She has been getting a little more progressively fatigued; and her leg aches and restlessness are still an issue (although they remain tolerable on neurontin). She was feeling extra fatigued last week and went  to the ED as a result (where she felt better following IV supplementation for a Mg level that was found to be ~ 0.8 mg/dL).    Past Medical History:   Diagnosis Date   • Arthritis    • Cancer     brain tumor   • Chronic constipation    • Chronic narcotic use     Arthritis   • Chronic renal insufficiency    • Depression    • GERD (gastroesophageal reflux disease)    • Ovarian cancer        Past Surgical History:   Procedure Laterality Date   • BACK SURGERY  2012   • BRAIN TUMOR EXCISION  2000    Cancerous. Treated with radiation after surgery.    • COLONOSCOPY N/A 9/30/2016    Procedure: COLONOSCOPY;  Surgeon: Win Us MD;  Location: [facility] OR;  Service:    • ENDOSCOPY N/A 9/30/2016    Procedure: ESOPHAGOGASTRODUODENOSCOPY;  Surgeon: Win Us MD;  Location: [facility] OR;  Service:    • PORTACATH PLACEMENT N/A 11/4/2016    Procedure: INSERTION OF PORTACATH;  Surgeon: Win Us MD;  Location: [facility] OR;  Service:    • TOTAL ABDOMINAL HYSTERECTOMY  1977    Benign disease.  One ovary remains in situ.       Social History     Social History   • Marital status:      Spouse name: N/A   • Number of children: N/A   • Years of education: N/A     Occupational History   • Not on file.     Social History Main Topics   • Smoking status: Former Smoker     Packs/day: 1.00     Years: 20.00     Quit date: 9/29/1996   • Smokeless tobacco: Never Used   • Alcohol use No   • Drug use: No   • Sexual activity: Defer     Other Topics Concern   • Not on file     Social History Narrative       Family History   Problem Relation Age of Onset   • Heart disease Mother    • Hypertension Mother    • Cancer Sister      Brain & Breast       Allergies   Allergen Reactions   • Sulfa Antibiotics Rash       Current Outpatient Prescriptions   Medication Sig Dispense Refill   • calcium acetate (PHOSLO) 667 MG capsule Take 667 mg by mouth 3 (Three) Times a Day With Meals.     • diclofenac (VOLTAREN) 75 MG EC tablet 2 (Two) Times a  Day.     • FLUoxetine (PROzac) 10 MG capsule 10 mg Every Night.  0   • FLUoxetine (PROzac) 20 MG capsule Take 20 mg by mouth Daily.     • gabapentin (NEURONTIN) 300 MG capsule Take 1 capsule by mouth 3 (Three) Times a Day. 90 capsule 5   • HYDROcodone-acetaminophen (NORCO) 7.5-325 MG per tablet Every 6 (Six) Hours As Needed.     • NEXIUM 40 MG capsule Every Morning Before Breakfast.     • ondansetron (ZOFRAN) 8 MG tablet Take 1 tablet by mouth Every 8 (Eight) Hours As Needed for nausea or vomiting. 30 tablet 5   • prochlorperazine (COMPAZINE) 10 MG tablet Take 1 tablet by mouth Every 6 (Six) Hours As Needed for nausea or vomiting. 30 tablet 5   • spironolactone (ALDACTONE) 25 MG tablet 25 mg 2 (Two) Times a Day.     • magnesium oxide (MAG-OX) 400 MG tablet Take 1 tablet by mouth 2 (Two) Times a Day for 7 days. 14 tablet 0     No current facility-administered medications for this visit.      Facility-Administered Medications Ordered in Other Visits   Medication Dose Route Frequency Provider Last Rate Last Dose   • heparin flush (porcine) 100 UNIT/ML injection  - ADS Override Pull            • heparin flush (porcine) 100 UNIT/ML injection  - ADS Override Pull            • heparin flush (porcine) 100 UNIT/ML injection 500 Units  500 Units Intravenous PRN T Jay Warner MD       • magnesium sulfate in D5W 1g/100mL (PREMIX)  1 g Intravenous Once T Jay Warner MD       • sodium chloride 0.9 % flush 10 mL  10 mL Intravenous PRN T Jay Warner MD           REVIEW OF SYSTEMS  CONSTITUTIONAL:  No fever, chills or night sweats. Progressive fatigue since starting chemotherapy, as per the HPI above.  EYES:  No blurry vision, diplopia or other vision changes.  ENT:  No hearing loss, nosebleeds or sore throat.  CARDIOVASCULAR:  No palpitations, arrhythmia, syncopal episodes or edema.  PULMONARY:  No hemoptysis, wheezing, chronic cough or shortness of breath.  GASTROINTESTINAL:  No nausea or vomiting.  Intermittent abdominal  pains and intermittent constipation.  GENITOURINARY:  No hematuria, kidney stones or frequent urination.  MUSCULOSKELETAL:  Bilateral leg pains, still manageable with neurontin, as per the HPI above.  INTEGUMENTARY: No rashes or pruritus.  ENDOCRINE:  No excessive thirst or hot flashes.  HEMATOLOGIC:  No history of free bleeding, spontaneous bleeding or clotting.  IMMUNOLOGIC:  No allergies or frequent infections.  NEUROLOGIC: No numbness, tingling, seizures or weakness. Intermittent dizziness.  PSYCHIATRIC:  No anxiety or depression.    PHYSICAL EXAMINATION    /84  Pulse 78  Temp 97.7 °F (36.5 °C) (Oral)   Resp 18  Wt 144 lb (65.3 kg)  SpO2 96%  BMI 24.72 kg/m2    GENERAL:  A well-developed, well-nourished, elderly, white female in no acute distress.  HEENT:  Pupils equally round and reactive to light.  Extraocular muscles intact. Positive alopecia, wearing a wig.  CARDIOVASCULAR:  Regular rate and rhythm. No murmurs, gallops or rubs.  LUNGS:  Clear to auscultation bilaterally.  ABDOMEN:  Soft, nondistended with positive bowel sounds.  EXTREMITIES:  No clubbing, cyanosis or edema bilaterally.  SKIN:  No rashes or petechiae. Powerport in place.  NEURO:  Cranial nerves grossly intact.  No focal deficits.  PSYCH:  Alert and oriented x3.    LABORATORY    Lab Results   Component Value Date    WBC 4.33 (L) 03/28/2017    HGB 8.9 (L) 03/28/2017    HCT 27.8 (L) 03/28/2017    .3 (H) 03/28/2017     03/28/2017    NEUTROABS 2.34 03/28/2017       Lab Results   Component Value Date     03/28/2017    K 3.8 03/28/2017     03/28/2017    CO2 23.7 (L) 03/28/2017    BUN 20 03/28/2017    CREATININE 1.26 03/28/2017    GLUCOSE 99 03/28/2017    CALCIUM 7.6 (L) 03/28/2017    AST 23 03/28/2017    ALT 22 03/28/2017    ALKPHOS 69 03/28/2017    BILITOT 0.3 03/28/2017    PROTEINTOT 6.6 03/28/2017    ALBUMIN 4.30 03/28/2017     Mg (03/21/2017): 0.8 mg/dL; Mg( 03/28/2017): 0.9 mg/dL     (03/06/2017):  58.9 U/mL   (01/31/2017): 142.1 U/mL   (12/27/2016): 370.3 U/mL   (11/18/2016): 2625.0 U/mL   (09/29/2016): 3277.0 U/mL    IMAGING  CT abdomen and pelvis with contrast (09/22/2016):  Impression: Findings most consistent with carcinomatosis of the mesentery with numerous deposits of probable tumor throughout the lower abdomen and pelvis. Pathologic sized adenopathy is also seen.    NM PET with fusion CT, skull base to mid-thigh (10/28/2016):  Impression: Abnormal PET fusion CT showing fairly intense hypermetabolic activity throughout the abnormal soft tissue densities in the mesentery. This was the site that was previously biopsied. The SUV value was 7.6 (or 17.6?).    CT abdomen and pelvis with contrast (01/30/2017):  Impression: Decrease seen in the amount of omental thickening anteriorly within the rightward aspect of the abdomen and pelvis. There is no free fluid. There is overall improvement in the disease process.    PATHOLOGY  Soft tissue, abdominal wall, needle core biopsies (10/25/2016):  Poorly differentiated carcinoma. The findings are supportive of the clinical impression of ovarian/primary peritoneal carcinoma.    IMPRESSION AND PLAN  Ms. Yoo is a 77 y.o., white female with:  1. Metastatic ovarian/primary peritoneal cancer: Initially felt to be the most likely diagnosis, given the patient's clinical presentation, imaging appearance, negative GI evaluation and extremely elevated  level (3277 U/mL on 09/29/2016); and, in late October 2016, confirmed via a CT-guided biopsy of some abdominal wall soft tissue. I have had multiple long discussions with the patient and her family regarding this diagnosis and its prognosis. They are aware that this disease is not likely to be curable; but, given her good performance status, it was (and remains) treatable. A NM PET scan performed on 10/28/2016 showed no evidence of disease outside the peritoneum (although it did further suggest  extensive disease within it). Once a powerport was placed, we began [id number] carboplatin/taxol (AUC 6 on day 1; 80 mg/m2 days 1,8,15) on 11/18/2016. She has received a little over five cycles of this regimen to date and is overall still tolerating it fairly well; however, with her steadily increasing fatigue, she has periodically required a week off from treatment. Fortunately, despite her side effects, the most recent repeat  level (~58.9 U/mL on 03/06/2017) remains substantially decreased from priors (~3000+ U/mL in late September 2016), an encouraging sign that her treatment is providing her with an ongoing, measurable benefit. We will proceed with this current treatment plan (day #8 of cycle #6 today). A few weeks after the completion of cycle #6, she will follow up with [facility] gyn/onc, as previously planned, with repeat CT scans (to consider an exploratory/debulking laparotomy). We will see her back in our clinic in six weeks with a repeat CBC and CMP.  2. Hypomagnesemia: Mg of 0.9 on recheck today. Will give a total of 3g IV today with chemotherapy. A Rx for MgOx 400 mg BID x seven days was also provided. Recheck a Mg level in one week (on day #15 of cycle #6 of chemotherapy).  3. 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 Continue neurontin 300 mg TID. Continue to monitor.  4. Abdominal pain: Secondary to issue #1. Improved with ongoing, palliative chemotherapy. Continue prn Norco. Continue to monitor.  5. Constipation: Secondary to issue #1 and prn narcotics. Currently still improved with the use of stool softeners and prn laxatives. Continue to monitor.  The patient and her  were in agreement with these plans.    It is a pleasure to participate in Ms. Yoo's care. Please do not hesitate to call with any questions or concerns that you may have.    A total of 30 minutes were spent coordinating this patient’s care in clinic today; 25 minutes of which were  face-to-face with the patient and her , reviewing her interim medical history and counseling on the current treatment and followup plan.  All questions were answered to their satisfaction.    FOLLOW UP  Proceed with day #8 of cycle #6 of [id number] carboplatin/taxol (AUC 6 on day #1/ 80 mg/m2 on days #1,8,15) today as planned. With [facility] gyn/onc in ~ 1 month (late April 2017) with a repeat  level and CT scans. Return to our clinic in 6 weeks with a CBC, CMP and  .        This document was electronically signed by ADE Warner MD March 28, 2017 1:43 PM      CC: RICARDO Patel MD

## 2017-04-04 ENCOUNTER — LAB (OUTPATIENT)
Dept: ONCOLOGY | Facility: CLINIC | Age: 77
End: 2017-04-04

## 2017-04-04 ENCOUNTER — INFUSION (OUTPATIENT)
Dept: ONCOLOGY | Facility: HOSPITAL | Age: 77
End: 2017-04-04

## 2017-04-04 VITALS
BODY MASS INDEX: 24.08 KG/M2 | HEART RATE: 88 BPM | DIASTOLIC BLOOD PRESSURE: 67 MMHG | OXYGEN SATURATION: 94 % | WEIGHT: 140.3 LBS | TEMPERATURE: 97.1 F | RESPIRATION RATE: 20 BRPM | SYSTOLIC BLOOD PRESSURE: 109 MMHG

## 2017-04-04 DIAGNOSIS — C48.2 MALIGNANT NEOPLASM OF PERITONEUM (HCC): ICD-10-CM

## 2017-04-04 DIAGNOSIS — C80.1 MALIGNANT NEOPLASTIC DISEASE (HCC): Primary | ICD-10-CM

## 2017-04-04 LAB
ALBUMIN SERPL-MCNC: 4.4 G/DL (ref 3.4–4.8)
ALBUMIN/GLOB SERPL: 1.8 G/DL (ref 1.5–2.5)
ALP SERPL-CCNC: 69 U/L (ref 35–104)
ALT SERPL W P-5'-P-CCNC: 28 U/L (ref 10–36)
ANION GAP SERPL CALCULATED.3IONS-SCNC: 10.4 MMOL/L (ref 3.6–11.2)
ANISOCYTOSIS BLD QL: NORMAL
AST SERPL-CCNC: 22 U/L (ref 10–30)
BASOPHILS # BLD AUTO: 0.03 10*3/MM3 (ref 0–0.3)
BASOPHILS NFR BLD AUTO: 0.4 % (ref 0–2)
BILIRUB SERPL-MCNC: 0.3 MG/DL (ref 0.2–1.8)
BUN BLD-MCNC: 32 MG/DL (ref 7–21)
BUN/CREAT SERPL: 21.6 (ref 7–25)
CALCIUM SPEC-SCNC: 9.1 MG/DL (ref 7.7–10)
CHLORIDE SERPL-SCNC: 109 MMOL/L (ref 99–112)
CO2 SERPL-SCNC: 21.6 MMOL/L (ref 24.3–31.9)
CREAT BLD-MCNC: 1.48 MG/DL (ref 0.43–1.29)
DEPRECATED RDW RBC AUTO: 62.4 FL (ref 37–54)
EOSINOPHIL # BLD AUTO: 0.02 10*3/MM3 (ref 0–0.7)
EOSINOPHIL NFR BLD AUTO: 0.3 % (ref 0–7)
ERYTHROCYTE [DISTWIDTH] IN BLOOD BY AUTOMATED COUNT: 16.1 % (ref 11.5–14.5)
GFR SERPL CREATININE-BSD FRML MDRD: 34 ML/MIN/1.73
GLOBULIN UR ELPH-MCNC: 2.4 GM/DL
GLUCOSE BLD-MCNC: 95 MG/DL (ref 70–110)
HCT VFR BLD AUTO: 30.7 % (ref 37–47)
HGB BLD-MCNC: 9.9 G/DL (ref 12–16)
IMM GRANULOCYTES # BLD: 0.18 10*3/MM3 (ref 0–0.03)
IMM GRANULOCYTES NFR BLD: 2.3 % (ref 0–0.5)
LYMPHOCYTES # BLD AUTO: 2.11 10*3/MM3 (ref 1–3)
LYMPHOCYTES NFR BLD AUTO: 26.7 % (ref 16–46)
MACROCYTES BLD QL SMEAR: NORMAL
MAGNESIUM SERPL-MCNC: 1.4 MG/DL (ref 1.7–2.6)
MCH RBC QN AUTO: 34.4 PG (ref 27–33)
MCHC RBC AUTO-ENTMCNC: 32.2 G/DL (ref 33–37)
MCV RBC AUTO: 106.6 FL (ref 80–94)
MONOCYTES # BLD AUTO: 0.44 10*3/MM3 (ref 0.1–0.9)
MONOCYTES NFR BLD AUTO: 5.6 % (ref 0–12)
NEUTROPHILS # BLD AUTO: 5.11 10*3/MM3 (ref 1.4–6.5)
NEUTROPHILS NFR BLD AUTO: 64.7 % (ref 40–75)
OSMOLALITY SERPL CALC.SUM OF ELEC: 288 MOSM/KG (ref 273–305)
PLAT MORPH BLD: NORMAL
PLATELET # BLD AUTO: 208 10*3/MM3 (ref 130–400)
PMV BLD AUTO: 11.2 FL (ref 6–10)
POIKILOCYTOSIS BLD QL SMEAR: NORMAL
POTASSIUM BLD-SCNC: 4.4 MMOL/L (ref 3.5–5.3)
PROT SERPL-MCNC: 6.8 G/DL (ref 6–8)
RBC # BLD AUTO: 2.88 10*6/MM3 (ref 4.2–5.4)
SODIUM BLD-SCNC: 141 MMOL/L (ref 135–153)
WBC NRBC COR # BLD: 7.89 10*3/MM3 (ref 4.5–12.5)

## 2017-04-04 PROCEDURE — 25010000002 PACLITAXEL PER 30 MG: Performed by: INTERNAL MEDICINE

## 2017-04-04 PROCEDURE — 96368 THER/DIAG CONCURRENT INF: CPT

## 2017-04-04 PROCEDURE — 96375 TX/PRO/DX INJ NEW DRUG ADDON: CPT

## 2017-04-04 PROCEDURE — 96413 CHEMO IV INFUSION 1 HR: CPT

## 2017-04-04 PROCEDURE — 25010000002 MAGNESIUM SULFATE PER 500 MG OF MAGNESIUM: Performed by: INTERNAL MEDICINE

## 2017-04-04 PROCEDURE — 25010000002 DEXAMETHASONE SODIUM PHOSPHATE 20 MG/5ML SOLUTION 5 ML VIAL: Performed by: INTERNAL MEDICINE

## 2017-04-04 PROCEDURE — 96361 HYDRATE IV INFUSION ADD-ON: CPT

## 2017-04-04 PROCEDURE — 25010000002 HEPARIN FLUSH (PORCINE) 100 UNIT/ML SOLUTION: Performed by: INTERNAL MEDICINE

## 2017-04-04 PROCEDURE — 25010000002 DIPHENHYDRAMINE PER 50 MG: Performed by: INTERNAL MEDICINE

## 2017-04-04 RX ORDER — SODIUM CHLORIDE 0.9 % (FLUSH) 0.9 %
10 SYRINGE (ML) INJECTION AS NEEDED
Status: CANCELLED | OUTPATIENT
Start: 2017-04-04

## 2017-04-04 RX ORDER — MAGNESIUM OXIDE 400 MG/1
400 TABLET ORAL 2 TIMES DAILY
Qty: 14 TABLET | Refills: 0 | Status: SHIPPED | OUTPATIENT
Start: 2017-04-04 | End: 2017-04-11

## 2017-04-04 RX ORDER — SODIUM CHLORIDE 9 MG/ML
250 INJECTION, SOLUTION INTRAVENOUS ONCE
Status: COMPLETED | OUTPATIENT
Start: 2017-04-04 | End: 2017-04-04

## 2017-04-04 RX ORDER — FAMOTIDINE 10 MG/ML
20 INJECTION, SOLUTION INTRAVENOUS ONCE
Status: COMPLETED | OUTPATIENT
Start: 2017-04-04 | End: 2017-04-04

## 2017-04-04 RX ADMIN — DEXAMETHASONE SODIUM PHOSPHATE 12 MG: 4 INJECTION, SOLUTION INTRAMUSCULAR; INTRAVENOUS at 13:42

## 2017-04-04 RX ADMIN — PACLITAXEL 140 MG: 6 INJECTION, SOLUTION INTRAVENOUS at 14:11

## 2017-04-04 RX ADMIN — SODIUM CHLORIDE 250 ML: 9 INJECTION, SOLUTION INTRAVENOUS at 13:20

## 2017-04-04 RX ADMIN — MAGNESIUM SULFATE HEPTAHYDRATE 1000 ML: 500 INJECTION, SOLUTION INTRAMUSCULAR; INTRAVENOUS at 14:08

## 2017-04-04 RX ADMIN — FAMOTIDINE 20 MG: 10 INJECTION, SOLUTION INTRAVENOUS at 13:21

## 2017-04-04 RX ADMIN — DIPHENHYDRAMINE HYDROCHLORIDE 25 MG: 50 INJECTION INTRAMUSCULAR; INTRAVENOUS at 13:26

## 2017-04-04 RX ADMIN — HEPARIN SODIUM (PORCINE) LOCK FLUSH IV SOLN 100 UNIT/ML 500 UNITS: 100 SOLUTION at 16:10

## 2017-04-05 ENCOUNTER — LAB (OUTPATIENT)
Dept: ONCOLOGY | Facility: CLINIC | Age: 77
End: 2017-04-05

## 2017-04-05 ENCOUNTER — INFUSION (OUTPATIENT)
Dept: ONCOLOGY | Facility: HOSPITAL | Age: 77
End: 2017-04-05

## 2017-04-05 VITALS
BODY MASS INDEX: 24.36 KG/M2 | WEIGHT: 141.9 LBS | OXYGEN SATURATION: 96 % | RESPIRATION RATE: 18 BRPM | DIASTOLIC BLOOD PRESSURE: 77 MMHG | HEART RATE: 86 BPM | TEMPERATURE: 97.6 F | SYSTOLIC BLOOD PRESSURE: 139 MMHG

## 2017-04-05 DIAGNOSIS — C48.2 MALIGNANT NEOPLASM OF PERITONEUM (HCC): ICD-10-CM

## 2017-04-05 DIAGNOSIS — C80.1 MALIGNANT NEOPLASTIC DISEASE (HCC): Primary | ICD-10-CM

## 2017-04-05 DIAGNOSIS — C48.2 MALIGNANT NEOPLASM OF PERITONEUM, UNSPECIFIED (HCC): Primary | ICD-10-CM

## 2017-04-05 LAB
ANION GAP SERPL CALCULATED.3IONS-SCNC: 9.4 MMOL/L (ref 3.6–11.2)
BUN BLD-MCNC: 27 MG/DL (ref 7–21)
BUN/CREAT SERPL: 23.3 (ref 7–25)
CALCIUM SPEC-SCNC: 9 MG/DL (ref 7.7–10)
CANCER AG125 SERPL-ACNC: 45.9 U/ML (ref 0–38.1)
CHLORIDE SERPL-SCNC: 110 MMOL/L (ref 99–112)
CO2 SERPL-SCNC: 21.6 MMOL/L (ref 24.3–31.9)
CREAT BLD-MCNC: 1.16 MG/DL (ref 0.43–1.29)
GFR SERPL CREATININE-BSD FRML MDRD: 45 ML/MIN/1.73
GLUCOSE BLD-MCNC: 95 MG/DL (ref 70–110)
MAGNESIUM SERPL-MCNC: 1.7 MG/DL (ref 1.7–2.6)
OSMOLALITY SERPL CALC.SUM OF ELEC: 286.2 MOSM/KG (ref 273–305)
POTASSIUM BLD-SCNC: 3.8 MMOL/L (ref 3.5–5.3)
SODIUM BLD-SCNC: 141 MMOL/L (ref 135–153)

## 2017-04-05 PROCEDURE — 25010000002 HEPARIN FLUSH (PORCINE) 100 UNIT/ML SOLUTION: Performed by: INTERNAL MEDICINE

## 2017-04-05 PROCEDURE — 96360 HYDRATION IV INFUSION INIT: CPT

## 2017-04-05 RX ORDER — SODIUM CHLORIDE 9 MG/ML
INJECTION, SOLUTION INTRAVENOUS
Status: COMPLETED
Start: 2017-04-05 | End: 2017-04-05

## 2017-04-05 RX ORDER — SODIUM CHLORIDE 9 MG/ML
1000 INJECTION, SOLUTION INTRAVENOUS ONCE
Status: COMPLETED | OUTPATIENT
Start: 2017-04-05 | End: 2017-04-05

## 2017-04-05 RX ORDER — SODIUM CHLORIDE 0.9 % (FLUSH) 0.9 %
10 SYRINGE (ML) INJECTION AS NEEDED
Status: CANCELLED | OUTPATIENT
Start: 2017-04-05

## 2017-04-05 RX ADMIN — SODIUM CHLORIDE 1000 ML: 9 INJECTION, SOLUTION INTRAVENOUS at 14:12

## 2017-04-05 RX ADMIN — HEPARIN SODIUM (PORCINE) LOCK FLUSH IV SOLN 100 UNIT/ML 500 UNITS: 100 SOLUTION at 15:14

## 2017-04-12 ENCOUNTER — LAB (OUTPATIENT)
Dept: ONCOLOGY | Facility: CLINIC | Age: 77
End: 2017-04-12

## 2017-04-12 DIAGNOSIS — C80.1 MALIGNANT NEOPLASTIC DISEASE (HCC): ICD-10-CM

## 2017-04-12 LAB
ALBUMIN SERPL-MCNC: 4.2 G/DL (ref 3.4–4.8)
ALBUMIN/GLOB SERPL: 1.6 G/DL (ref 1.5–2.5)
ALP SERPL-CCNC: 97 U/L (ref 35–104)
ALT SERPL W P-5'-P-CCNC: 93 U/L (ref 10–36)
ANION GAP SERPL CALCULATED.3IONS-SCNC: 10.5 MMOL/L (ref 3.6–11.2)
ANISOCYTOSIS BLD QL: ABNORMAL
AST SERPL-CCNC: 52 U/L (ref 10–30)
BILIRUB SERPL-MCNC: 0.3 MG/DL (ref 0.2–1.8)
BUN BLD-MCNC: 45 MG/DL (ref 7–21)
BUN/CREAT SERPL: 21.4 (ref 7–25)
CALCIUM SPEC-SCNC: 9.5 MG/DL (ref 7.7–10)
CHLORIDE SERPL-SCNC: 107 MMOL/L (ref 99–112)
CO2 SERPL-SCNC: 22.5 MMOL/L (ref 24.3–31.9)
CREAT BLD-MCNC: 2.1 MG/DL (ref 0.43–1.29)
DEPRECATED RDW RBC AUTO: 60.2 FL (ref 37–54)
ERYTHROCYTE [DISTWIDTH] IN BLOOD BY AUTOMATED COUNT: 16 % (ref 11.5–14.5)
GFR SERPL CREATININE-BSD FRML MDRD: 23 ML/MIN/1.73
GLOBULIN UR ELPH-MCNC: 2.6 GM/DL
GLUCOSE BLD-MCNC: 113 MG/DL (ref 70–110)
HCT VFR BLD AUTO: 28.3 % (ref 37–47)
HGB BLD-MCNC: 9 G/DL (ref 12–16)
HYPOCHROMIA BLD QL: ABNORMAL
LARGE PLATELETS: ABNORMAL
LYMPHOCYTES # BLD MANUAL: 2.13 10*3/MM3 (ref 1–3)
LYMPHOCYTES NFR BLD MANUAL: 20 % (ref 16–46)
LYMPHOCYTES NFR BLD MANUAL: 5 % (ref 0–12)
MACROCYTES BLD QL SMEAR: ABNORMAL
MAGNESIUM SERPL-MCNC: 1.6 MG/DL (ref 1.7–2.6)
MCH RBC QN AUTO: 33.7 PG (ref 27–33)
MCHC RBC AUTO-ENTMCNC: 31.8 G/DL (ref 33–37)
MCV RBC AUTO: 106 FL (ref 80–94)
METAMYELOCYTES NFR BLD MANUAL: 1 % (ref 0–0)
MONOCYTES # BLD AUTO: 0.53 10*3/MM3 (ref 0.1–0.9)
NEUTROPHILS # BLD AUTO: 7.87 10*3/MM3 (ref 1.4–6.5)
NEUTROPHILS NFR BLD MANUAL: 74 % (ref 40–75)
NRBC SPEC MANUAL: 2 /100 WBC (ref 0–0)
OSMOLALITY SERPL CALC.SUM OF ELEC: 291.7 MOSM/KG (ref 273–305)
PLATELET # BLD AUTO: 158 10*3/MM3 (ref 130–400)
PMV BLD AUTO: 11.4 FL (ref 6–10)
POIKILOCYTOSIS BLD QL SMEAR: ABNORMAL
POTASSIUM BLD-SCNC: 4.6 MMOL/L (ref 3.5–5.3)
PROT SERPL-MCNC: 6.8 G/DL (ref 6–8)
RBC # BLD AUTO: 2.67 10*6/MM3 (ref 4.2–5.4)
SCAN SLIDE: NORMAL
SODIUM BLD-SCNC: 140 MMOL/L (ref 135–153)
WBC NRBC COR # BLD: 10.64 10*3/MM3 (ref 4.5–12.5)

## 2017-04-12 PROCEDURE — 83735 ASSAY OF MAGNESIUM: CPT | Performed by: INTERNAL MEDICINE

## 2017-04-12 PROCEDURE — 85007 BL SMEAR W/DIFF WBC COUNT: CPT | Performed by: INTERNAL MEDICINE

## 2017-04-12 PROCEDURE — 85025 COMPLETE CBC W/AUTO DIFF WBC: CPT | Performed by: INTERNAL MEDICINE

## 2017-04-12 PROCEDURE — 80053 COMPREHEN METABOLIC PANEL: CPT | Performed by: INTERNAL MEDICINE

## 2017-04-12 PROCEDURE — 36415 COLL VENOUS BLD VENIPUNCTURE: CPT | Performed by: INTERNAL MEDICINE

## 2017-04-24 ENCOUNTER — HOSPITAL ENCOUNTER (OUTPATIENT)
Dept: CT IMAGING | Facility: HOSPITAL | Age: 77
Discharge: HOME | End: 2017-04-24
Attending: INTERNAL MEDICINE | Admitting: INTERNAL MEDICINE

## 2017-04-24 ENCOUNTER — HOSPITAL ENCOUNTER (OUTPATIENT)
Dept: CT IMAGING | Facility: HOSPITAL | Age: 77
Discharge: HOME | End: 2017-04-24
Attending: INTERNAL MEDICINE

## 2017-04-24 DIAGNOSIS — C48.2 MALIGNANT NEOPLASM OF PERITONEUM (HCC): ICD-10-CM

## 2017-04-24 PROCEDURE — 71250 CT THORAX DX C-: CPT

## 2017-04-24 PROCEDURE — 74176 CT ABD & PELVIS W/O CONTRAST: CPT | Performed by: RADIOLOGY

## 2017-04-24 PROCEDURE — 74176 CT ABD & PELVIS W/O CONTRAST: CPT

## 2017-04-24 PROCEDURE — 71250 CT THORAX DX C-: CPT | Performed by: RADIOLOGY

## 2017-05-09 DIAGNOSIS — C48.2 MALIGNANT NEOPLASM OF PERITONEUM, UNSPECIFIED (HCC): Primary | ICD-10-CM

## 2017-05-10 ENCOUNTER — OFFICE VISIT (OUTPATIENT)
Dept: GYNECOLOGIC ONCOLOGY | Facility: CLINIC | Age: 77
End: 2017-05-10

## 2017-05-10 VITALS
RESPIRATION RATE: 20 BRPM | SYSTOLIC BLOOD PRESSURE: 119 MMHG | WEIGHT: 136 LBS | HEART RATE: 83 BPM | BODY MASS INDEX: 23.34 KG/M2 | TEMPERATURE: 97.6 F | DIASTOLIC BLOOD PRESSURE: 57 MMHG | OXYGEN SATURATION: 92 %

## 2017-05-10 DIAGNOSIS — R53.83 FATIGUE DUE TO TREATMENT: ICD-10-CM

## 2017-05-10 DIAGNOSIS — G62.0 CHEMOTHERAPY-INDUCED PERIPHERAL NEUROPATHY (HCC): ICD-10-CM

## 2017-05-10 DIAGNOSIS — N18.2 CHRONIC RENAL INSUFFICIENCY, STAGE 2 (MILD): ICD-10-CM

## 2017-05-10 DIAGNOSIS — T45.1X5A ANEMIA ASSOCIATED WITH CHEMOTHERAPY: ICD-10-CM

## 2017-05-10 DIAGNOSIS — D64.81 ANEMIA ASSOCIATED WITH CHEMOTHERAPY: ICD-10-CM

## 2017-05-10 DIAGNOSIS — T45.1X5A CHEMOTHERAPY-INDUCED PERIPHERAL NEUROPATHY (HCC): ICD-10-CM

## 2017-05-10 DIAGNOSIS — C48.2 MALIGNANT NEOPLASM OF PERITONEUM (HCC): Primary | ICD-10-CM

## 2017-05-10 PROCEDURE — 99214 OFFICE O/P EST MOD 30 MIN: CPT | Performed by: OBSTETRICS & GYNECOLOGY

## 2017-05-11 PROBLEM — R53.83 FATIGUE DUE TO TREATMENT: Status: ACTIVE | Noted: 2017-05-11

## 2017-05-11 PROBLEM — G62.0 CHEMOTHERAPY-INDUCED PERIPHERAL NEUROPATHY (HCC): Status: ACTIVE | Noted: 2017-05-11

## 2017-05-11 PROBLEM — T45.1X5A ANEMIA ASSOCIATED WITH CHEMOTHERAPY: Status: ACTIVE | Noted: 2017-05-11

## 2017-05-11 PROBLEM — T45.1X5A CHEMOTHERAPY-INDUCED PERIPHERAL NEUROPATHY (HCC): Status: ACTIVE | Noted: 2017-05-11

## 2017-05-11 PROBLEM — T45.1X5A CHEMOTHERAPY-INDUCED NEUTROPENIA (HCC): Status: ACTIVE | Noted: 2017-05-11

## 2017-05-11 PROBLEM — D70.1 CHEMOTHERAPY-INDUCED NEUTROPENIA (HCC): Status: ACTIVE | Noted: 2017-05-11

## 2017-05-11 PROBLEM — D64.81 ANEMIA ASSOCIATED WITH CHEMOTHERAPY: Status: ACTIVE | Noted: 2017-05-11

## 2017-05-16 ENCOUNTER — LAB (OUTPATIENT)
Dept: ONCOLOGY | Facility: CLINIC | Age: 77
End: 2017-05-16

## 2017-05-16 ENCOUNTER — OFFICE VISIT (OUTPATIENT)
Dept: ONCOLOGY | Facility: CLINIC | Age: 77
End: 2017-05-16

## 2017-05-16 ENCOUNTER — INFUSION (OUTPATIENT)
Dept: ONCOLOGY | Facility: HOSPITAL | Age: 77
End: 2017-05-16

## 2017-05-16 VITALS
RESPIRATION RATE: 18 BRPM | OXYGEN SATURATION: 95 % | SYSTOLIC BLOOD PRESSURE: 114 MMHG | DIASTOLIC BLOOD PRESSURE: 75 MMHG | BODY MASS INDEX: 23.31 KG/M2 | OXYGEN SATURATION: 95 % | RESPIRATION RATE: 18 BRPM | DIASTOLIC BLOOD PRESSURE: 75 MMHG | WEIGHT: 135.8 LBS | TEMPERATURE: 97.8 F | HEART RATE: 91 BPM | BODY MASS INDEX: 23.31 KG/M2 | SYSTOLIC BLOOD PRESSURE: 114 MMHG | WEIGHT: 135.8 LBS | TEMPERATURE: 97.8 F | HEART RATE: 91 BPM

## 2017-05-16 DIAGNOSIS — C80.0 DISSEMINATED MALIGNANT NEOPLASM (HCC): Primary | ICD-10-CM

## 2017-05-16 DIAGNOSIS — C48.2 MALIGNANT NEOPLASM OF PERITONEUM (HCC): Primary | ICD-10-CM

## 2017-05-16 LAB
ALBUMIN SERPL-MCNC: 4 G/DL (ref 3.4–4.8)
ALBUMIN/GLOB SERPL: 1.4 G/DL (ref 1.5–2.5)
ALP SERPL-CCNC: 92 U/L (ref 35–104)
ALT SERPL W P-5'-P-CCNC: 20 U/L (ref 10–36)
ANION GAP SERPL CALCULATED.3IONS-SCNC: 9.5 MMOL/L (ref 3.6–11.2)
AST SERPL-CCNC: 21 U/L (ref 10–30)
BASOPHILS # BLD AUTO: 0.03 10*3/MM3 (ref 0–0.3)
BASOPHILS NFR BLD AUTO: 0.4 % (ref 0–2)
BILIRUB SERPL-MCNC: 0.3 MG/DL (ref 0.2–1.8)
BUN BLD-MCNC: 16 MG/DL (ref 7–21)
BUN/CREAT SERPL: 12.7 (ref 7–25)
CALCIUM SPEC-SCNC: 8.8 MG/DL (ref 7.7–10)
CHLORIDE SERPL-SCNC: 108 MMOL/L (ref 99–112)
CO2 SERPL-SCNC: 27.5 MMOL/L (ref 24.3–31.9)
CREAT BLD-MCNC: 1.26 MG/DL (ref 0.43–1.29)
DEPRECATED RDW RBC AUTO: 54.6 FL (ref 37–54)
EOSINOPHIL # BLD AUTO: 0.11 10*3/MM3 (ref 0–0.7)
EOSINOPHIL NFR BLD AUTO: 1.4 % (ref 0–7)
ERYTHROCYTE [DISTWIDTH] IN BLOOD BY AUTOMATED COUNT: 15.4 % (ref 11.5–14.5)
GFR SERPL CREATININE-BSD FRML MDRD: 41 ML/MIN/1.73
GLOBULIN UR ELPH-MCNC: 2.9 GM/DL
GLUCOSE BLD-MCNC: 94 MG/DL (ref 70–110)
HCT VFR BLD AUTO: 35.4 % (ref 37–47)
HGB BLD-MCNC: 10.8 G/DL (ref 12–16)
IMM GRANULOCYTES # BLD: 0.09 10*3/MM3 (ref 0–0.03)
IMM GRANULOCYTES NFR BLD: 1.2 % (ref 0–0.5)
LYMPHOCYTES # BLD AUTO: 1.62 10*3/MM3 (ref 1–3)
LYMPHOCYTES NFR BLD AUTO: 21 % (ref 16–46)
MCH RBC QN AUTO: 30.8 PG (ref 27–33)
MCHC RBC AUTO-ENTMCNC: 30.5 G/DL (ref 33–37)
MCV RBC AUTO: 100.9 FL (ref 80–94)
MONOCYTES # BLD AUTO: 0.76 10*3/MM3 (ref 0.1–0.9)
MONOCYTES NFR BLD AUTO: 9.9 % (ref 0–12)
NEUTROPHILS # BLD AUTO: 5.1 10*3/MM3 (ref 1.4–6.5)
NEUTROPHILS NFR BLD AUTO: 66.1 % (ref 40–75)
OSMOLALITY SERPL CALC.SUM OF ELEC: 289.6 MOSM/KG (ref 273–305)
PLATELET # BLD AUTO: 279 10*3/MM3 (ref 130–400)
PMV BLD AUTO: 9.6 FL (ref 6–10)
POTASSIUM BLD-SCNC: 3.9 MMOL/L (ref 3.5–5.3)
PROT SERPL-MCNC: 6.9 G/DL (ref 6–8)
RBC # BLD AUTO: 3.51 10*6/MM3 (ref 4.2–5.4)
SODIUM BLD-SCNC: 145 MMOL/L (ref 135–153)
WBC NRBC COR # BLD: 7.71 10*3/MM3 (ref 4.5–12.5)

## 2017-05-16 PROCEDURE — 36591 DRAW BLOOD OFF VENOUS DEVICE: CPT

## 2017-05-16 PROCEDURE — 25010000002 HEPARIN FLUSH (PORCINE) 100 UNIT/ML SOLUTION: Performed by: INTERNAL MEDICINE

## 2017-05-16 PROCEDURE — 86304 IMMUNOASSAY TUMOR CA 125: CPT | Performed by: INTERNAL MEDICINE

## 2017-05-16 PROCEDURE — 99214 OFFICE O/P EST MOD 30 MIN: CPT | Performed by: INTERNAL MEDICINE

## 2017-05-16 RX ORDER — SODIUM CHLORIDE 0.9 % (FLUSH) 0.9 %
10 SYRINGE (ML) INJECTION AS NEEDED
Status: CANCELLED | OUTPATIENT
Start: 2017-06-16

## 2017-05-16 RX ORDER — SODIUM CHLORIDE 0.9 % (FLUSH) 0.9 %
10 SYRINGE (ML) INJECTION AS NEEDED
Status: DISCONTINUED | OUTPATIENT
Start: 2017-05-16 | End: 2017-05-16 | Stop reason: HOSPADM

## 2017-05-16 RX ADMIN — HEPARIN SODIUM (PORCINE) LOCK FLUSH IV SOLN 100 UNIT/ML 500 UNITS: 100 SOLUTION at 11:45

## 2017-05-16 RX ADMIN — Medication 10 ML: at 11:45

## 2017-05-17 ENCOUNTER — APPOINTMENT (OUTPATIENT)
Dept: ONCOLOGY | Facility: HOSPITAL | Age: 77
End: 2017-05-17

## 2017-05-17 LAB — CANCER AG125 SERPL-ACNC: 41 U/ML (ref 0–38.1)

## 2017-06-16 ENCOUNTER — INFUSION (OUTPATIENT)
Dept: ONCOLOGY | Facility: HOSPITAL | Age: 77
End: 2017-06-16

## 2017-06-16 VITALS
OXYGEN SATURATION: 90 % | RESPIRATION RATE: 18 BRPM | WEIGHT: 141.8 LBS | TEMPERATURE: 98.5 F | SYSTOLIC BLOOD PRESSURE: 110 MMHG | HEART RATE: 86 BPM | DIASTOLIC BLOOD PRESSURE: 75 MMHG | BODY MASS INDEX: 24.34 KG/M2

## 2017-06-16 DIAGNOSIS — C48.2 MALIGNANT NEOPLASM OF PERITONEUM (HCC): Primary | ICD-10-CM

## 2017-06-16 PROCEDURE — 96523 IRRIG DRUG DELIVERY DEVICE: CPT | Performed by: NURSE PRACTITIONER

## 2017-06-16 PROCEDURE — 25010000002 HEPARIN FLUSH (PORCINE) 100 UNIT/ML SOLUTION: Performed by: INTERNAL MEDICINE

## 2017-06-16 RX ORDER — SODIUM CHLORIDE 0.9 % (FLUSH) 0.9 %
10 SYRINGE (ML) INJECTION AS NEEDED
Status: DISCONTINUED | OUTPATIENT
Start: 2017-06-16 | End: 2017-06-16 | Stop reason: HOSPADM

## 2017-06-16 RX ORDER — SODIUM CHLORIDE 0.9 % (FLUSH) 0.9 %
10 SYRINGE (ML) INJECTION AS NEEDED
Status: CANCELLED | OUTPATIENT
Start: 2017-07-18

## 2017-06-16 RX ADMIN — SODIUM CHLORIDE, PRESERVATIVE FREE 500 UNITS: 5 INJECTION INTRAVENOUS at 13:15

## 2017-06-16 RX ADMIN — Medication 10 ML: at 13:15

## 2017-06-30 ENCOUNTER — HOSPITAL ENCOUNTER (OUTPATIENT)
Dept: CT IMAGING | Facility: HOSPITAL | Age: 77
Discharge: HOME | End: 2017-06-30
Attending: INTERNAL MEDICINE | Admitting: INTERNAL MEDICINE

## 2017-06-30 DIAGNOSIS — C48.2 MALIGNANT NEOPLASM OF PERITONEUM (HCC): ICD-10-CM

## 2017-06-30 PROCEDURE — 74176 CT ABD & PELVIS W/O CONTRAST: CPT

## 2017-06-30 PROCEDURE — 74176 CT ABD & PELVIS W/O CONTRAST: CPT | Performed by: RADIOLOGY

## 2017-07-14 ENCOUNTER — OFFICE VISIT (OUTPATIENT)
Dept: GYNECOLOGIC ONCOLOGY | Facility: CLINIC | Age: 77
End: 2017-07-14

## 2017-07-14 VITALS
HEART RATE: 66 BPM | WEIGHT: 145 LBS | RESPIRATION RATE: 18 BRPM | DIASTOLIC BLOOD PRESSURE: 81 MMHG | TEMPERATURE: 97 F | BODY MASS INDEX: 24.89 KG/M2 | OXYGEN SATURATION: 95 % | SYSTOLIC BLOOD PRESSURE: 124 MMHG

## 2017-07-14 DIAGNOSIS — R53.83 FATIGUE DUE TO TREATMENT: ICD-10-CM

## 2017-07-14 DIAGNOSIS — T45.1X5A CHEMOTHERAPY-INDUCED PERIPHERAL NEUROPATHY (HCC): ICD-10-CM

## 2017-07-14 DIAGNOSIS — G62.0 CHEMOTHERAPY-INDUCED PERIPHERAL NEUROPATHY (HCC): ICD-10-CM

## 2017-07-14 DIAGNOSIS — R97.1 ELEVATED CA-125: ICD-10-CM

## 2017-07-14 DIAGNOSIS — C48.2 MALIGNANT NEOPLASM OF PERITONEUM (HCC): Primary | ICD-10-CM

## 2017-07-14 DIAGNOSIS — C80.0 CARCINOMATOSIS (HCC): ICD-10-CM

## 2017-07-14 DIAGNOSIS — K59.09 CHRONIC CONSTIPATION: ICD-10-CM

## 2017-07-14 PROCEDURE — 99214 OFFICE O/P EST MOD 30 MIN: CPT | Performed by: OBSTETRICS & GYNECOLOGY

## 2017-07-14 RX ORDER — FLUOXETINE 10 MG/1
10 TABLET, FILM COATED ORAL DAILY
COMMUNITY

## 2017-07-14 NOTE — PROGRESS NOTES
Subjective     Reason for visit:  Surveillance of primary peritoneal cancer    History of present illness:  The patient is a 77 y.o. female with clinical stage 3C primary peritoneal cancer.  Her treatment history is outlined below.  She was treated with a neoadjuvant approach and received chemotherapy with Dr. Warner of Hematology Oncology at [facility].  She had an excellent response to therapy and as of April 2017 began a treatment holiday.  She presents today for surveillance.    Since her last visit the patient and her family continue to report she is doing better.  Her appetite is good and she has regained some weight.  Denies abdominal pain or bloating.  Denies nausea or vomiting.  No vaginal bleeding or discharge.  Denies fevers or chills.  Denies chest pain or shortness of breath.  She has better energy.  She continues to use a walker however at times in her home is able to ambulate without it.  She continues to have grade 2-3 neuropathy worse in her feet versus hands.  Normal bowel and bladder function.  At least daily bowel movements.  No issues with her skin.  No issues with her Port-A-Cath.    Treatment History:    1.  She presented with carcinomatosis and an elevated CA-125 to greater than 3000.  She had a negative colonoscopy and EGD with Dr. Us of General Surgery.  She was then referred to Dr. Warner of Hematology Oncology at [facility] and was referred to Gynecologic Oncology.  2.  Imaging on presentation showed bulky disease in the root of the mesentery and numerous miliary deposits.  The tumor plaque at the vaginal cuff and anteriorly in the vagina felt as though it would require significant local dissection in order to resect.  Optimal upfront debulking surgery was felt not to be possible or ideal in this elderly patient and recommendation was made for a neoadjuvant approach.  3.  Soft tissue biopsy showed poorly differentiated carcinoma, favor adenocarcinoma  4.  She received her first  cycle of treatment on 11/18/16.  Following C3 she had responded by CA-125, exam, and imaging but a slightly declining performance status and therefore decision was made to complete six cycles of therapy.  5.  As of 4/4/17 she received C6D15 of dose dense Carbo/Taxol.  Treatment was complicated by a number of toxicities including anemia, weight loss, fatigue, and grade 2-3 neuropathy.    6.  Post treatment re-imaging in April 2017 showed dramatic improvement in her peritoneal disease.   at the end of therapy was 41.  She had no cancer-related symptoms and decision was made to enter a treatment holiday.      OBGYN History: NSVDx2.  She underwent a hysterectomy in the 70s for benign disease.  She believes one ovary remains in situ.    Past Medical History:   Diagnosis Date   • Arthritis    • Cancer     brain tumor   • Chronic constipation    • Chronic narcotic use     Arthritis   • Chronic renal insufficiency    • Depression    • GERD (gastroesophageal reflux disease)    • Ovarian cancer      Past Surgical History:   Procedure Laterality Date   • BACK SURGERY  2012   • BRAIN TUMOR EXCISION  2000    Cancerous. Treated with radiation after surgery.    • COLONOSCOPY N/A 9/30/2016    Procedure: COLONOSCOPY;  Surgeon: Win Us MD;  Location: [facility];  Service:    • ENDOSCOPY N/A 9/30/2016    Procedure: ESOPHAGOGASTRODUODENOSCOPY;  Surgeon: Win Us MD;  Location: [facility] OR;  Service:    • PORTACATH PLACEMENT N/A 11/4/2016    Procedure: INSERTION OF PORTACATH;  Surgeon: Win Us MD;  Location: [facility] OR;  Service:    • TOTAL ABDOMINAL HYSTERECTOMY  1977    Benign disease.  One ovary remains in situ.     MEDICATIONS: The current medication list was reviewed with the patient and updated in the EMR this date per the Medical Assistant. Medication dosages and frequencies were confirmed to be accurate.      Allergies:  is allergic to sulfa antibiotics.    Social History:  She is  and retired.   She lives 2.5 hours away.  They live 45 minutes from [facility].  She denies current use of tobacco, alcohol, and illicit drugs.  She is a former 20-pack-year smoker.     Family History:  Her sister suffered from breast cancer in her 50s.  Denies a history of colon, endometrial, ovarian, and prostate cancer.  No history of melanoma.    Health Maintenance:    1. Mammogram - More than 5 years ago  2. Colonoscopy - 2016    Review of Systems:  CONSTITUTIONAL:  Weight is stabilizing, +G1-2 fatigue.  No fever, chills, night sweats.  PSYCHIATRIC: No history of anxiety.  +depression. No bipolar disorder or insomnia.  EYES: Vision is unchanged.  No photophobia.  EARS, NOSE, MOUTH, AND THROAT: Hearing normal. No swallowing difficulties.  No sore throat.  ENDOCRINE: No history of diabetes or thyroid disease.  LYMPHATIC: No enlarged lymph nodes  RESPIRATORY: No shortness of breath, cough, asthma or wheezing.  No hemoptysis.  CARDIOVASCULAR: No angina, orthopnea, or edema.  No HTN.  No hyperlipidemia.  GASTROINTESTINAL: No constipation.  No diarrhea, or melena.  +reflux.  No current nausea, vomiting.  GENITOURINARY: No dysuria, hematuria, urgency, or frequency.  NEUROLOGIC: +G2 weakness.  +G2-3 neuropathy.  No syncope, seizure, or headaches.  MUSCULOSKELETAL: +joint pain.  +G2 muscle weakness.  INTEGUMENTARY: No new skin lesions.  GYNECOLOGIC: Per history of present illness.  HEMATOLOGIC: No bleeding problems.  Denies easy bruising.    Objective      Physical Exam  Vitals:    07/14/17 1135   BP: 124/81   Pulse: 66   Resp: 18   Temp: 97 °F (36.1 °C)   SpO2: 95%     Body mass index is 24.89 kg/(m^2).    Wt Readings from Last 3 Encounters:   07/14/17 145 lb (65.8 kg)   06/16/17 141 lb 12.8 oz (64.3 kg)   05/16/17 135 lb 12.8 oz (61.6 kg)   10/19/16 weight was 155 when I first met her    ECOG PS 2.  She needed less assistance in clinic today and was able to dress/un-dress for the pelvic exam.     GENERAL: Alert, well-appearing  elderly female in no apparent distress.  She appears her stated age.  She is here with her son and .  HEENT: Sclera anicteric, normal eyelids. Head normocephalic, atraumatic. Mucus membranes moist.  Normal dentition.    NECK: Trachea midline, supple, without masses.  No thyromegaly.  She has some bony asymmetric right clavicular fullness that she tells me is chronic and stable.     CHEST: PAC right anterior chest c/d/i not accessed  CARDIOVASCULAR: Normal rate, regular rhythm, no murmurs, rubs, or gallops.  Extremities symmetric.  No peripheral edema.  RESPIRATORY: Clear to auscultation bilaterally, normal respiratory effort  GASTROINTESTINAL:  Soft, non-tender, non distended (has always had some mild distention due to elderly habitus), no rebound or guarding, or hernias.  No masses.  No HSM.  Incisions - well healed infra-umbilical right paramedian incision.  MUSCULOSKELETAL:  Normal gait and station.  FROMx4.  No digital cyanosis.    SKIN:  Warm, dry, well-perfused.  All visible areas intact.  No rashes, lesions, ulcers.  PSYCHIATRIC: AO x3, with appropriate affect, normal thought processes  LYMPHATICS:  No cervical or inguinal adenopathy noted.  PELVIC exam:  Normal external female genitalia without lesions or skin changes.  Urethra midline.  She has a visible grade 2 rectocele.  Vagina without lesions.  Cuff intact, well-healed, and without visible lesions.  On bimanual exam the prior plaque just proximal to the vaginal cuff is still gone.  No palpable abdominal masses.  Rectovaginal exam confirmatory. +soft stool in the vault. The pelvic sidewalls are smooth, the cul de sac is clear, the rectovaginal septum is supple.    Diagnostic Data:    Lab Results   Component Value Date    WBC 7.71 05/16/2017    HGB 10.8 (L) 05/16/2017    HCT 35.4 (L) 05/16/2017    .9 (H) 05/16/2017     05/16/2017    NEUTROABS 5.10 05/16/2017    GLUCOSE 94 05/16/2017    BUN 16 05/16/2017    CREATININE 1.26 05/16/2017     EGFRIFNONA 41 (L) 05/16/2017     05/16/2017    K 3.9 05/16/2017     05/16/2017    CO2 27.5 05/16/2017    MG 1.6 (L) 04/12/2017    CALCIUM 8.8 05/16/2017    ALBUMIN 4.00 05/16/2017    AST 21 05/16/2017    ALT 20 05/16/2017    BILITOT 0.3 05/16/2017     41.0 (H) 05/16/2017     Lab Results   Component Value Date     41.0 (H) 05/16/2017     45.9 (H) 04/04/2017     58.9 (H) 03/06/2017     98.3 (H) 02/13/2017     CT Abdomen, Pelvis 6/30/17  Findings  LOWER THORAX: Clear. No effusions.  ABDOMEN:  LIVER: Homogeneous. No focal hepatic mass or ductal dilatation.  GALLBLADDER: No dilation or stone identified.  PANCREAS: Unremarkable. No mass or ductal dilatation.  SPLEEN: Homogeneous. No splenomegaly.  ADRENALS: No mass.  KIDNEYS: RIGHT RENAL CYST MEASURING 3.6 CM.  GI TRACT: ABUNDANT STOOL THROUGHOUT THE COLON.  PERITONEUM: No free air. No free fluid or loculated fluid  collections.  MESENTERY: Unremarkable.  LYMPH NODES: No lymphadenopathy.  VASCULATURE: ATHEROSCLEROTIC VASCULAR CALCIFICATION.  ABDOMINAL WALL: No focal hernia or mass.  OTHER: None.  PELVIS:  BLADDER: No focal mass or significant wall thickening  REPRODUCTIVE: Unremarkable as visualized.  APPENDIX: THE APPENDIX IS NOT VISUALIZED.  BONES: No acute bony abnormality.  IMPRESSION:  Impression:  Stable appearance of the abdomen.    I pulled up her CT images and personally reviewed them.  I concur that there is a large stool burden.  There is no ascites.  There are no pelvic masses.  There is no lymphadenopathy.  There remains a small amount of likely burnt out peritoneal disease in the superior lateral omentum.      Assessment/Plan  This is a 77 y.o. woman with clinical stage 3C primary peritoneal cancer, now s/p neoadjuvant chemotherapy, now on treatment holiday, here for surveillance visit     1.  Cancer and treatment toxicities:    -She continues to recover from therapy.  There are neuropathy is still grade 2-3 both  sensory and motor.  At least she is not in a wheelchair today.  She is using a walker.  Fatigue is improving.  -We reviewed the results from her CT in 6/30/17.  There were no findings.  I do not advise further surveillance imaging unless she develops symptoms.  She has no evidence of disease by exam.  She has no cancer-related symptoms.  Same with CA-125.  I would not trend this unless she develops symptoms.  -Continue treatment holiday.    -They continue to understand that overall, remission/stable disease will not be long lasting, her cancer is incurable, and she will likely need additional lines of chemotherapy to palliate symptoms.    2.  Mild renal insufficiency: Returned to baseline    3.  Follow up:   -She sees Dr. Warner this week  -RTC to see me in 4 months          Electronically Signed by: Jailene Tran MD  Date: 7/14/2017     Time:  2:39 PM

## 2017-07-18 ENCOUNTER — OFFICE VISIT (OUTPATIENT)
Dept: ONCOLOGY | Facility: CLINIC | Age: 77
End: 2017-07-18

## 2017-07-18 ENCOUNTER — INFUSION (OUTPATIENT)
Dept: ONCOLOGY | Facility: HOSPITAL | Age: 77
End: 2017-07-18

## 2017-07-18 VITALS
TEMPERATURE: 98 F | DIASTOLIC BLOOD PRESSURE: 79 MMHG | OXYGEN SATURATION: 96 % | HEART RATE: 68 BPM | SYSTOLIC BLOOD PRESSURE: 143 MMHG | RESPIRATION RATE: 18 BRPM

## 2017-07-18 VITALS
RESPIRATION RATE: 18 BRPM | DIASTOLIC BLOOD PRESSURE: 79 MMHG | SYSTOLIC BLOOD PRESSURE: 143 MMHG | TEMPERATURE: 98 F | HEART RATE: 68 BPM | OXYGEN SATURATION: 96 %

## 2017-07-18 DIAGNOSIS — C48.2 MALIGNANT NEOPLASM OF PERITONEUM (HCC): Primary | ICD-10-CM

## 2017-07-18 LAB
ALBUMIN SERPL-MCNC: 4.4 G/DL (ref 3.4–4.8)
ALBUMIN/GLOB SERPL: 1.7 G/DL (ref 1.5–2.5)
ALP SERPL-CCNC: 95 U/L (ref 35–104)
ALT SERPL W P-5'-P-CCNC: 27 U/L (ref 10–36)
ANION GAP SERPL CALCULATED.3IONS-SCNC: 6.6 MMOL/L (ref 3.6–11.2)
AST SERPL-CCNC: 25 U/L (ref 10–30)
BASOPHILS # BLD AUTO: 0.02 10*3/MM3 (ref 0–0.3)
BASOPHILS NFR BLD AUTO: 0.3 % (ref 0–2)
BILIRUB SERPL-MCNC: 0.3 MG/DL (ref 0.2–1.8)
BUN BLD-MCNC: 24 MG/DL (ref 7–21)
BUN/CREAT SERPL: 20.7 (ref 7–25)
CALCIUM SPEC-SCNC: 9.2 MG/DL (ref 7.7–10)
CHLORIDE SERPL-SCNC: 109 MMOL/L (ref 99–112)
CO2 SERPL-SCNC: 26.4 MMOL/L (ref 24.3–31.9)
CREAT BLD-MCNC: 1.16 MG/DL (ref 0.43–1.29)
DEPRECATED RDW RBC AUTO: 47.1 FL (ref 37–54)
EOSINOPHIL # BLD AUTO: 0.1 10*3/MM3 (ref 0–0.7)
EOSINOPHIL NFR BLD AUTO: 1.4 % (ref 0–7)
ERYTHROCYTE [DISTWIDTH] IN BLOOD BY AUTOMATED COUNT: 14.2 % (ref 11.5–14.5)
GFR SERPL CREATININE-BSD FRML MDRD: 45 ML/MIN/1.73
GLOBULIN UR ELPH-MCNC: 2.6 GM/DL
GLUCOSE BLD-MCNC: 98 MG/DL (ref 70–110)
HCT VFR BLD AUTO: 40.1 % (ref 37–47)
HGB BLD-MCNC: 12.6 G/DL (ref 12–16)
IMM GRANULOCYTES # BLD: 0.03 10*3/MM3 (ref 0–0.03)
IMM GRANULOCYTES NFR BLD: 0.4 % (ref 0–0.5)
LYMPHOCYTES # BLD AUTO: 1.93 10*3/MM3 (ref 1–3)
LYMPHOCYTES NFR BLD AUTO: 27.7 % (ref 16–46)
MCH RBC QN AUTO: 28.5 PG (ref 27–33)
MCHC RBC AUTO-ENTMCNC: 31.4 G/DL (ref 33–37)
MCV RBC AUTO: 90.7 FL (ref 80–94)
MONOCYTES # BLD AUTO: 0.38 10*3/MM3 (ref 0.1–0.9)
MONOCYTES NFR BLD AUTO: 5.4 % (ref 0–12)
NEUTROPHILS # BLD AUTO: 4.52 10*3/MM3 (ref 1.4–6.5)
NEUTROPHILS NFR BLD AUTO: 64.8 % (ref 40–75)
OSMOLALITY SERPL CALC.SUM OF ELEC: 287.1 MOSM/KG (ref 273–305)
PLATELET # BLD AUTO: 214 10*3/MM3 (ref 130–400)
PMV BLD AUTO: 10.7 FL (ref 6–10)
POTASSIUM BLD-SCNC: 4.5 MMOL/L (ref 3.5–5.3)
PROT SERPL-MCNC: 7 G/DL (ref 6–8)
RBC # BLD AUTO: 4.42 10*6/MM3 (ref 4.2–5.4)
SODIUM BLD-SCNC: 142 MMOL/L (ref 135–153)
WBC NRBC COR # BLD: 6.98 10*3/MM3 (ref 4.5–12.5)

## 2017-07-18 PROCEDURE — 85025 COMPLETE CBC W/AUTO DIFF WBC: CPT

## 2017-07-18 PROCEDURE — 86304 IMMUNOASSAY TUMOR CA 125: CPT

## 2017-07-18 PROCEDURE — 99214 OFFICE O/P EST MOD 30 MIN: CPT | Performed by: INTERNAL MEDICINE

## 2017-07-18 PROCEDURE — 36591 DRAW BLOOD OFF VENOUS DEVICE: CPT

## 2017-07-18 PROCEDURE — 80053 COMPREHEN METABOLIC PANEL: CPT

## 2017-07-18 PROCEDURE — 25010000002 HEPARIN FLUSH (PORCINE) 100 UNIT/ML SOLUTION: Performed by: INTERNAL MEDICINE

## 2017-07-18 PROCEDURE — 36415 COLL VENOUS BLD VENIPUNCTURE: CPT

## 2017-07-18 RX ORDER — SODIUM CHLORIDE 0.9 % (FLUSH) 0.9 %
10 SYRINGE (ML) INJECTION AS NEEDED
Status: DISCONTINUED | OUTPATIENT
Start: 2017-07-18 | End: 2017-07-18 | Stop reason: HOSPADM

## 2017-07-18 RX ORDER — SODIUM CHLORIDE 0.9 % (FLUSH) 0.9 %
10 SYRINGE (ML) INJECTION AS NEEDED
Status: CANCELLED | OUTPATIENT
Start: 2017-01-01

## 2017-07-18 RX ADMIN — Medication 10 ML: at 11:10

## 2017-07-18 RX ADMIN — HEPARIN 500 UNITS: 100 SYRINGE at 11:10

## 2017-07-18 NOTE — PROGRESS NOTES
Name:  Aminta Yoo  :  1940  Date:  2017     REFERRING PHYSICIAN  Win Us MD    PRIMARY CARE PROVIDER   RICARDO Mike    REASON FOR FOLLOWUP  1. Malignant neoplasm of peritoneum      CHIEF COMPLAINT  Controlled leg/feet and fingertip nerve pains/tingling on neurontin and prn Norco.    Dear Ms. Daniels,    HISTORY OF PRESENT ILLNESS:   I saw Ms. Yoo in follow up today in our medical oncology clinic. As you are aware, she is a pleasant, 77 y.o., white female with a history of a (presumably benign) brain tumor (status post resection in ~) who was in her usual state of health until late summer 2016 when she started to feel increasingly full/swollen and began to experience occasional abdominal pains/discomfort. An abdominal CT scan in late 2016 identified numerous deposits of probable tumor in the mesentery consistent with carcinomatosis, and she was referred to local surgery. Endoscopies were performed that were unremarkable and, between this and a  level of > 3000, it was felt that a gynecologic malignancy (likely ovarian or primary peritoneal) was the probable underlying diagnosis. A CT-guided biopsy of some abdominal wall soft tissue in late 2016 confirmed this. She was subsequently evaluated by gynecologic oncology in [place] (who recommended initial therapy with [id number] carbo/taxol). The patient was agreeable and began this treatment on 2016.    INTERIM HISTORY:  Ms. Yoo returns to clinic today for followup again accompanied by her son. She began chemotherapy in mid-2016, received a total of ~six, [id number] cycles between  and early 2017 and overall tolerated it fairly well. With this treatment, she became progressively fatigued and developed leg/feet/hand aches and tingling; but she also attained a very good response in her disease. In the ~three months since completing chemotherapy, her neuropathies, fatigue and  other symptoms have all been slowly, but steadily, improving (and the neuropathies remain manageable with neurontin and prn Norco).    Past Medical History:   Diagnosis Date   • Arthritis    • Cancer     brain tumor   • Chronic constipation    • Chronic narcotic use     Arthritis   • Chronic renal insufficiency    • Depression    • GERD (gastroesophageal reflux disease)    • Ovarian cancer        Past Surgical History:   Procedure Laterality Date   • BACK SURGERY  2012   • BRAIN TUMOR EXCISION  2000    Cancerous. Treated with radiation after surgery.    • COLONOSCOPY N/A 9/30/2016    Procedure: COLONOSCOPY;  Surgeon: Win Us MD;  Location: [facility] OR;  Service:    • ENDOSCOPY N/A 9/30/2016    Procedure: ESOPHAGOGASTRODUODENOSCOPY;  Surgeon: Win Us MD;  Location: [facility] OR;  Service:    • PORTACATH PLACEMENT N/A 11/4/2016    Procedure: INSERTION OF PORTACATH;  Surgeon: Win Us MD;  Location: [facility] OR;  Service:    • TOTAL ABDOMINAL HYSTERECTOMY  1977    Benign disease.  One ovary remains in situ.       Social History     Social History   • Marital status:      Spouse name: N/A   • Number of children: N/A   • Years of education: N/A     Occupational History   • Not on file.     Social History Main Topics   • Smoking status: Former Smoker     Packs/day: 1.00     Years: 20.00     Quit date: 9/29/1996   • Smokeless tobacco: Never Used   • Alcohol use No   • Drug use: No   • Sexual activity: Defer     Other Topics Concern   • Not on file     Social History Narrative       Family History   Problem Relation Age of Onset   • Heart disease Mother    • Hypertension Mother    • Cancer Sister      Brain & Breast       Allergies   Allergen Reactions   • Sulfa Antibiotics Rash       Current Outpatient Prescriptions   Medication Sig Dispense Refill   • calcium acetate (PHOSLO) 667 MG capsule Take 667 mg by mouth 3 (Three) Times a Day With Meals.     • diclofenac (VOLTAREN) 75 MG EC tablet 2 (Two)  Times a Day.     • FLUoxetine (PROzac) 10 MG capsule 10 mg Every Night.  0   • FLUoxetine (PROzac) 20 MG capsule Take 20 mg by mouth Daily.     • gabapentin (NEURONTIN) 300 MG capsule Take 1 capsule by mouth 3 (Three) Times a Day. 90 capsule 5   • HYDROcodone-acetaminophen (NORCO) 7.5-325 MG per tablet Every 6 (Six) Hours As Needed.     • NEXIUM 40 MG capsule Every Morning Before Breakfast.     • ondansetron (ZOFRAN) 8 MG tablet Take 1 tablet by mouth Every 8 (Eight) Hours As Needed for nausea or vomiting. 30 tablet 5   • prochlorperazine (COMPAZINE) 10 MG tablet Take 1 tablet by mouth Every 6 (Six) Hours As Needed for nausea or vomiting. 30 tablet 5   • spironolactone (ALDACTONE) 25 MG tablet 25 mg 2 (Two) Times a Day.       No current facility-administered medications for this visit.      Facility-Administered Medications Ordered in Other Visits   Medication Dose Route Frequency Provider Last Rate Last Dose   • heparin flush (porcine) 100 UNIT/ML injection  - ADS Override Pull            • heparin flush (porcine) 100 UNIT/ML injection 500 Units  500 Units Intravenous PRN T Jay Warner MD   500 Units at 07/18/17 1110   • sodium chloride 0.9 % flush 10 mL  10 mL Intravenous PRN T Jay Warner MD   10 mL at 07/18/17 1110       REVIEW OF SYSTEMS  CONSTITUTIONAL:  No fever, chills or night sweats. Progressive fatigue since starting chemotherapy, currently improving, as per the HPI above.  EYES:  No blurry vision, diplopia or other vision changes.  ENT:  No hearing loss, nosebleeds or sore throat.  CARDIOVASCULAR:  No palpitations, arrhythmia, syncopal episodes or edema.  PULMONARY:  No hemoptysis, wheezing, chronic cough or shortness of breath.  GASTROINTESTINAL:  No nausea or vomiting.  Intermittent abdominal pains and intermittent constipation, now improved.  GENITOURINARY:  No hematuria, kidney stones or frequent urination.  MUSCULOSKELETAL:  Bilateral leg pains, improving and still manageable with neurontin,  as per the HPI above.  INTEGUMENTARY: No rashes or pruritus.  ENDOCRINE:  No excessive thirst or hot flashes.  HEMATOLOGIC:  No history of free bleeding, spontaneous bleeding or clotting.  IMMUNOLOGIC:  No allergies or frequent infections.  NEUROLOGIC: No seizures or weakness. Intermittent dizziness, improving. Hand/leg/feet numbness/tingling/pains, improving and still controlled on neurontin, as per the HPI above.  PSYCHIATRIC:  No anxiety or depression.    PHYSICAL EXAMINATION    /79  Pulse 68  Temp 98 °F (36.7 °C) (Oral)   Resp 18  SpO2 96%    GENERAL:  A well-developed, well-nourished, elderly, white female in no acute distress, using a walker to assist with ambulation.  HEENT:  Pupils equally round and reactive to light.  Extraocular muscles intact. Improving alopecia.  CARDIOVASCULAR:  Regular rate and rhythm. No murmurs, gallops or rubs.  LUNGS:  Clear to auscultation bilaterally.  ABDOMEN:  Soft, nondistended with positive bowel sounds.  EXTREMITIES:  No clubbing, cyanosis or edema bilaterally.  SKIN:  No rashes or petechiae. Powerport in place.  NEURO:  Cranial nerves grossly intact.  No focal deficits.  PSYCH:  Alert and oriented x3.    LABORATORY    Lab Results   Component Value Date    WBC 6.98 07/18/2017    HGB 12.6 07/18/2017    HCT 40.1 07/18/2017    MCV 90.7 07/18/2017     07/18/2017    NEUTROABS 4.52 07/18/2017       Lab Results   Component Value Date     05/16/2017    K 3.9 05/16/2017     05/16/2017    CO2 27.5 05/16/2017    BUN 16 05/16/2017    CREATININE 1.26 05/16/2017    GLUCOSE 94 05/16/2017    CALCIUM 8.8 05/16/2017    AST 21 05/16/2017    ALT 20 05/16/2017    ALKPHOS 92 05/16/2017    BILITOT 0.3 05/16/2017    PROTEINTOT 6.9 05/16/2017    ALBUMIN 4.00 05/16/2017     Mg (03/21/2017): 0.8 mg/dL; Mg( 03/28/2017): 0.9 mg/dL     (07/18/2017): pending   (05/16/2017): 41.0 U/mL   (04/04/2017): 45.9 U/mL   (03/06/2017): 58.9 U/mL    (01/31/2017): 142.1 U/mL   (12/27/2016): 370.3 U/mL   (11/18/2016): 2625.0 U/mL   (09/29/2016): 3277.0 U/mL    IMAGING  CT abdomen and pelvis with contrast (09/22/2016):  Impression: Findings most consistent with carcinomatosis of the mesentery with numerous deposits of probable tumor throughout the lower abdomen and pelvis. Pathologic sized adenopathy is also seen.    NM PET with fusion CT, skull base to mid-thigh (10/28/2016):  Impression: Abnormal PET fusion CT showing fairly intense hypermetabolic activity throughout the abnormal soft tissue densities in the mesentery. This was the site that was previously biopsied. The SUV value was 7.6 (or 17.6?).    CT abdomen and pelvis with contrast (01/30/2017):  Impression: Decrease seen in the amount of omental thickening anteriorly within the rightward aspect of the abdomen and pelvis. There is no free fluid. There is overall improvement in the disease process.    CT chest without contrast (04/24/2017):  Impression: Right lung base peribronchial vascular nodularity suggestive of bronchopneumonia.    CT abdomen and pelvis without contrast (04/24/2017):  Impression: Unremarkable exam. No source for the patient's symptoms.    CT abdomen and pelvis with contrast (06/30/2017):  Impression: Stable appearance of the abdomen.    PATHOLOGY  Soft tissue, abdominal wall, needle core biopsies (10/25/2016):  Poorly differentiated carcinoma. The findings are supportive of the clinical impression of ovarian/primary peritoneal carcinoma.    IMPRESSION AND PLAN  Ms. Yoo is a 77 y.o., white female with:  1. Metastatic ovarian/primary peritoneal cancer: Initially felt to be the most likely diagnosis, given the patient's clinical presentation, imaging appearance, negative GI evaluation and extremely elevated  level (3277 U/mL on 09/29/2016); and, in late October 2016, confirmed via a CT-guided biopsy of some abdominal wall soft tissue. I have had multiple long  discussions with the patient and her family regarding this diagnosis and its prognosis. They are aware that this disease is not curable; but, given her good performance status, it was (and remains) treatable. A NM PET scan performed on 10/28/2016 showed no evidence of disease outside the peritoneum (although it did further suggest extensive disease within it). Once a powerport was placed, we began [id number] carboplatin/taxol (AUC 6 on day 1; 80 mg/m2 days 1,8,15) on 11/18/2016. She received a total of six (6) cycles of this regimen between then and early April 2017 and overall tolerated it fairly well; however, steadily increasing fatigue and extremity neuropathies became an issue by the last couple of cycles. With this treatment, the most recent repeat  level (~41.0 U/mL on 05/16/2017) remains substantially decreased from priors (~3000+ U/mL in late September 2016), and her most recent repeat imaging (contrasted CT of the abdomen/pelvis on 06/30/2017, summarized above) is consistent with much improved disease. Gynecologic oncology reevaluated her in late spring 2017 and ultimately felt that a laparotomy at this time would not impart enough additional benefit to justify its risks. I therefore agree with their recommendation for a continued holiday from active therapy with ongoing, periodic monitoring for signs of disease reprogression (primarily through routine clinic visits for symptom checks). If/when her disease does reprogress, a number of second-line, palliative options would be available to us. The patient wishes to keep her powerport in place for now (and knows that she will need to return to our clinic on a q4-6weekly basis for routine flushes). We will see her back in our clinic in four months. She will also follow up with [facility] g[facility]/onc again around that time.  2. Lower extremity neuropathies: At least partially due to recent, repeated taxol therapy. Will hopefully continue to improve now that she  remains on break from active treatment. Continue neurontin TID (300 mg in the morning and afternoon, 600 mg at night). Continue to monitor.  3. Pain: Primarily due to issue #2, but she continues to have some intermittent (though much less frequent) abdominal pains as well. Continue prn Norco. Continue to monitor.  4. Constipation: Secondary to issue #1 and prn narcotics. Currently still improved with the use of stool softeners and prn laxatives. Continue to monitor.  The patient and her  were in agreement with these plans.    It is a pleasure to participate in Ms. Yoo's care. Please do not hesitate to call with any questions or concerns that you may have.    A total of 30 minutes were spent coordinating this patient’s care in clinic today; 25 minutes of which were face-to-face with the patient and her , reviewing her interim medical history, discussing the results of the recent repeat labwork and CT scan and counseling on the current treatment and followup plan.  All questions were answered to their satisfaction.    FOLLOW UP  No further palliative chemotherapy for now. Continue neurontin TID and prn [facility]. With [facility] gyn/onc in mid-November 2017. Return to our clinic in four months (around the time of the gyn/onc appointment).        This document was electronically signed by AED Warner MD July 18, 2017 11:59 AM      CC: RICARDO Patel MD

## 2017-07-19 LAB — CANCER AG125 SERPL-ACNC: 50.6 U/ML (ref 0–38.1)

## 2017-11-03 NOTE — PROGRESS NOTES
Name:  Aminta Yoo  :  1940  Date:  11/3/2017     REFERRING PHYSICIAN  Win Us MD    PRIMARY CARE PROVIDER   RICARDO Mike    REASON FOR FOLLOWUP  1. Malignant neoplasm of peritoneum      CHIEF COMPLAINT  Persistent leg/feet and fingertip nerve pains/tingling on neurontin and prn Norco.    Dear Ms. Daniels,    HISTORY OF PRESENT ILLNESS:   I saw Ms. Yoo in follow up today in our medical oncology clinic. As you are aware, she is a pleasant, 77 y.o., white female with a history of a (presumably benign) brain tumor (status post resection in ~) who was in her usual state of health until late summer 2016 when she started to feel increasingly full/swollen and began to experience occasional abdominal pains/discomfort. An abdominal CT scan in late 2016 identified numerous deposits of probable tumor in the mesentery consistent with carcinomatosis, and she was referred to local surgery. Endoscopies were performed that were unremarkable and, between this and a  level of > 3000, it was felt that a gynecologic malignancy (likely ovarian or primary peritoneal) was the probable underlying diagnosis. A CT-guided biopsy of some abdominal wall soft tissue in late 2016 confirmed this. She was subsequently evaluated by gynecologic oncology in [place] (who recommended initial therapy with [id number] carbo/taxol). The patient was agreeable and began this treatment on 2016.    INTERIM HISTORY:  Ms. Yoo returns to clinic today for followup again accompanied by her . She began chemotherapy in mid-2016, received a total of ~six, [id number] cycles between  and early 2017 and overall tolerated it fairly well. With this treatment, she became progressively fatigued and developed leg/feet/hand aches and tingling; but she also attained a very good response in her disease. In the ~six months plus since completing chemotherapy, her fatigue, nausea and  some other toxicities have all steadily resolved and the neuropathies remain fairly manageable with neurontin and prn Norco.    Past Medical History:   Diagnosis Date   • Arthritis    • Cancer     brain tumor   • Chronic constipation    • Chronic narcotic use     Arthritis   • Chronic renal insufficiency    • Depression    • GERD (gastroesophageal reflux disease)    • Ovarian cancer        Past Surgical History:   Procedure Laterality Date   • BACK SURGERY  2012   • BRAIN TUMOR EXCISION  2000    Cancerous. Treated with radiation after surgery.    • COLONOSCOPY N/A 9/30/2016    Procedure: COLONOSCOPY;  Surgeon: Win Us MD;  Location: [facility] OR;  Service:    • ENDOSCOPY N/A 9/30/2016    Procedure: ESOPHAGOGASTRODUODENOSCOPY;  Surgeon: Win Us MD;  Location: [facility] OR;  Service:    • PORTACATH PLACEMENT N/A 11/4/2016    Procedure: INSERTION OF PORTACATH;  Surgeon: Win Us MD;  Location: [facility] OR;  Service:    • TOTAL ABDOMINAL HYSTERECTOMY  1977    Benign disease.  One ovary remains in situ.       Social History     Social History   • Marital status:      Spouse name: N/A   • Number of children: N/A   • Years of education: N/A     Occupational History   • Not on file.     Social History Main Topics   • Smoking status: Former Smoker     Packs/day: 1.00     Years: 20.00     Quit date: 9/29/1996   • Smokeless tobacco: Never Used   • Alcohol use No   • Drug use: No   • Sexual activity: Defer     Other Topics Concern   • Not on file     Social History Narrative       Family History   Problem Relation Age of Onset   • Heart disease Mother    • Hypertension Mother    • Cancer Sister      Brain & Breast       Allergies   Allergen Reactions   • Sulfa Antibiotics Rash       Current Outpatient Prescriptions   Medication Sig Dispense Refill   • calcium acetate (PHOSLO) 667 MG capsule Take 667 mg by mouth 3 (Three) Times a Day With Meals.     • diclofenac (VOLTAREN) 75 MG EC tablet 2 (Two) Times a  Day.     • FLUoxetine (PROzac) 10 MG capsule 10 mg Every Night.  0   • FLUoxetine (PROzac) 20 MG capsule Take 20 mg by mouth Daily.     • gabapentin (NEURONTIN) 300 MG capsule Take 1 capsule by mouth 3 (Three) Times a Day. 90 capsule 5   • HYDROcodone-acetaminophen (NORCO) 7.5-325 MG per tablet Every 6 (Six) Hours As Needed.     • NEXIUM 40 MG capsule Every Morning Before Breakfast.     • ondansetron (ZOFRAN) 8 MG tablet Take 1 tablet by mouth Every 8 (Eight) Hours As Needed for nausea or vomiting. 30 tablet 5   • prochlorperazine (COMPAZINE) 10 MG tablet Take 1 tablet by mouth Every 6 (Six) Hours As Needed for nausea or vomiting. 30 tablet 5   • spironolactone (ALDACTONE) 25 MG tablet 25 mg 2 (Two) Times a Day.       No current facility-administered medications for this visit.      Facility-Administered Medications Ordered in Other Visits   Medication Dose Route Frequency Provider Last Rate Last Dose   • heparin flush (porcine) 100 UNIT/ML injection  - ADS Override Pull                REVIEW OF SYSTEMS  CONSTITUTIONAL:  No fever, chills or night sweats. Progressive fatigue after starting chemotherapy, currently improved, as per the HPI above.  EYES:  No blurry vision, diplopia or other vision changes.  ENT:  No hearing loss, nosebleeds or sore throat.  CARDIOVASCULAR:  No palpitations, arrhythmia, syncopal episodes or edema.  PULMONARY:  No hemoptysis, wheezing, chronic cough or shortness of breath.  GASTROINTESTINAL:  No nausea or vomiting.  Intermittent abdominal pains and intermittent constipation, now improved.  GENITOURINARY:  No hematuria, kidney stones or frequent urination.  MUSCULOSKELETAL:  Bilateral leg pains, improving and still fairly manageable with neurontin, as per the HPI above.  INTEGUMENTARY: No rashes or pruritus.  ENDOCRINE:  No excessive thirst or hot flashes.  HEMATOLOGIC:  No history of free bleeding, spontaneous bleeding or clotting.  IMMUNOLOGIC:  No allergies or frequent  infections.  NEUROLOGIC: No seizures or weakness. Intermittent dizziness, improved. Persistent hand/leg/feet numbness/tingling/pains on neurontin, as per the HPI above.  PSYCHIATRIC:  No anxiety or depression.    PHYSICAL EXAMINATION    /73  Pulse 77  Temp 97.7 °F (36.5 °C) (Oral)   Resp 18  Wt 153 lb 14.4 oz (69.8 kg)  SpO2 94%  BMI 26.42 kg/m2    GENERAL:  A well-developed, well-nourished, elderly, white female in no acute distress, using a walker to assist with ambulation.  HEENT:  Pupils equally round and reactive to light.  Extraocular muscles intact. Improving alopecia.  CARDIOVASCULAR:  Regular rate and rhythm. No murmurs, gallops or rubs.  LUNGS:  Clear to auscultation bilaterally.  ABDOMEN:  Soft, nondistended with positive bowel sounds.  EXTREMITIES:  No clubbing, cyanosis or edema bilaterally.  SKIN:  No rashes or petechiae. Powerport in place.  NEURO:  Cranial nerves grossly intact.  No focal deficits.  PSYCH:  Alert and oriented x3.    LABORATORY    Lab Results   Component Value Date    WBC 6.98 07/18/2017    HGB 12.6 07/18/2017    HCT 40.1 07/18/2017    MCV 90.7 07/18/2017     07/18/2017    NEUTROABS 4.52 07/18/2017       Lab Results   Component Value Date     07/18/2017    K 4.5 07/18/2017     07/18/2017    CO2 26.4 07/18/2017    BUN 24 (H) 07/18/2017    CREATININE 1.16 07/18/2017    GLUCOSE 98 07/18/2017    CALCIUM 9.2 07/18/2017    AST 25 07/18/2017    ALT 27 07/18/2017    ALKPHOS 95 07/18/2017    BILITOT 0.3 07/18/2017    PROTEINTOT 7.0 07/18/2017    ALBUMIN 4.40 07/18/2017     Mg (03/21/2017): 0.8 mg/dL; Mg( 03/28/2017): 0.9 mg/dL     (07/18/2017): 50.6 U/mL   (05/16/2017): 41.0 U/mL   (04/04/2017): 45.9 U/mL   (03/06/2017): 58.9 U/mL   (01/31/2017): 142.1 U/mL   (12/27/2016): 370.3 U/mL   (11/18/2016): 2625.0 U/mL   (09/29/2016): 3277.0 U/mL    IMAGING  CT abdomen and pelvis with contrast (09/22/2016):  Impression:  Findings most consistent with carcinomatosis of the mesentery with numerous deposits of probable tumor throughout the lower abdomen and pelvis. Pathologic sized adenopathy is also seen.    NM PET with fusion CT, skull base to mid-thigh (10/28/2016):  Impression: Abnormal PET fusion CT showing fairly intense hypermetabolic activity throughout the abnormal soft tissue densities in the mesentery. This was the site that was previously biopsied. The SUV value was 7.6 (or 17.6?).    CT abdomen and pelvis with contrast (01/30/2017):  Impression: Decrease seen in the amount of omental thickening anteriorly within the rightward aspect of the abdomen and pelvis. There is no free fluid. There is overall improvement in the disease process.    CT chest without contrast (04/24/2017):  Impression: Right lung base peribronchial vascular nodularity suggestive of bronchopneumonia.    CT abdomen and pelvis without contrast (04/24/2017):  Impression: Unremarkable exam. No source for the patient's symptoms.    CT abdomen and pelvis with contrast (06/30/2017):  Impression: Stable appearance of the abdomen.    PATHOLOGY  Soft tissue, abdominal wall, needle core biopsies (10/25/2016):  Poorly differentiated carcinoma. The findings are supportive of the clinical impression of ovarian/primary peritoneal carcinoma.    IMPRESSION AND PLAN  Ms. Yoo is a 77 y.o., white female with:  1. Metastatic ovarian/primary peritoneal cancer: Initially felt to be the most likely diagnosis, given the patient's clinical presentation, imaging appearance, negative GI evaluation and extremely elevated  level (3277 U/mL on 09/29/2016); and, in late October 2016, confirmed via a CT-guided biopsy of some abdominal wall soft tissue. I have had multiple long discussions with the patient and her family regarding this diagnosis and its prognosis. They are aware that this disease is not curable; but, given her good performance status, it was (and remains)  treatable. A NM PET scan performed on 10/28/2016 showed no evidence of disease outside the peritoneum (although it did further suggest extensive disease within it). Once a powerport was placed, we began [id number] carboplatin/taxol (AUC 6 on day 1; 80 mg/m2 days 1,8,15) on 11/18/2016. She received a total of six (6) cycles of this regimen between then and early April 2017 and overall tolerated it fairly well; however, steadily increasing fatigue and extremity neuropathies became an issue by the last couple of cycles. With this treatment, the most recent repeat  level (~50.6 U/mL on 07/18/2017) remains substantially decreased from priors (~3000+ U/mL in late September 2016), and her most recent repeat imaging (contrasted CT of the abdomen/pelvis on 06/30/2017, summarized above) is consistent with much improved disease. Gynecologic oncology reevaluated her in late spring 2017 and ultimately felt that a laparotomy at this time would not impart enough additional benefit to justify its risks. I therefore agree with their recommendation for a continued holiday from active therapy with ongoing, periodic monitoring for signs of disease reprogression (primarily through routine clinic visits for symptom checks). If/when her disease does reprogress, a number of second-line, palliative options would be available to us. The patient still wishes to keep her powerport in place for now (and knows that she will need to return to our clinic on a q4-6weekly basis for routine flushes). We will see her back in our clinic in three months for another symptom check. She will also follow up with [facility]/onc later this month, as previously planned.  2. Lower extremity neuropathies: At least partially due to repeated taxol therapy. Will hopefully continue to improve now that she remains on break from active treatment. In the meantime, we will increase her dose of neurontin from 600 mg TID to 900 mg TID. Continue to  monitor.  3. Pain: Primarily due to issue #2, but she continues to have some intermittent (though much less frequent) abdominal pains as well. Continue prn Norco. Continue to monitor.  4. Constipation: Secondary to issue #1 and prn narcotics. Currently still improved with the use of stool softeners and prn laxatives. Continue to monitor.  The patient and her  were in agreement with these plans.    It is a pleasure to participate in Ms. Yoo's care. Please do not hesitate to call with any questions or concerns that you may have.    A total of 30 minutes were spent coordinating this patient’s care in clinic today; 25 minutes of which were face-to-face with the patient and her , reviewing her interim medical history, discussing the results and counseling on the current treatment and followup plan. All questions were answered to their satisfaction.    FOLLOW UP  Continue to hold any further palliative chemotherapy for now. Continue neurontin TID (at an increased dose of 900 mg TID) and prn Dafter. With [facility]/onc in mid-November 2017, as previously planned. Return to our clinic in three months (early February 2018).        This document was electronically signed by ADE Warner MD November 3, 2017 10:11 AM      CC: RICARDO Patel MD

## 2017-11-15 PROBLEM — D70.1 CHEMOTHERAPY-INDUCED NEUTROPENIA (HCC): Status: RESOLVED | Noted: 2017-05-11 | Resolved: 2017-01-01

## 2017-11-15 PROBLEM — T45.1X5A ANEMIA ASSOCIATED WITH CHEMOTHERAPY: Status: RESOLVED | Noted: 2017-05-11 | Resolved: 2017-01-01

## 2017-11-15 PROBLEM — D64.81 ANEMIA ASSOCIATED WITH CHEMOTHERAPY: Status: RESOLVED | Noted: 2017-05-11 | Resolved: 2017-01-01

## 2017-11-15 PROBLEM — T45.1X5A CHEMOTHERAPY-INDUCED NEUTROPENIA (HCC): Status: RESOLVED | Noted: 2017-05-11 | Resolved: 2017-01-01

## 2017-11-15 NOTE — PROGRESS NOTES
Aminta Yoo  [id number]  1940  Subjective     Reason for visit:  Surveillance of primary peritoneal cancer    History of present illness:  The patient is a 77 y.o. female with clinical stage 3C primary peritoneal cancer.  Her treatment history is outlined below.  She was treated with a neoadjuvant approach and received chemotherapy with Dr. Warner of Hematology Oncology at [facility].  She had an excellent response to therapy and as of April 2017 began a treatment holiday.  She presents today for another surveillance visit.    Since her last visit the patient and her  report she continues to do well.  The peripheral neuropathy has improved and at times she is ambulating without the assistance of a walker.  Energy is good.  Her appetite is good.  Denies abdominal pain or bloating.  Denies nausea or vomiting.  No vaginal bleeding or discharge.  Denies fevers or chills.  Denies chest pain or shortness of breath.   Normal bowel and bladder function.  At least daily bowel movements.  No issues with her skin.  No issues with her Port-A-Cath.    She reports she was recently treated for a flulike illness and this is been going on for more than a week.  She has rhinorrhea and a cough.  She feels she is improving.    Treatment History:    1.  She presented with carcinomatosis and an elevated CA-125 to greater than 3000.  She had a negative colonoscopy and EGD with Dr. Us of General Surgery.  She was then referred to Dr. Warner of Hematology Oncology at [facility] and was referred to Gynecologic Oncology.  2.  Imaging on presentation showed bulky disease in the root of the mesentery and numerous miliary deposits.  The tumor plaque at the vaginal cuff and anteriorly in the vagina felt as though it would require significant local dissection in order to resect.  Optimal upfront debulking surgery was felt not to be possible or ideal in this elderly patient and recommendation was made for a neoadjuvant  approach.  3.  Soft tissue biopsy showed poorly differentiated carcinoma, favor adenocarcinoma  4.  She received her first cycle of treatment on 11/18/16.  Following C3 she had responded by CA-125, exam, and imaging but a slightly declining performance status and therefore decision was made to complete six cycles of therapy.  5.  As of 4/4/17 she received C6D15 of dose dense Carbo/Taxol.  Treatment was complicated by a number of toxicities including anemia, weight loss, fatigue, and grade 2-3 neuropathy.    6.  Post treatment re-imaging in April 2017 showed dramatic improvement in her peritoneal disease.   at the end of therapy was 39.  She had no cancer-related symptoms and decision was made to enter a treatment holiday.      OBGYN History: NSVDx2.  She underwent a hysterectomy in the 70s for benign disease.  She believes one ovary remains in situ.    Past Medical History:   Diagnosis Date   • Arthritis    • Cancer     brain tumor   • Chronic constipation    • Chronic narcotic use     Arthritis   • Chronic renal insufficiency    • Depression    • GERD (gastroesophageal reflux disease)    • Ovarian cancer      Past Surgical History:   Procedure Laterality Date   • BACK SURGERY  2012   • BRAIN TUMOR EXCISION  2000    Cancerous. Treated with radiation after surgery.    • COLONOSCOPY N/A 9/30/2016    Procedure: COLONOSCOPY;  Surgeon: Win Us MD;  Location: [facility] OR;  Service:    • ENDOSCOPY N/A 9/30/2016    Procedure: ESOPHAGOGASTRODUODENOSCOPY;  Surgeon: Win Us MD;  Location: [facility] OR;  Service:    • PORTACATH PLACEMENT N/A 11/4/2016    Procedure: INSERTION OF PORTACATH;  Surgeon: Win Us MD;  Location: [facility] OR;  Service:    • TOTAL ABDOMINAL HYSTERECTOMY  1977    Benign disease.  One ovary remains in situ.     MEDICATIONS: The current medication list was reviewed with the patient and updated in the EMR this date per the Medical Assistant. Medication dosages and frequencies were  confirmed to be accurate.      Allergies:  is allergic to sulfa antibiotics.    Social History:  She is  and retired.  She lives 2.5 hours away.  They live 45 minutes from [facility].  She denies current use of tobacco, alcohol, and illicit drugs.  She is a former 20-pack-year smoker.     Family History:  Her sister suffered from breast cancer in her 50s.  Denies a history of colon, endometrial, ovarian, and prostate cancer.  No history of melanoma.    Health Maintenance:    1. Mammogram - More than 5 years ago  2. Colonoscopy - 2016    Review of Systems:  CONSTITUTIONAL:  Weight is stabilizing, no severe fatigue.  No fever, chills, night sweats.  PSYCHIATRIC: No history of anxiety.  +depression. No bipolar disorder or insomnia.  EYES: Vision is unchanged.  No photophobia.  EARS, NOSE, MOUTH, AND THROAT: Hearing normal. No swallowing difficulties.  No sore throat.  ENDOCRINE: No history of diabetes or thyroid disease.  LYMPHATIC: No enlarged lymph nodes  RESPIRATORY: No shortness of breath, cough, asthma or wheezing.  No hemoptysis.  CARDIOVASCULAR: No angina, orthopnea, or edema.  No HTN.  No hyperlipidemia.  GASTROINTESTINAL: No constipation.  No diarrhea, or melena.  +reflux.  No current nausea, vomiting.  GENITOURINARY: No dysuria, hematuria, urgency, or frequency.  NEUROLOGIC: No weakness.  +G2 neuropathy.  No syncope, seizure, or headaches.  MUSCULOSKELETAL: +joint pain.  No muscle weakness.  INTEGUMENTARY: No new skin lesions.  GYNECOLOGIC: Per history of present illness.  HEMATOLOGIC: No bleeding problems.  Denies easy bruising.    Objective      Physical Exam  Vitals:    11/15/17 1355   BP: 134/82   Pulse: 73   Resp: 20   Temp: 97 °F (36.1 °C)   SpO2: 92%     Body mass index is 25.4 kg/(m^2).    Wt Readings from Last 3 Encounters:   11/15/17 148 lb (67.1 kg)   11/03/17 153 lb 14.4 oz (69.8 kg)   08/31/17 147 lb 14.4 oz (67.1 kg)   10/19/16 weight was 155 when I first met her    ECOG PS 1.  She  was able to dress/un-dress interdependently for the pelvic exam.     GENERAL: Alert, well-appearing elderly female in no apparent distress.  She appears her stated age.  She is here with her .  HEENT: Sclera anicteric, normal eyelids. Head normocephalic, atraumatic. Mucus membranes moist.  Normal dentition.    NECK: Trachea midline, supple, without masses.  No thyromegaly.  She has some bony asymmetric right clavicular fullness that she tells me is chronic and stable.     CHEST: PAC right anterior chest c/d/i not accessed  CARDIOVASCULAR: Normal rate, regular rhythm, no murmurs, rubs, or gallops.  Extremities symmetric.  No peripheral edema.  RESPIRATORY: Clear to auscultation bilaterally, normal respiratory effort  GASTROINTESTINAL:  Soft, non-tender, non distended (has always had some mild distention due to elderly habitus), no rebound or guarding, or hernias.  No masses.  No HSM.  Incisions - well healed infra-umbilical right paramedian incision.  MUSCULOSKELETAL:  Normal gait and station.  FROMx4.  No digital cyanosis.    SKIN:  Warm, dry, well-perfused.  All visible areas intact.  No rashes, lesions, ulcers.  PSYCHIATRIC: AO x3, with appropriate affect, normal thought processes  LYMPHATICS:  No cervical or inguinal adenopathy noted.  PELVIC exam:  Normal external female genitalia without lesions or skin changes.  Urethra midline.  She has a visible grade 2 rectocele.  Vagina without lesions.  Cuff intact, well-healed, and without visible lesions.  On bimanual exam the prior plaque just proximal to the vaginal cuff is still gone.  No palpable abdominal masses.  Rectovaginal exam confirmatory. +soft stool in the vault. The pelvic sidewalls are smooth, the cul de sac is clear, the rectovaginal septum is supple.    Diagnostic Data:    Lab Results   Component Value Date     50.6 (H) 07/18/2017     41.0 (H) 05/16/2017     45.9 (H) 04/04/2017     58.9 (H) 03/06/2017     Last imaging was: CT  Abdomen, Pelvis 6/30/17      Assessment/Plan  This is a 77 y.o. woman with clinical stage 3C primary peritoneal cancer, now s/p neoadjuvant chemotherapy, now on treatment holiday, here for surveillance visit     1.  Cancer   -She has no evidence of disease by exam.  She has no cancer-related symptoms.    -Continue treatment holiday.    -They continue to understand that overall, remission/stable disease will not be long lasting, her cancer is incurable, and she will likely need additional lines of chemotherapy at some point in the future to palliate symptoms.  -Last imaging was in June 2017 and there were no findings.  I do not advise further surveillance imaging unless she develops symptoms.  Same with CA-125.  I would not obtain this unless she develops symptoms.  -Continue port a cath given that she is at very high risk for recurrence.  Could consider removal if she is disease free by imaging and  for more than a year.  It does not bother her.    2.  Prior treatment toxicities:    -Neuropathy is improving    3.  Follow up:   -RTC to in 3 months to see APRN        Electronically Signed by: Jailene Tran MD  Date: 11/15/2017     Time:  3:46 PM

## 2018-01-01 ENCOUNTER — LAB (OUTPATIENT)
Dept: ONCOLOGY | Facility: CLINIC | Age: 78
End: 2018-01-01

## 2018-01-01 ENCOUNTER — APPOINTMENT (OUTPATIENT)
Dept: GENERAL RADIOLOGY | Facility: HOSPITAL | Age: 78
End: 2018-01-01

## 2018-01-01 ENCOUNTER — HOSPITAL ENCOUNTER (EMERGENCY)
Facility: HOSPITAL | Age: 78
Discharge: LEFT BEFORE DISCHARGE | End: 2018-07-28
Attending: INTERNAL MEDICINE | Admitting: INTERNAL MEDICINE

## 2018-01-01 ENCOUNTER — OFFICE VISIT (OUTPATIENT)
Dept: GYNECOLOGIC ONCOLOGY | Facility: CLINIC | Age: 78
End: 2018-01-01

## 2018-01-01 ENCOUNTER — HOSPITAL ENCOUNTER (INPATIENT)
Facility: HOSPITAL | Age: 78
LOS: 7 days | End: 2018-08-06
Attending: EMERGENCY MEDICINE | Admitting: SURGERY

## 2018-01-01 ENCOUNTER — APPOINTMENT (OUTPATIENT)
Dept: CT IMAGING | Facility: HOSPITAL | Age: 78
End: 2018-01-01

## 2018-01-01 ENCOUNTER — INFUSION (OUTPATIENT)
Dept: ONCOLOGY | Facility: HOSPITAL | Age: 78
End: 2018-01-01

## 2018-01-01 ENCOUNTER — OFFICE VISIT (OUTPATIENT)
Dept: ONCOLOGY | Facility: CLINIC | Age: 78
End: 2018-01-01

## 2018-01-01 ENCOUNTER — ANESTHESIA (OUTPATIENT)
Dept: PERIOP | Facility: HOSPITAL | Age: 78
End: 2018-01-01

## 2018-01-01 ENCOUNTER — ANESTHESIA EVENT (OUTPATIENT)
Dept: PERIOP | Facility: HOSPITAL | Age: 78
End: 2018-01-01

## 2018-01-01 VITALS
SYSTOLIC BLOOD PRESSURE: 175 MMHG | OXYGEN SATURATION: 93 % | TEMPERATURE: 97.7 F | OXYGEN SATURATION: 93 % | RESPIRATION RATE: 20 BRPM | TEMPERATURE: 97.7 F | WEIGHT: 157.4 LBS | SYSTOLIC BLOOD PRESSURE: 175 MMHG | BODY MASS INDEX: 27.02 KG/M2 | HEART RATE: 70 BPM | BODY MASS INDEX: 27.02 KG/M2 | DIASTOLIC BLOOD PRESSURE: 81 MMHG | RESPIRATION RATE: 20 BRPM | HEART RATE: 70 BPM | WEIGHT: 157.4 LBS | DIASTOLIC BLOOD PRESSURE: 81 MMHG

## 2018-01-01 VITALS
TEMPERATURE: 97 F | RESPIRATION RATE: 20 BRPM | DIASTOLIC BLOOD PRESSURE: 67 MMHG | SYSTOLIC BLOOD PRESSURE: 148 MMHG | OXYGEN SATURATION: 92 % | WEIGHT: 157 LBS | BODY MASS INDEX: 26.95 KG/M2 | HEART RATE: 77 BPM

## 2018-01-01 VITALS
HEART RATE: 64 BPM | TEMPERATURE: 97.9 F | BODY MASS INDEX: 27.79 KG/M2 | DIASTOLIC BLOOD PRESSURE: 68 MMHG | WEIGHT: 161.9 LBS | SYSTOLIC BLOOD PRESSURE: 127 MMHG | OXYGEN SATURATION: 96 % | RESPIRATION RATE: 18 BRPM

## 2018-01-01 VITALS
BODY MASS INDEX: 25.61 KG/M2 | RESPIRATION RATE: 16 BRPM | HEIGHT: 64 IN | HEART RATE: 88 BPM | OXYGEN SATURATION: 96 % | DIASTOLIC BLOOD PRESSURE: 73 MMHG | WEIGHT: 150 LBS | TEMPERATURE: 98 F | SYSTOLIC BLOOD PRESSURE: 120 MMHG

## 2018-01-01 VITALS
RESPIRATION RATE: 20 BRPM | WEIGHT: 160.1 LBS | RESPIRATION RATE: 20 BRPM | HEART RATE: 71 BPM | DIASTOLIC BLOOD PRESSURE: 73 MMHG | HEART RATE: 71 BPM | TEMPERATURE: 98.1 F | BODY MASS INDEX: 27.48 KG/M2 | WEIGHT: 160.1 LBS | OXYGEN SATURATION: 93 % | TEMPERATURE: 98.1 F | DIASTOLIC BLOOD PRESSURE: 73 MMHG | BODY MASS INDEX: 27.48 KG/M2 | OXYGEN SATURATION: 93 % | SYSTOLIC BLOOD PRESSURE: 115 MMHG | SYSTOLIC BLOOD PRESSURE: 115 MMHG

## 2018-01-01 VITALS
BODY MASS INDEX: 25.75 KG/M2 | OXYGEN SATURATION: 92 % | RESPIRATION RATE: 16 BRPM | WEIGHT: 150 LBS | DIASTOLIC BLOOD PRESSURE: 80 MMHG | TEMPERATURE: 97.3 F | SYSTOLIC BLOOD PRESSURE: 131 MMHG | HEART RATE: 68 BPM

## 2018-01-01 VITALS
TEMPERATURE: 98.2 F | DIASTOLIC BLOOD PRESSURE: 89 MMHG | OXYGEN SATURATION: 94 % | HEART RATE: 64 BPM | SYSTOLIC BLOOD PRESSURE: 162 MMHG | BODY MASS INDEX: 26.95 KG/M2 | RESPIRATION RATE: 20 BRPM | WEIGHT: 157 LBS

## 2018-01-01 VITALS
WEIGHT: 186.2 LBS | DIASTOLIC BLOOD PRESSURE: 48 MMHG | HEIGHT: 69 IN | RESPIRATION RATE: 18 BRPM | SYSTOLIC BLOOD PRESSURE: 84 MMHG | BODY MASS INDEX: 27.58 KG/M2 | TEMPERATURE: 102.1 F | HEART RATE: 120 BPM | OXYGEN SATURATION: 87 %

## 2018-01-01 VITALS
BODY MASS INDEX: 27.12 KG/M2 | WEIGHT: 158 LBS | OXYGEN SATURATION: 93 % | DIASTOLIC BLOOD PRESSURE: 62 MMHG | TEMPERATURE: 97.3 F | SYSTOLIC BLOOD PRESSURE: 125 MMHG | RESPIRATION RATE: 16 BRPM | HEART RATE: 75 BPM

## 2018-01-01 DIAGNOSIS — C48.2 MALIGNANT NEOPLASM OF PERITONEUM (HCC): Primary | ICD-10-CM

## 2018-01-01 DIAGNOSIS — G62.0 CHEMOTHERAPY-INDUCED PERIPHERAL NEUROPATHY (HCC): ICD-10-CM

## 2018-01-01 DIAGNOSIS — K66.8 PNEUMOPERITONEUM: Primary | ICD-10-CM

## 2018-01-01 DIAGNOSIS — K59.00 CONSTIPATION, UNSPECIFIED CONSTIPATION TYPE: Primary | ICD-10-CM

## 2018-01-01 DIAGNOSIS — C48.2 MALIGNANT NEOPLASM OF PERITONEUM (HCC): ICD-10-CM

## 2018-01-01 DIAGNOSIS — E87.20 METABOLIC ACIDOSIS: ICD-10-CM

## 2018-01-01 DIAGNOSIS — N17.8 ACUTE RENAL FAILURE WITH OTHER SPECIFIED PATHOLOGICAL KIDNEY LESION SUPERIMPOSED ON CHRONIC KIDNEY DISEASE, UNSPECIFIED CKD STAGE (HCC): ICD-10-CM

## 2018-01-01 DIAGNOSIS — C80.0 DISSEMINATED MALIGNANT NEOPLASM (HCC): Primary | ICD-10-CM

## 2018-01-01 DIAGNOSIS — N18.9 ACUTE RENAL FAILURE WITH OTHER SPECIFIED PATHOLOGICAL KIDNEY LESION SUPERIMPOSED ON CHRONIC KIDNEY DISEASE, UNSPECIFIED CKD STAGE (HCC): ICD-10-CM

## 2018-01-01 DIAGNOSIS — T45.1X5A CHEMOTHERAPY-INDUCED PERIPHERAL NEUROPATHY (HCC): ICD-10-CM

## 2018-01-01 LAB
A-A DO2: 124.5 MMHG (ref 0–300)
A-A DO2: 148.6 MMHG (ref 0–300)
A-A DO2: 169 MMHG (ref 0–300)
A-A DO2: 204.8 MMHG (ref 0–300)
A-A DO2: 209.9 MMHG (ref 0–300)
A-A DO2: 220.4 MMHG (ref 0–300)
A-A DO2: 53.5 MMHG (ref 0–300)
ALBUMIN SERPL-MCNC: 4.2 G/DL (ref 3.4–4.8)
ALBUMIN SERPL-MCNC: 4.2 G/DL (ref 3.4–4.8)
ALBUMIN SERPL-MCNC: 4.3 G/DL (ref 3.4–4.8)
ALBUMIN SERPL-MCNC: 4.6 G/DL (ref 3.4–4.8)
ALBUMIN/GLOB SERPL: 1.4 G/DL (ref 1.5–2.5)
ALBUMIN/GLOB SERPL: 1.4 G/DL (ref 1.5–2.5)
ALBUMIN/GLOB SERPL: 1.5 G/DL (ref 1.5–2.5)
ALBUMIN/GLOB SERPL: 1.6 G/DL (ref 1.5–2.5)
ALP SERPL-CCNC: 100 U/L (ref 35–104)
ALP SERPL-CCNC: 104 U/L (ref 35–104)
ALP SERPL-CCNC: 114 U/L (ref 35–104)
ALP SERPL-CCNC: 93 U/L (ref 35–104)
ALT SERPL W P-5'-P-CCNC: 21 U/L (ref 10–36)
ALT SERPL W P-5'-P-CCNC: 26 U/L (ref 10–36)
ALT SERPL W P-5'-P-CCNC: 30 U/L (ref 10–36)
ALT SERPL W P-5'-P-CCNC: 34 U/L (ref 10–36)
AMYLASE SERPL-CCNC: 170 U/L (ref 28–100)
ANION GAP SERPL CALCULATED.3IONS-SCNC: 10.8 MMOL/L (ref 3.6–11.2)
ANION GAP SERPL CALCULATED.3IONS-SCNC: 11.3 MMOL/L (ref 3.6–11.2)
ANION GAP SERPL CALCULATED.3IONS-SCNC: 11.6 MMOL/L (ref 3.6–11.2)
ANION GAP SERPL CALCULATED.3IONS-SCNC: 11.6 MMOL/L (ref 3.6–11.2)
ANION GAP SERPL CALCULATED.3IONS-SCNC: 12.7 MMOL/L (ref 3.6–11.2)
ANION GAP SERPL CALCULATED.3IONS-SCNC: 4.3 MMOL/L (ref 3.6–11.2)
ANION GAP SERPL CALCULATED.3IONS-SCNC: 7.2 MMOL/L (ref 3.6–11.2)
ANION GAP SERPL CALCULATED.3IONS-SCNC: 7.8 MMOL/L (ref 3.6–11.2)
ANION GAP SERPL CALCULATED.3IONS-SCNC: 7.9 MMOL/L (ref 3.6–11.2)
ANION GAP SERPL CALCULATED.3IONS-SCNC: 9.6 MMOL/L (ref 3.6–11.2)
ANION GAP SERPL CALCULATED.3IONS-SCNC: 9.8 MMOL/L (ref 3.6–11.2)
ANION GAP SERPL CALCULATED.3IONS-SCNC: 9.8 MMOL/L (ref 3.6–11.2)
ANISOCYTOSIS BLD QL: ABNORMAL
ARTERIAL PATENCY WRIST A: POSITIVE
AST SERPL-CCNC: 25 U/L (ref 10–30)
AST SERPL-CCNC: 26 U/L (ref 10–30)
AST SERPL-CCNC: 35 U/L (ref 10–30)
AST SERPL-CCNC: 50 U/L (ref 10–30)
ATMOSPHERIC PRESS: 727 MMHG
ATMOSPHERIC PRESS: 728 MMHG
ATMOSPHERIC PRESS: 729 MMHG
ATMOSPHERIC PRESS: 730 MMHG
BACTERIA SPEC AEROBE CULT: ABNORMAL
BACTERIA SPEC AEROBE CULT: NORMAL
BACTERIA SPEC AEROBE CULT: NORMAL
BASE EXCESS BLDA CALC-SCNC: -10 MMOL/L
BASE EXCESS BLDA CALC-SCNC: -8.2 MMOL/L
BASE EXCESS BLDA CALC-SCNC: -8.3 MMOL/L
BASE EXCESS BLDA CALC-SCNC: 4 MMOL/L
BASE EXCESS BLDA CALC-SCNC: 4.7 MMOL/L
BASE EXCESS BLDA CALC-SCNC: 6.3 MMOL/L
BASE EXCESS BLDA CALC-SCNC: 8.9 MMOL/L
BASE EXCESS BLDV CALC-SCNC: 1.7 MMOL/L
BASOPHILS # BLD AUTO: 0.01 10*3/MM3 (ref 0–0.3)
BASOPHILS # BLD AUTO: 0.02 10*3/MM3 (ref 0–0.3)
BASOPHILS # BLD AUTO: 0.03 10*3/MM3 (ref 0–0.3)
BASOPHILS # BLD AUTO: 0.04 10*3/MM3 (ref 0–0.3)
BASOPHILS # BLD AUTO: 0.05 10*3/MM3 (ref 0–0.3)
BASOPHILS # BLD MANUAL: 0.03 10*3/MM3 (ref 0–0.3)
BASOPHILS NFR BLD AUTO: 0.2 % (ref 0–2)
BASOPHILS NFR BLD AUTO: 0.4 % (ref 0–2)
BASOPHILS NFR BLD AUTO: 0.4 % (ref 0–2)
BASOPHILS NFR BLD AUTO: 0.5 % (ref 0–2)
BASOPHILS NFR BLD AUTO: 0.6 % (ref 0–2)
BASOPHILS NFR BLD AUTO: 1 % (ref 0–2)
BDY SITE: ABNORMAL
BDY SITE: NORMAL
BILIRUB SERPL-MCNC: 0.3 MG/DL (ref 0.2–1.8)
BILIRUB SERPL-MCNC: 0.5 MG/DL (ref 0.2–1.8)
BILIRUB SERPL-MCNC: 0.7 MG/DL (ref 0.2–1.8)
BILIRUB SERPL-MCNC: 1.1 MG/DL (ref 0.2–1.8)
BODY TEMPERATURE: 98.6 C
BUN BLD-MCNC: 29 MG/DL (ref 7–21)
BUN BLD-MCNC: 31 MG/DL (ref 7–21)
BUN BLD-MCNC: 33 MG/DL (ref 7–21)
BUN BLD-MCNC: 34 MG/DL (ref 7–21)
BUN BLD-MCNC: 36 MG/DL (ref 7–21)
BUN BLD-MCNC: 43 MG/DL (ref 7–21)
BUN BLD-MCNC: 45 MG/DL (ref 7–21)
BUN BLD-MCNC: 48 MG/DL (ref 7–21)
BUN BLD-MCNC: 50 MG/DL (ref 7–21)
BUN BLD-MCNC: 52 MG/DL (ref 7–21)
BUN BLD-MCNC: 54 MG/DL (ref 7–21)
BUN BLD-MCNC: 64 MG/DL (ref 7–21)
BUN/CREAT SERPL: 17 (ref 7–25)
BUN/CREAT SERPL: 17.5 (ref 7–25)
BUN/CREAT SERPL: 18.9 (ref 7–25)
BUN/CREAT SERPL: 19.8 (ref 7–25)
BUN/CREAT SERPL: 20.9 (ref 7–25)
BUN/CREAT SERPL: 23.9 (ref 7–25)
BUN/CREAT SERPL: 24.7 (ref 7–25)
BUN/CREAT SERPL: 29.6 (ref 7–25)
BUN/CREAT SERPL: 30.8 (ref 7–25)
BUN/CREAT SERPL: 31.5 (ref 7–25)
BUN/CREAT SERPL: 32.1 (ref 7–25)
BUN/CREAT SERPL: 35.4 (ref 7–25)
CALCIUM SPEC-SCNC: 7.2 MG/DL (ref 7.7–10)
CALCIUM SPEC-SCNC: 7.6 MG/DL (ref 7.7–10)
CALCIUM SPEC-SCNC: 7.7 MG/DL (ref 7.7–10)
CALCIUM SPEC-SCNC: 7.8 MG/DL (ref 7.7–10)
CALCIUM SPEC-SCNC: 7.9 MG/DL (ref 7.7–10)
CALCIUM SPEC-SCNC: 8.3 MG/DL (ref 7.7–10)
CALCIUM SPEC-SCNC: 8.7 MG/DL (ref 7.7–10)
CALCIUM SPEC-SCNC: 8.9 MG/DL (ref 7.7–10)
CALCIUM SPEC-SCNC: 9.1 MG/DL (ref 7.7–10)
CALCIUM SPEC-SCNC: 9.2 MG/DL (ref 7.7–10)
CALCIUM SPEC-SCNC: 9.3 MG/DL (ref 7.7–10)
CALCIUM SPEC-SCNC: 9.4 MG/DL (ref 7.7–10)
CANCER AG125 SERPL-ACNC: 2543 U/ML (ref 0–38.1)
CHLORIDE SERPL-SCNC: 105 MMOL/L (ref 99–112)
CHLORIDE SERPL-SCNC: 106 MMOL/L (ref 99–112)
CHLORIDE SERPL-SCNC: 106 MMOL/L (ref 99–112)
CHLORIDE SERPL-SCNC: 108 MMOL/L (ref 99–112)
CHLORIDE SERPL-SCNC: 109 MMOL/L (ref 99–112)
CHLORIDE SERPL-SCNC: 110 MMOL/L (ref 99–112)
CHLORIDE SERPL-SCNC: 111 MMOL/L (ref 99–112)
CHLORIDE SERPL-SCNC: 112 MMOL/L (ref 99–112)
CHLORIDE SERPL-SCNC: 112 MMOL/L (ref 99–112)
CHLORIDE SERPL-SCNC: 113 MMOL/L (ref 99–112)
CHLORIDE SERPL-SCNC: 118 MMOL/L (ref 99–112)
CHLORIDE SERPL-SCNC: 119 MMOL/L (ref 99–112)
CO2 SERPL-SCNC: 16.7 MMOL/L (ref 24.3–31.9)
CO2 SERPL-SCNC: 18.2 MMOL/L (ref 24.3–31.9)
CO2 SERPL-SCNC: 19.4 MMOL/L (ref 24.3–31.9)
CO2 SERPL-SCNC: 20.4 MMOL/L (ref 24.3–31.9)
CO2 SERPL-SCNC: 23.8 MMOL/L (ref 24.3–31.9)
CO2 SERPL-SCNC: 24.3 MMOL/L (ref 24.3–31.9)
CO2 SERPL-SCNC: 25.2 MMOL/L (ref 24.3–31.9)
CO2 SERPL-SCNC: 25.2 MMOL/L (ref 24.3–31.9)
CO2 SERPL-SCNC: 25.4 MMOL/L (ref 24.3–31.9)
CO2 SERPL-SCNC: 25.7 MMOL/L (ref 24.3–31.9)
CO2 SERPL-SCNC: 29.1 MMOL/L (ref 24.3–31.9)
CO2 SERPL-SCNC: 30.2 MMOL/L (ref 24.3–31.9)
COHGB MFR BLD: 0.9 % (ref 0–5)
COHGB MFR BLD: 1 % (ref 0–5)
COHGB MFR BLD: 1.1 % (ref 0–5)
COHGB MFR BLD: 1.3 % (ref 0–5)
COHGB MFR BLD: 1.3 % (ref 0–5)
COHGB MFR BLD: 1.4 % (ref 0–5)
COHGB MFR BLD: 2.4 % (ref 0–5)
CREAT BLD-MCNC: 1.15 MG/DL (ref 0.43–1.29)
CREAT BLD-MCNC: 1.17 MG/DL (ref 0.43–1.29)
CREAT BLD-MCNC: 1.43 MG/DL (ref 0.43–1.29)
CREAT BLD-MCNC: 1.48 MG/DL (ref 0.43–1.29)
CREAT BLD-MCNC: 1.56 MG/DL (ref 0.43–1.29)
CREAT BLD-MCNC: 1.66 MG/DL (ref 0.43–1.29)
CREAT BLD-MCNC: 1.81 MG/DL (ref 0.43–1.29)
CREAT BLD-MCNC: 1.94 MG/DL (ref 0.43–1.29)
CREAT BLD-MCNC: 2.17 MG/DL (ref 0.43–1.29)
CREAT BLD-MCNC: 2.18 MG/DL (ref 0.43–1.29)
CREAT BLD-MCNC: 2.19 MG/DL (ref 0.43–1.29)
CREAT BLD-MCNC: 2.54 MG/DL (ref 0.43–1.29)
CRP SERPL-MCNC: 26.78 MG/DL (ref 0–0.99)
CRP SERPL-MCNC: 35.15 MG/DL (ref 0–0.99)
CRP SERPL-MCNC: 38.67 MG/DL (ref 0–0.99)
D-LACTATE SERPL-SCNC: 2 MMOL/L (ref 0.5–2)
DEPRECATED RDW RBC AUTO: 45.2 FL (ref 37–54)
DEPRECATED RDW RBC AUTO: 45.8 FL (ref 37–54)
DEPRECATED RDW RBC AUTO: 46.3 FL (ref 37–54)
DEPRECATED RDW RBC AUTO: 47.9 FL (ref 37–54)
DEPRECATED RDW RBC AUTO: 48.2 FL (ref 37–54)
DEPRECATED RDW RBC AUTO: 48.3 FL (ref 37–54)
DEPRECATED RDW RBC AUTO: 49.1 FL (ref 37–54)
DEPRECATED RDW RBC AUTO: 49.3 FL (ref 37–54)
DEPRECATED RDW RBC AUTO: 49.6 FL (ref 37–54)
DEPRECATED RDW RBC AUTO: 50 FL (ref 37–54)
DEPRECATED RDW RBC AUTO: 52 FL (ref 37–54)
EOSINOPHIL # BLD AUTO: 0.01 10*3/MM3 (ref 0–0.7)
EOSINOPHIL # BLD AUTO: 0.04 10*3/MM3 (ref 0–0.7)
EOSINOPHIL # BLD AUTO: 0.12 10*3/MM3 (ref 0–0.7)
EOSINOPHIL # BLD AUTO: 0.12 10*3/MM3 (ref 0–0.7)
EOSINOPHIL # BLD AUTO: 0.2 10*3/MM3 (ref 0–0.7)
EOSINOPHIL NFR BLD AUTO: 0.3 % (ref 0–7)
EOSINOPHIL NFR BLD AUTO: 0.4 % (ref 0–7)
EOSINOPHIL NFR BLD AUTO: 1.7 % (ref 0–7)
EOSINOPHIL NFR BLD AUTO: 1.7 % (ref 0–7)
EOSINOPHIL NFR BLD AUTO: 1.8 % (ref 0–7)
ERYTHROCYTE [DISTWIDTH] IN BLOOD BY AUTOMATED COUNT: 14.2 % (ref 11.5–14.5)
ERYTHROCYTE [DISTWIDTH] IN BLOOD BY AUTOMATED COUNT: 15 % (ref 11.5–14.5)
ERYTHROCYTE [DISTWIDTH] IN BLOOD BY AUTOMATED COUNT: 15.2 % (ref 11.5–14.5)
ERYTHROCYTE [DISTWIDTH] IN BLOOD BY AUTOMATED COUNT: 15.3 % (ref 11.5–14.5)
ERYTHROCYTE [DISTWIDTH] IN BLOOD BY AUTOMATED COUNT: 15.4 % (ref 11.5–14.5)
ERYTHROCYTE [DISTWIDTH] IN BLOOD BY AUTOMATED COUNT: 15.4 % (ref 11.5–14.5)
ERYTHROCYTE [DISTWIDTH] IN BLOOD BY AUTOMATED COUNT: 15.5 % (ref 11.5–14.5)
ERYTHROCYTE [DISTWIDTH] IN BLOOD BY AUTOMATED COUNT: 15.5 % (ref 11.5–14.5)
ERYTHROCYTE [DISTWIDTH] IN BLOOD BY AUTOMATED COUNT: 15.8 % (ref 11.5–14.5)
ERYTHROCYTE [DISTWIDTH] IN BLOOD BY AUTOMATED COUNT: 16.1 % (ref 11.5–14.5)
ERYTHROCYTE [DISTWIDTH] IN BLOOD BY AUTOMATED COUNT: 16.6 % (ref 11.5–14.5)
GAS FLOW AIRWAY: 40 LPM
GFR SERPL CREATININE-BSD FRML MDRD: 18 ML/MIN/1.73
GFR SERPL CREATININE-BSD FRML MDRD: 22 ML/MIN/1.73
GFR SERPL CREATININE-BSD FRML MDRD: 25 ML/MIN/1.73
GFR SERPL CREATININE-BSD FRML MDRD: 27 ML/MIN/1.73
GFR SERPL CREATININE-BSD FRML MDRD: 30 ML/MIN/1.73
GFR SERPL CREATININE-BSD FRML MDRD: 32 ML/MIN/1.73
GFR SERPL CREATININE-BSD FRML MDRD: 34 ML/MIN/1.73
GFR SERPL CREATININE-BSD FRML MDRD: 35 ML/MIN/1.73
GFR SERPL CREATININE-BSD FRML MDRD: 45 ML/MIN/1.73
GFR SERPL CREATININE-BSD FRML MDRD: 45 ML/MIN/1.73
GLOBULIN UR ELPH-MCNC: 2.7 GM/DL
GLOBULIN UR ELPH-MCNC: 2.8 GM/DL
GLOBULIN UR ELPH-MCNC: 3.1 GM/DL
GLOBULIN UR ELPH-MCNC: 3.2 GM/DL
GLUCOSE BLD-MCNC: 101 MG/DL (ref 70–110)
GLUCOSE BLD-MCNC: 103 MG/DL (ref 70–110)
GLUCOSE BLD-MCNC: 106 MG/DL (ref 70–110)
GLUCOSE BLD-MCNC: 115 MG/DL (ref 70–110)
GLUCOSE BLD-MCNC: 117 MG/DL (ref 70–110)
GLUCOSE BLD-MCNC: 126 MG/DL (ref 70–110)
GLUCOSE BLD-MCNC: 131 MG/DL (ref 70–110)
GLUCOSE BLD-MCNC: 136 MG/DL (ref 70–110)
GLUCOSE BLD-MCNC: 86 MG/DL (ref 70–110)
GLUCOSE BLD-MCNC: 90 MG/DL (ref 70–110)
GLUCOSE BLD-MCNC: 92 MG/DL (ref 70–110)
GLUCOSE BLD-MCNC: 94 MG/DL (ref 70–110)
GLUCOSE BLDC GLUCOMTR-MCNC: 122 MG/DL (ref 70–130)
GLUCOSE BLDC GLUCOMTR-MCNC: 126 MG/DL (ref 70–130)
GLUCOSE BLDC GLUCOMTR-MCNC: 145 MG/DL (ref 70–130)
GLUCOSE BLDC GLUCOMTR-MCNC: 94 MG/DL (ref 70–130)
GRAM STN SPEC: ABNORMAL
HCO3 BLDA-SCNC: 15.9 MMOL/L (ref 22–26)
HCO3 BLDA-SCNC: 16.9 MMOL/L (ref 22–26)
HCO3 BLDA-SCNC: 17.9 MMOL/L (ref 22–26)
HCO3 BLDA-SCNC: 25.3 MMOL/L (ref 22–26)
HCO3 BLDA-SCNC: 26.5 MMOL/L (ref 22–26)
HCO3 BLDA-SCNC: 29.2 MMOL/L (ref 22–26)
HCO3 BLDA-SCNC: 32.5 MMOL/L (ref 22–26)
HCO3 BLDV-SCNC: 25 MMOL/L
HCT VFR BLD AUTO: 31.3 % (ref 37–47)
HCT VFR BLD AUTO: 33.9 % (ref 37–47)
HCT VFR BLD AUTO: 35.7 % (ref 37–47)
HCT VFR BLD AUTO: 37.8 % (ref 37–47)
HCT VFR BLD AUTO: 39.2 % (ref 37–47)
HCT VFR BLD AUTO: 39.7 % (ref 37–47)
HCT VFR BLD AUTO: 39.8 % (ref 37–47)
HCT VFR BLD AUTO: 40.7 % (ref 37–47)
HCT VFR BLD AUTO: 41.3 % (ref 37–47)
HCT VFR BLD AUTO: 42.2 % (ref 37–47)
HCT VFR BLD AUTO: 42.3 % (ref 37–47)
HCT VFR BLD CALC: 32 % (ref 37–47)
HCT VFR BLD CALC: 32 % (ref 37–47)
HCT VFR BLD CALC: 33 % (ref 37–47)
HCT VFR BLD CALC: 34 % (ref 37–47)
HCT VFR BLD CALC: 39 % (ref 37–47)
HCT VFR BLD CALC: 39 % (ref 37–47)
HCT VFR BLD CALC: 40 % (ref 37–47)
HGB BLD-MCNC: 10.2 G/DL (ref 12–16)
HGB BLD-MCNC: 11.2 G/DL (ref 12–16)
HGB BLD-MCNC: 11.4 G/DL (ref 12–16)
HGB BLD-MCNC: 12.2 G/DL (ref 12–16)
HGB BLD-MCNC: 12.6 G/DL (ref 12–16)
HGB BLD-MCNC: 12.8 G/DL (ref 12–16)
HGB BLD-MCNC: 12.9 G/DL (ref 12–16)
HGB BLD-MCNC: 12.9 G/DL (ref 12–16)
HGB BLD-MCNC: 13 G/DL (ref 12–16)
HGB BLD-MCNC: 13.6 G/DL (ref 12–16)
HGB BLD-MCNC: 13.6 G/DL (ref 12–16)
HGB BLDA-MCNC: 10.8 G/DL (ref 12–16)
HGB BLDA-MCNC: 11 G/DL (ref 12–16)
HGB BLDA-MCNC: 11.2 G/DL (ref 12–16)
HGB BLDA-MCNC: 11.5 G/DL (ref 12–16)
HGB BLDA-MCNC: 12.5 G/DL (ref 12–16)
HGB BLDA-MCNC: 13.2 G/DL (ref 12–16)
HGB BLDA-MCNC: 13.3 G/DL (ref 12–16)
HGB BLDA-MCNC: 13.6 G/DL (ref 12–16)
HOROWITZ INDEX BLD+IHG-RTO: 21 %
HOROWITZ INDEX BLD+IHG-RTO: 40 %
HOROWITZ INDEX BLD+IHG-RTO: 50 %
HYPOCHROMIA BLD QL: ABNORMAL
IMM GRANULOCYTES # BLD: 0.01 10*3/MM3 (ref 0–0.03)
IMM GRANULOCYTES # BLD: 0.03 10*3/MM3 (ref 0–0.03)
IMM GRANULOCYTES # BLD: 0.05 10*3/MM3 (ref 0–0.03)
IMM GRANULOCYTES # BLD: 0.05 10*3/MM3 (ref 0–0.03)
IMM GRANULOCYTES # BLD: 0.08 10*3/MM3 (ref 0–0.03)
IMM GRANULOCYTES NFR BLD: 0.4 % (ref 0–0.5)
IMM GRANULOCYTES NFR BLD: 0.4 % (ref 0–0.5)
IMM GRANULOCYTES NFR BLD: 0.5 % (ref 0–0.5)
IMM GRANULOCYTES NFR BLD: 0.7 % (ref 0–0.5)
IMM GRANULOCYTES NFR BLD: 0.7 % (ref 0–0.5)
LAB AP CASE REPORT: NORMAL
LARGE PLATELETS: ABNORMAL
LIPASE SERPL-CCNC: 82 U/L (ref 13–60)
LYMPHOCYTES # BLD AUTO: 0.42 10*3/MM3 (ref 1–3)
LYMPHOCYTES # BLD AUTO: 1.14 10*3/MM3 (ref 1–3)
LYMPHOCYTES # BLD AUTO: 1.22 10*3/MM3 (ref 1–3)
LYMPHOCYTES # BLD AUTO: 1.28 10*3/MM3 (ref 1–3)
LYMPHOCYTES # BLD AUTO: 1.62 10*3/MM3 (ref 1–3)
LYMPHOCYTES # BLD MANUAL: 0.5 10*3/MM3 (ref 1–3)
LYMPHOCYTES # BLD MANUAL: 0.73 10*3/MM3 (ref 1–3)
LYMPHOCYTES # BLD MANUAL: 1.04 10*3/MM3 (ref 1–3)
LYMPHOCYTES # BLD MANUAL: 1.21 10*3/MM3 (ref 1–3)
LYMPHOCYTES # BLD MANUAL: 2.45 10*3/MM3 (ref 1–3)
LYMPHOCYTES # BLD MANUAL: 2.49 10*3/MM3 (ref 1–3)
LYMPHOCYTES NFR BLD AUTO: 10.1 % (ref 16–46)
LYMPHOCYTES NFR BLD AUTO: 10.4 % (ref 16–46)
LYMPHOCYTES NFR BLD AUTO: 15.2 % (ref 16–46)
LYMPHOCYTES NFR BLD AUTO: 18.1 % (ref 16–46)
LYMPHOCYTES NFR BLD AUTO: 23.3 % (ref 16–46)
LYMPHOCYTES NFR BLD MANUAL: 10 % (ref 16–46)
LYMPHOCYTES NFR BLD MANUAL: 11 % (ref 16–46)
LYMPHOCYTES NFR BLD MANUAL: 18 % (ref 16–46)
LYMPHOCYTES NFR BLD MANUAL: 4 % (ref 0–12)
LYMPHOCYTES NFR BLD MANUAL: 4 % (ref 0–12)
LYMPHOCYTES NFR BLD MANUAL: 4 % (ref 16–46)
LYMPHOCYTES NFR BLD MANUAL: 5 % (ref 0–12)
LYMPHOCYTES NFR BLD MANUAL: 5 % (ref 0–12)
LYMPHOCYTES NFR BLD MANUAL: 6 % (ref 0–12)
LYMPHOCYTES NFR BLD MANUAL: 6 % (ref 16–46)
LYMPHOCYTES NFR BLD MANUAL: 7 % (ref 0–12)
LYMPHOCYTES NFR BLD MANUAL: 9 % (ref 16–46)
MCH RBC QN AUTO: 27.5 PG (ref 27–33)
MCH RBC QN AUTO: 27.5 PG (ref 27–33)
MCH RBC QN AUTO: 27.7 PG (ref 27–33)
MCH RBC QN AUTO: 27.9 PG (ref 27–33)
MCH RBC QN AUTO: 28.1 PG (ref 27–33)
MCH RBC QN AUTO: 28.2 PG (ref 27–33)
MCH RBC QN AUTO: 28.3 PG (ref 27–33)
MCH RBC QN AUTO: 28.7 PG (ref 27–33)
MCH RBC QN AUTO: 28.7 PG (ref 27–33)
MCHC RBC AUTO-ENTMCNC: 31.5 G/DL (ref 33–37)
MCHC RBC AUTO-ENTMCNC: 31.7 G/DL (ref 33–37)
MCHC RBC AUTO-ENTMCNC: 31.7 G/DL (ref 33–37)
MCHC RBC AUTO-ENTMCNC: 31.9 G/DL (ref 33–37)
MCHC RBC AUTO-ENTMCNC: 32.2 G/DL (ref 33–37)
MCHC RBC AUTO-ENTMCNC: 32.2 G/DL (ref 33–37)
MCHC RBC AUTO-ENTMCNC: 32.3 G/DL (ref 33–37)
MCHC RBC AUTO-ENTMCNC: 32.4 G/DL (ref 33–37)
MCHC RBC AUTO-ENTMCNC: 32.6 G/DL (ref 33–37)
MCHC RBC AUTO-ENTMCNC: 32.7 G/DL (ref 33–37)
MCHC RBC AUTO-ENTMCNC: 33 G/DL (ref 33–37)
MCV RBC AUTO: 85.1 FL (ref 80–94)
MCV RBC AUTO: 85.2 FL (ref 80–94)
MCV RBC AUTO: 85.6 FL (ref 80–94)
MCV RBC AUTO: 85.8 FL (ref 80–94)
MCV RBC AUTO: 86.7 FL (ref 80–94)
MCV RBC AUTO: 87.4 FL (ref 80–94)
MCV RBC AUTO: 87.5 FL (ref 80–94)
MCV RBC AUTO: 87.8 FL (ref 80–94)
MCV RBC AUTO: 87.9 FL (ref 80–94)
MCV RBC AUTO: 88.6 FL (ref 80–94)
MCV RBC AUTO: 90.4 FL (ref 80–94)
METAMYELOCYTES NFR BLD MANUAL: 2 % (ref 0–0)
METAMYELOCYTES NFR BLD MANUAL: 3 % (ref 0–0)
METHGB BLD QL: 0.1 % (ref 0–3)
METHGB BLD QL: 0.1 % (ref 0–3)
METHGB BLD QL: 0.2 % (ref 0–3)
METHGB BLD QL: 0.3 % (ref 0–3)
MODALITY: ABNORMAL
MODALITY: NORMAL
MONOCYTES # BLD AUTO: 0.15 10*3/MM3 (ref 0.1–0.9)
MONOCYTES # BLD AUTO: 0.17 10*3/MM3 (ref 0.1–0.9)
MONOCYTES # BLD AUTO: 0.49 10*3/MM3 (ref 0.1–0.9)
MONOCYTES # BLD AUTO: 0.53 10*3/MM3 (ref 0.1–0.9)
MONOCYTES # BLD AUTO: 0.73 10*3/MM3 (ref 0.1–0.9)
MONOCYTES # BLD AUTO: 0.89 10*3/MM3 (ref 0.1–0.9)
MONOCYTES # BLD AUTO: 0.92 10*3/MM3 (ref 0.1–0.9)
MONOCYTES # BLD AUTO: 1.01 10*3/MM3 (ref 0.1–0.9)
MONOCYTES # BLD AUTO: 1.02 10*3/MM3 (ref 0.1–0.9)
MONOCYTES # BLD AUTO: 1.11 10*3/MM3 (ref 0.1–0.9)
MONOCYTES # BLD AUTO: 1.12 10*3/MM3 (ref 0.1–0.9)
MONOCYTES NFR BLD AUTO: 5.4 % (ref 0–12)
MONOCYTES NFR BLD AUTO: 6.9 % (ref 0–12)
MONOCYTES NFR BLD AUTO: 7.6 % (ref 0–12)
MONOCYTES NFR BLD AUTO: 9.3 % (ref 0–12)
MONOCYTES NFR BLD AUTO: 9.3 % (ref 0–12)
MYELOCYTES NFR BLD MANUAL: 1 % (ref 0–0)
MYELOCYTES NFR BLD MANUAL: 1 % (ref 0–0)
MYELOCYTES NFR BLD MANUAL: 2 % (ref 0–0)
NEUTROPHILS # BLD AUTO: 1.96 10*3/MM3 (ref 1.4–6.5)
NEUTROPHILS # BLD AUTO: 16.32 10*3/MM3 (ref 1.4–6.5)
NEUTROPHILS # BLD AUTO: 17.14 10*3/MM3 (ref 1.4–6.5)
NEUTROPHILS # BLD AUTO: 18.29 10*3/MM3 (ref 1.4–6.5)
NEUTROPHILS # BLD AUTO: 2.16 10*3/MM3 (ref 1.4–6.5)
NEUTROPHILS # BLD AUTO: 23.55 10*3/MM3 (ref 1.4–6.5)
NEUTROPHILS # BLD AUTO: 4.59 10*3/MM3 (ref 1.4–6.5)
NEUTROPHILS # BLD AUTO: 5.12 10*3/MM3 (ref 1.4–6.5)
NEUTROPHILS # BLD AUTO: 8.44 10*3/MM3 (ref 1.4–6.5)
NEUTROPHILS # BLD AUTO: 8.51 10*3/MM3 (ref 1.4–6.5)
NEUTROPHILS # BLD AUTO: 9.59 10*3/MM3 (ref 1.4–6.5)
NEUTROPHILS NFR BLD AUTO: 66.3 % (ref 40–75)
NEUTROPHILS NFR BLD AUTO: 72.3 % (ref 40–75)
NEUTROPHILS NFR BLD AUTO: 77.5 % (ref 40–75)
NEUTROPHILS NFR BLD AUTO: 78.2 % (ref 40–75)
NEUTROPHILS NFR BLD AUTO: 79.4 % (ref 40–75)
NEUTROPHILS NFR BLD MANUAL: 53 % (ref 40–75)
NEUTROPHILS NFR BLD MANUAL: 69 % (ref 40–75)
NEUTROPHILS NFR BLD MANUAL: 80 % (ref 40–75)
NEUTROPHILS NFR BLD MANUAL: 83 % (ref 40–75)
NEUTROPHILS NFR BLD MANUAL: 85 % (ref 40–75)
NEUTROPHILS NFR BLD MANUAL: 88 % (ref 40–75)
NEUTS BAND NFR BLD MANUAL: 1 % (ref 0–8)
NEUTS BAND NFR BLD MANUAL: 12 % (ref 0–8)
NEUTS BAND NFR BLD MANUAL: 18 % (ref 0–8)
NEUTS BAND NFR BLD MANUAL: 2 % (ref 0–8)
NEUTS BAND NFR BLD MANUAL: 2 % (ref 0–8)
NRBC SPEC MANUAL: 1 /100 WBC (ref 0–0)
NRBC SPEC MANUAL: 1 /100 WBC (ref 0–0)
OSMOLALITY SERPL CALC.SUM OF ELEC: 286.7 MOSM/KG (ref 273–305)
OSMOLALITY SERPL CALC.SUM OF ELEC: 287.6 MOSM/KG (ref 273–305)
OSMOLALITY SERPL CALC.SUM OF ELEC: 289.4 MOSM/KG (ref 273–305)
OSMOLALITY SERPL CALC.SUM OF ELEC: 290 MOSM/KG (ref 273–305)
OSMOLALITY SERPL CALC.SUM OF ELEC: 292 MOSM/KG (ref 273–305)
OSMOLALITY SERPL CALC.SUM OF ELEC: 293.4 MOSM/KG (ref 273–305)
OSMOLALITY SERPL CALC.SUM OF ELEC: 297.4 MOSM/KG (ref 273–305)
OSMOLALITY SERPL CALC.SUM OF ELEC: 299.7 MOSM/KG (ref 273–305)
OSMOLALITY SERPL CALC.SUM OF ELEC: 299.7 MOSM/KG (ref 273–305)
OSMOLALITY SERPL CALC.SUM OF ELEC: 305.7 MOSM/KG (ref 273–305)
OSMOLALITY SERPL CALC.SUM OF ELEC: 308.7 MOSM/KG (ref 273–305)
OSMOLALITY SERPL CALC.SUM OF ELEC: 327.2 MOSM/KG (ref 273–305)
OXYHGB MFR BLDV: 85.6 % (ref 85–100)
OXYHGB MFR BLDV: 93.6 % (ref 85–100)
OXYHGB MFR BLDV: 93.9 % (ref 85–100)
OXYHGB MFR BLDV: 94.2 % (ref 85–100)
OXYHGB MFR BLDV: 94.6 % (ref 85–100)
OXYHGB MFR BLDV: 95 % (ref 85–100)
OXYHGB MFR BLDV: 96.3 % (ref 85–100)
PATH REPORT.FINAL DX SPEC: NORMAL
PCO2 BLDA: 27.3 MM HG (ref 35–45)
PCO2 BLDA: 28.9 MM HG (ref 35–45)
PCO2 BLDA: 30.4 MM HG (ref 35–45)
PCO2 BLDA: 35.7 MM HG (ref 35–45)
PCO2 BLDA: 38.9 MM HG (ref 35–45)
PCO2 BLDA: 40.6 MM HG (ref 35–45)
PCO2 BLDA: 40.8 MM HG (ref 35–45)
PCO2 BLDV: 34.6 MM HG
PEEP RESPIRATORY: 5 CM[H2O]
PH BLDA: 7.24 PH UNITS (ref 7.35–7.45)
PH BLDA: 7.28 PH UNITS (ref 7.35–7.45)
PH BLDA: 7.36 PH UNITS (ref 7.35–7.45)
PH BLDA: 7.52 PH UNITS (ref 7.35–7.45)
PH BLDA: 7.53 PH UNITS (ref 7.35–7.45)
PH BLDA: 7.56 PH UNITS (ref 7.35–7.45)
PH BLDA: 7.58 PH UNITS (ref 7.35–7.45)
PH BLDV: 7.48 PH UNITS
PH GAST: 7 [PH]
PLAT MORPH BLD: NORMAL
PLATELET # BLD AUTO: 203 10*3/MM3 (ref 130–400)
PLATELET # BLD AUTO: 227 10*3/MM3 (ref 130–400)
PLATELET # BLD AUTO: 232 10*3/MM3 (ref 130–400)
PLATELET # BLD AUTO: 233 10*3/MM3 (ref 130–400)
PLATELET # BLD AUTO: 268 10*3/MM3 (ref 130–400)
PLATELET # BLD AUTO: 268 10*3/MM3 (ref 130–400)
PLATELET # BLD AUTO: 273 10*3/MM3 (ref 130–400)
PLATELET # BLD AUTO: 273 10*3/MM3 (ref 130–400)
PLATELET # BLD AUTO: 277 10*3/MM3 (ref 130–400)
PLATELET # BLD AUTO: 302 10*3/MM3 (ref 130–400)
PLATELET # BLD AUTO: 342 10*3/MM3 (ref 130–400)
PMV BLD AUTO: 10.8 FL (ref 6–10)
PMV BLD AUTO: 10.9 FL (ref 6–10)
PMV BLD AUTO: 11 FL (ref 6–10)
PMV BLD AUTO: 11 FL (ref 6–10)
PMV BLD AUTO: 11.3 FL (ref 6–10)
PMV BLD AUTO: 11.4 FL (ref 6–10)
PMV BLD AUTO: 11.5 FL (ref 6–10)
PMV BLD AUTO: 11.6 FL (ref 6–10)
PMV BLD AUTO: 11.6 FL (ref 6–10)
PMV BLD AUTO: 11.9 FL (ref 6–10)
PMV BLD AUTO: 12.2 FL (ref 6–10)
PO2 BLDA: 101.8 MM HG (ref 80–100)
PO2 BLDA: 54.7 MM HG (ref 80–100)
PO2 BLDA: 72 MM HG (ref 80–100)
PO2 BLDA: 82.7 MM HG (ref 80–100)
PO2 BLDA: 85.9 MM HG (ref 80–100)
PO2 BLDA: 86.3 MM HG (ref 80–100)
PO2 BLDA: 89.5 MM HG (ref 80–100)
PO2 BLDV: 66.7 MM HG
POTASSIUM BLD-SCNC: 3.3 MMOL/L (ref 3.5–5.3)
POTASSIUM BLD-SCNC: 3.4 MMOL/L (ref 3.5–5.3)
POTASSIUM BLD-SCNC: 3.5 MMOL/L (ref 3.5–5.3)
POTASSIUM BLD-SCNC: 3.6 MMOL/L (ref 3.5–5.3)
POTASSIUM BLD-SCNC: 4.2 MMOL/L (ref 3.5–5.3)
POTASSIUM BLD-SCNC: 4.5 MMOL/L (ref 3.5–5.3)
POTASSIUM BLD-SCNC: 4.7 MMOL/L (ref 3.5–5.3)
POTASSIUM BLD-SCNC: 5 MMOL/L (ref 3.5–5.3)
PROT SERPL-MCNC: 7 G/DL (ref 6–8)
PROT SERPL-MCNC: 7 G/DL (ref 6–8)
PROT SERPL-MCNC: 7.3 G/DL (ref 6–8)
PROT SERPL-MCNC: 7.8 G/DL (ref 6–8)
PSV: 8 CMH2O
PSV: 8 CMH2O
RBC # BLD AUTO: 3.65 10*6/MM3 (ref 4.2–5.4)
RBC # BLD AUTO: 3.98 10*6/MM3 (ref 4.2–5.4)
RBC # BLD AUTO: 4.12 10*6/MM3 (ref 4.2–5.4)
RBC # BLD AUTO: 4.44 10*6/MM3 (ref 4.2–5.4)
RBC # BLD AUTO: 4.49 10*6/MM3 (ref 4.2–5.4)
RBC # BLD AUTO: 4.5 10*6/MM3 (ref 4.2–5.4)
RBC # BLD AUTO: 4.52 10*6/MM3 (ref 4.2–5.4)
RBC # BLD AUTO: 4.58 10*6/MM3 (ref 4.2–5.4)
RBC # BLD AUTO: 4.72 10*6/MM3 (ref 4.2–5.4)
RBC # BLD AUTO: 4.81 10*6/MM3 (ref 4.2–5.4)
RBC # BLD AUTO: 4.83 10*6/MM3 (ref 4.2–5.4)
RBC MORPH BLD: NORMAL
SAO2 % BLDCOA: 87.8 % (ref 90–100)
SAO2 % BLDCOA: 94.9 % (ref 90–100)
SAO2 % BLDCOA: 95.5 % (ref 90–100)
SAO2 % BLDCOA: 95.5 % (ref 90–100)
SAO2 % BLDCOA: 95.8 % (ref 90–100)
SAO2 % BLDCOA: 96.2 % (ref 90–100)
SAO2 % BLDCOA: 97.8 % (ref 90–100)
SAO2 % BLDCOV: 93.3 %
SCAN SLIDE: NORMAL
SET MECH RESP RATE: 12
SET MECH RESP RATE: 16
SET MECH RESP RATE: 20
SET MECH RESP RATE: 22
SODIUM BLD-SCNC: 140 MMOL/L (ref 135–153)
SODIUM BLD-SCNC: 141 MMOL/L (ref 135–153)
SODIUM BLD-SCNC: 142 MMOL/L (ref 135–153)
SODIUM BLD-SCNC: 143 MMOL/L (ref 135–153)
SODIUM BLD-SCNC: 144 MMOL/L (ref 135–153)
SODIUM BLD-SCNC: 144 MMOL/L (ref 135–153)
SODIUM BLD-SCNC: 146 MMOL/L (ref 135–153)
SODIUM BLD-SCNC: 149 MMOL/L (ref 135–153)
SODIUM BLD-SCNC: 155 MMOL/L (ref 135–153)
VENTILATOR MODE: ABNORMAL
VENTILATOR MODE: AC
VENTILATOR MODE: AC
VT ON VENT VENT: 450 ML
WBC NRBC COR # BLD: 10.42 10*3/MM3 (ref 4.5–12.5)
WBC NRBC COR # BLD: 10.97 10*3/MM3 (ref 4.5–12.5)
WBC NRBC COR # BLD: 12.07 10*3/MM3 (ref 4.5–12.5)
WBC NRBC COR # BLD: 18.34 10*3/MM3 (ref 4.5–12.5)
WBC NRBC COR # BLD: 2.76 10*3/MM3 (ref 4.5–12.5)
WBC NRBC COR # BLD: 2.76 10*3/MM3 (ref 4.5–12.5)
WBC NRBC COR # BLD: 20.17 10*3/MM3 (ref 4.5–12.5)
WBC NRBC COR # BLD: 22.31 10*3/MM3 (ref 4.5–12.5)
WBC NRBC COR # BLD: 27.7 10*3/MM3 (ref 4.5–12.5)
WBC NRBC COR # BLD: 6.94 10*3/MM3 (ref 4.5–12.5)
WBC NRBC COR # BLD: 7.08 10*3/MM3 (ref 4.5–12.5)

## 2018-01-01 PROCEDURE — 25010000002 HEPARIN FLUSH (PORCINE) 100 UNIT/ML SOLUTION: Performed by: INTERNAL MEDICINE

## 2018-01-01 PROCEDURE — 87070 CULTURE OTHR SPECIMN AEROBIC: CPT | Performed by: SURGERY

## 2018-01-01 PROCEDURE — 94799 UNLISTED PULMONARY SVC/PX: CPT

## 2018-01-01 PROCEDURE — 83050 HGB METHEMOGLOBIN QUAN: CPT | Performed by: NURSE PRACTITIONER

## 2018-01-01 PROCEDURE — 82805 BLOOD GASES W/O2 SATURATION: CPT | Performed by: INTERNAL MEDICINE

## 2018-01-01 PROCEDURE — 36600 WITHDRAWAL OF ARTERIAL BLOOD: CPT | Performed by: NURSE PRACTITIONER

## 2018-01-01 PROCEDURE — 87186 SC STD MICRODIL/AGAR DIL: CPT | Performed by: SURGERY

## 2018-01-01 PROCEDURE — 25010000002 PROPOFOL 1000 MG/ML EMULSION: Performed by: SURGERY

## 2018-01-01 PROCEDURE — 99024 POSTOP FOLLOW-UP VISIT: CPT | Performed by: SURGERY

## 2018-01-01 PROCEDURE — 94003 VENT MGMT INPAT SUBQ DAY: CPT

## 2018-01-01 PROCEDURE — 99213 OFFICE O/P EST LOW 20 MIN: CPT | Performed by: NURSE PRACTITIONER

## 2018-01-01 PROCEDURE — 85007 BL SMEAR W/DIFF WBC COUNT: CPT | Performed by: SURGERY

## 2018-01-01 PROCEDURE — 85025 COMPLETE CBC W/AUTO DIFF WBC: CPT | Performed by: SURGERY

## 2018-01-01 PROCEDURE — 74176 CT ABD & PELVIS W/O CONTRAST: CPT

## 2018-01-01 PROCEDURE — 99214 OFFICE O/P EST MOD 30 MIN: CPT | Performed by: INTERNAL MEDICINE

## 2018-01-01 PROCEDURE — 25010000002 HALOPERIDOL LACTATE PER 5 MG: Performed by: NURSE PRACTITIONER

## 2018-01-01 PROCEDURE — 25010000002 MORPHINE (PF) 10 MG/ML SOLUTION: Performed by: ANESTHESIOLOGY

## 2018-01-01 PROCEDURE — 74022 RADEX COMPL AQT ABD SERIES: CPT | Performed by: RADIOLOGY

## 2018-01-01 PROCEDURE — 25010000002 HYDRALAZINE PER 20 MG: Performed by: SURGERY

## 2018-01-01 PROCEDURE — 84132 ASSAY OF SERUM POTASSIUM: CPT | Performed by: SURGERY

## 2018-01-01 PROCEDURE — 25010000002 HEPARIN (PORCINE) PER 1000 UNITS: Performed by: SURGERY

## 2018-01-01 PROCEDURE — 82805 BLOOD GASES W/O2 SATURATION: CPT | Performed by: NURSE PRACTITIONER

## 2018-01-01 PROCEDURE — 80053 COMPREHEN METABOLIC PANEL: CPT | Performed by: EMERGENCY MEDICINE

## 2018-01-01 PROCEDURE — 99291 CRITICAL CARE FIRST HOUR: CPT | Performed by: INTERNAL MEDICINE

## 2018-01-01 PROCEDURE — 82375 ASSAY CARBOXYHB QUANT: CPT | Performed by: EMERGENCY MEDICINE

## 2018-01-01 PROCEDURE — 25010000002 HYDROCORTISONE SODIUM SUCCINATE 100 MG RECONSTITUTED SOLUTION: Performed by: ANESTHESIOLOGY

## 2018-01-01 PROCEDURE — 71045 X-RAY EXAM CHEST 1 VIEW: CPT

## 2018-01-01 PROCEDURE — 0D1B0Z4 BYPASS ILEUM TO CUTANEOUS, OPEN APPROACH: ICD-10-PCS | Performed by: SURGERY

## 2018-01-01 PROCEDURE — 99284 EMERGENCY DEPT VISIT MOD MDM: CPT

## 2018-01-01 PROCEDURE — 25010000002 MORPHINE PER 10 MG: Performed by: NURSE PRACTITIONER

## 2018-01-01 PROCEDURE — 87040 BLOOD CULTURE FOR BACTERIA: CPT | Performed by: EMERGENCY MEDICINE

## 2018-01-01 PROCEDURE — 82150 ASSAY OF AMYLASE: CPT | Performed by: PHYSICIAN ASSISTANT

## 2018-01-01 PROCEDURE — 80048 BASIC METABOLIC PNL TOTAL CA: CPT | Performed by: INTERNAL MEDICINE

## 2018-01-01 PROCEDURE — 80048 BASIC METABOLIC PNL TOTAL CA: CPT | Performed by: SURGERY

## 2018-01-01 PROCEDURE — 82962 GLUCOSE BLOOD TEST: CPT

## 2018-01-01 PROCEDURE — 83986 ASSAY PH BODY FLUID NOS: CPT | Performed by: ANESTHESIOLOGY

## 2018-01-01 PROCEDURE — 83690 ASSAY OF LIPASE: CPT | Performed by: PHYSICIAN ASSISTANT

## 2018-01-01 PROCEDURE — 25010000002 MORPHINE PER 10 MG: Performed by: EMERGENCY MEDICINE

## 2018-01-01 PROCEDURE — 0DTF0ZZ RESECTION OF RIGHT LARGE INTESTINE, OPEN APPROACH: ICD-10-PCS | Performed by: SURGERY

## 2018-01-01 PROCEDURE — 74176 CT ABD & PELVIS W/O CONTRAST: CPT | Performed by: RADIOLOGY

## 2018-01-01 PROCEDURE — 88309 TISSUE EXAM BY PATHOLOGIST: CPT | Performed by: SURGERY

## 2018-01-01 PROCEDURE — 80053 COMPREHEN METABOLIC PANEL: CPT | Performed by: INTERNAL MEDICINE

## 2018-01-01 PROCEDURE — 25010000002 PIPERACILLIN-TAZOBACTAM: Performed by: PHYSICIAN ASSISTANT

## 2018-01-01 PROCEDURE — 83050 HGB METHEMOGLOBIN QUAN: CPT | Performed by: EMERGENCY MEDICINE

## 2018-01-01 PROCEDURE — 25010000002 MORPHINE PER 10 MG: Performed by: SURGERY

## 2018-01-01 PROCEDURE — 87077 CULTURE AEROBIC IDENTIFY: CPT | Performed by: SURGERY

## 2018-01-01 PROCEDURE — 96361 HYDRATE IV INFUSION ADD-ON: CPT

## 2018-01-01 PROCEDURE — 25010000002 HYDRALAZINE PER 20 MG: Performed by: NURSE PRACTITIONER

## 2018-01-01 PROCEDURE — 82805 BLOOD GASES W/O2 SATURATION: CPT | Performed by: EMERGENCY MEDICINE

## 2018-01-01 PROCEDURE — 82375 ASSAY CARBOXYHB QUANT: CPT | Performed by: INTERNAL MEDICINE

## 2018-01-01 PROCEDURE — 96523 IRRIG DRUG DELIVERY DEVICE: CPT | Performed by: NURSE PRACTITIONER

## 2018-01-01 PROCEDURE — 44141 PARTIAL REMOVAL OF COLON: CPT | Performed by: SURGERY

## 2018-01-01 PROCEDURE — 25010000002 FENTANYL CITRATE (PF) 100 MCG/2ML SOLUTION: Performed by: ANESTHESIOLOGY

## 2018-01-01 PROCEDURE — 83050 HGB METHEMOGLOBIN QUAN: CPT | Performed by: INTERNAL MEDICINE

## 2018-01-01 PROCEDURE — 99223 1ST HOSP IP/OBS HIGH 75: CPT | Performed by: SURGERY

## 2018-01-01 PROCEDURE — 85025 COMPLETE CBC W/AUTO DIFF WBC: CPT | Performed by: EMERGENCY MEDICINE

## 2018-01-01 PROCEDURE — 25010000002 PROMETHAZINE PER 50 MG: Performed by: EMERGENCY MEDICINE

## 2018-01-01 PROCEDURE — 71045 X-RAY EXAM CHEST 1 VIEW: CPT | Performed by: RADIOLOGY

## 2018-01-01 PROCEDURE — 86304 IMMUNOASSAY TUMOR CA 125: CPT | Performed by: EMERGENCY MEDICINE

## 2018-01-01 PROCEDURE — 86140 C-REACTIVE PROTEIN: CPT | Performed by: INTERNAL MEDICINE

## 2018-01-01 PROCEDURE — 25010000002 SUCCINYLCHOLINE PER 20 MG: Performed by: ANESTHESIOLOGY

## 2018-01-01 PROCEDURE — 82375 ASSAY CARBOXYHB QUANT: CPT | Performed by: NURSE PRACTITIONER

## 2018-01-01 PROCEDURE — 25010000003 POTASSIUM CHLORIDE 10 MEQ/100ML SOLUTION: Performed by: SURGERY

## 2018-01-01 PROCEDURE — 36600 WITHDRAWAL OF ARTERIAL BLOOD: CPT | Performed by: SURGERY

## 2018-01-01 PROCEDURE — 80053 COMPREHEN METABOLIC PANEL: CPT | Performed by: PHYSICIAN ASSISTANT

## 2018-01-01 PROCEDURE — 85025 COMPLETE CBC W/AUTO DIFF WBC: CPT

## 2018-01-01 PROCEDURE — 74022 RADEX COMPL AQT ABD SERIES: CPT

## 2018-01-01 PROCEDURE — 96523 IRRIG DRUG DELIVERY DEVICE: CPT

## 2018-01-01 PROCEDURE — 85025 COMPLETE CBC W/AUTO DIFF WBC: CPT | Performed by: PHYSICIAN ASSISTANT

## 2018-01-01 PROCEDURE — 80053 COMPREHEN METABOLIC PANEL: CPT

## 2018-01-01 PROCEDURE — 36600 WITHDRAWAL OF ARTERIAL BLOOD: CPT | Performed by: EMERGENCY MEDICINE

## 2018-01-01 PROCEDURE — 82805 BLOOD GASES W/O2 SATURATION: CPT | Performed by: SURGERY

## 2018-01-01 PROCEDURE — 36415 COLL VENOUS BLD VENIPUNCTURE: CPT

## 2018-01-01 PROCEDURE — 0BH17EZ INSERTION OF ENDOTRACHEAL AIRWAY INTO TRACHEA, VIA NATURAL OR ARTIFICIAL OPENING: ICD-10-PCS | Performed by: SURGERY

## 2018-01-01 PROCEDURE — 83605 ASSAY OF LACTIC ACID: CPT | Performed by: EMERGENCY MEDICINE

## 2018-01-01 PROCEDURE — 25010000002 FUROSEMIDE PER 20 MG: Performed by: FAMILY MEDICINE

## 2018-01-01 PROCEDURE — 82375 ASSAY CARBOXYHB QUANT: CPT | Performed by: SURGERY

## 2018-01-01 PROCEDURE — 25010000002 ONDANSETRON PER 1 MG: Performed by: SURGERY

## 2018-01-01 PROCEDURE — 86140 C-REACTIVE PROTEIN: CPT | Performed by: SURGERY

## 2018-01-01 PROCEDURE — 87205 SMEAR GRAM STAIN: CPT | Performed by: SURGERY

## 2018-01-01 PROCEDURE — 96374 THER/PROPH/DIAG INJ IV PUSH: CPT

## 2018-01-01 PROCEDURE — 25010000002 ONDANSETRON PER 1 MG: Performed by: ANESTHESIOLOGY

## 2018-01-01 PROCEDURE — 85025 COMPLETE CBC W/AUTO DIFF WBC: CPT | Performed by: INTERNAL MEDICINE

## 2018-01-01 PROCEDURE — 25010000002 PIPERACILLIN-TAZOBACTAM: Performed by: SURGERY

## 2018-01-01 PROCEDURE — 83050 HGB METHEMOGLOBIN QUAN: CPT | Performed by: SURGERY

## 2018-01-01 PROCEDURE — 36600 WITHDRAWAL OF ARTERIAL BLOOD: CPT | Performed by: INTERNAL MEDICINE

## 2018-01-01 PROCEDURE — 25010000002 HEPARIN FLUSH (PORCINE) 100 UNIT/ML SOLUTION

## 2018-01-01 PROCEDURE — 25010000002 ONDANSETRON PER 1 MG: Performed by: PHYSICIAN ASSISTANT

## 2018-01-01 PROCEDURE — 5A1945Z RESPIRATORY VENTILATION, 24-96 CONSECUTIVE HOURS: ICD-10-PCS | Performed by: SURGERY

## 2018-01-01 PROCEDURE — 25010000002 PHENYLEPHRINE PER 1 ML: Performed by: ANESTHESIOLOGY

## 2018-01-01 PROCEDURE — 87040 BLOOD CULTURE FOR BACTERIA: CPT | Performed by: SURGERY

## 2018-01-01 PROCEDURE — 25010000002 MORPHINE PER 10 MG

## 2018-01-01 PROCEDURE — 25010000002 DEXAMETHASONE PER 1 MG: Performed by: ANESTHESIOLOGY

## 2018-01-01 PROCEDURE — 94002 VENT MGMT INPAT INIT DAY: CPT

## 2018-01-01 PROCEDURE — 25010000002 DIGOXIN PER 500 MCG: Performed by: ANESTHESIOLOGY

## 2018-01-01 RX ORDER — SODIUM CHLORIDE 0.9 % (FLUSH) 0.9 %
5 SYRINGE (ML) INJECTION AS NEEDED
Status: DISCONTINUED | OUTPATIENT
Start: 2018-01-01 | End: 2018-01-01 | Stop reason: HOSPADM

## 2018-01-01 RX ORDER — SODIUM CHLORIDE 9 MG/ML
125 INJECTION, SOLUTION INTRAVENOUS CONTINUOUS
Status: DISCONTINUED | OUTPATIENT
Start: 2018-01-01 | End: 2018-01-01 | Stop reason: HOSPADM

## 2018-01-01 RX ORDER — SODIUM CHLORIDE 9 MG/ML
125 INJECTION, SOLUTION INTRAVENOUS CONTINUOUS
Status: DISCONTINUED | OUTPATIENT
Start: 2018-01-01 | End: 2018-01-01

## 2018-01-01 RX ORDER — CHLORHEXIDINE GLUCONATE 0.12 MG/ML
15 RINSE ORAL EVERY 12 HOURS SCHEDULED
Status: DISCONTINUED | OUTPATIENT
Start: 2018-01-01 | End: 2018-01-01

## 2018-01-01 RX ORDER — METRONIDAZOLE 250 MG/1
500 TABLET ORAL EVERY 8 HOURS SCHEDULED
Status: DISCONTINUED | OUTPATIENT
Start: 2018-01-01 | End: 2018-01-01

## 2018-01-01 RX ORDER — POLYETHYLENE GLYCOL 3350 17 G/17G
17 POWDER, FOR SOLUTION ORAL 3 TIMES DAILY PRN
COMMUNITY

## 2018-01-01 RX ORDER — SODIUM CHLORIDE 0.9 % (FLUSH) 0.9 %
10 SYRINGE (ML) INJECTION AS NEEDED
Status: CANCELLED | OUTPATIENT
Start: 2018-01-01

## 2018-01-01 RX ORDER — MORPHINE SULFATE 2 MG/ML
2 INJECTION, SOLUTION INTRAMUSCULAR; INTRAVENOUS
Status: DISCONTINUED | OUTPATIENT
Start: 2018-01-01 | End: 2018-01-01

## 2018-01-01 RX ORDER — POLYETHYLENE GLYCOL 3350 17 G/17G
17 POWDER, FOR SOLUTION ORAL 3 TIMES DAILY PRN
Status: CANCELLED | OUTPATIENT
Start: 2018-01-01

## 2018-01-01 RX ORDER — MAGNESIUM HYDROXIDE 1200 MG/15ML
LIQUID ORAL AS NEEDED
Status: DISCONTINUED | OUTPATIENT
Start: 2018-01-01 | End: 2018-01-01 | Stop reason: HOSPADM

## 2018-01-01 RX ORDER — ONDANSETRON 2 MG/ML
INJECTION INTRAMUSCULAR; INTRAVENOUS AS NEEDED
Status: DISCONTINUED | OUTPATIENT
Start: 2018-01-01 | End: 2018-01-01 | Stop reason: SURG

## 2018-01-01 RX ORDER — SODIUM CHLORIDE, SODIUM LACTATE, POTASSIUM CHLORIDE, CALCIUM CHLORIDE 600; 310; 30; 20 MG/100ML; MG/100ML; MG/100ML; MG/100ML
1000 INJECTION, SOLUTION INTRAVENOUS CONTINUOUS
Status: ACTIVE | OUTPATIENT
Start: 2018-01-01 | End: 2018-01-01

## 2018-01-01 RX ORDER — HALOPERIDOL 5 MG/ML
1 INJECTION INTRAMUSCULAR EVERY 4 HOURS PRN
Status: DISCONTINUED | OUTPATIENT
Start: 2018-01-01 | End: 2018-01-01 | Stop reason: HOSPADM

## 2018-01-01 RX ORDER — PREGABALIN 50 MG/1
50 CAPSULE ORAL 3 TIMES DAILY
Qty: 90 CAPSULE | Refills: 3 | Status: ON HOLD | OUTPATIENT
Start: 2018-01-01 | End: 2018-01-01

## 2018-01-01 RX ORDER — ONDANSETRON 2 MG/ML
4 INJECTION INTRAMUSCULAR; INTRAVENOUS ONCE AS NEEDED
Status: DISCONTINUED | OUTPATIENT
Start: 2018-01-01 | End: 2018-01-01 | Stop reason: HOSPADM

## 2018-01-01 RX ORDER — NAPROXEN 250 MG/1
500 TABLET ORAL 2 TIMES DAILY WITH MEALS
Status: CANCELLED | OUTPATIENT
Start: 2018-01-01

## 2018-01-01 RX ORDER — SODIUM CHLORIDE, SODIUM LACTATE, POTASSIUM CHLORIDE, CALCIUM CHLORIDE 600; 310; 30; 20 MG/100ML; MG/100ML; MG/100ML; MG/100ML
125 INJECTION, SOLUTION INTRAVENOUS CONTINUOUS
Status: DISCONTINUED | OUTPATIENT
Start: 2018-01-01 | End: 2018-01-01

## 2018-01-01 RX ORDER — FLUOXETINE HYDROCHLORIDE 20 MG/1
20 CAPSULE ORAL DAILY
Status: DISCONTINUED | OUTPATIENT
Start: 2018-01-01 | End: 2018-01-01 | Stop reason: HOSPADM

## 2018-01-01 RX ORDER — HYDROCODONE BITARTRATE AND ACETAMINOPHEN 10; 325 MG/1; MG/1
1 TABLET ORAL EVERY 6 HOURS PRN
Status: ON HOLD | COMMUNITY
End: 2018-01-01

## 2018-01-01 RX ORDER — PREGABALIN 100 MG/1
100 CAPSULE ORAL EVERY 12 HOURS SCHEDULED
Status: DISCONTINUED | OUTPATIENT
Start: 2018-01-01 | End: 2018-01-01 | Stop reason: HOSPADM

## 2018-01-01 RX ORDER — ONDANSETRON 2 MG/ML
4 INJECTION INTRAMUSCULAR; INTRAVENOUS EVERY 6 HOURS PRN
Status: DISCONTINUED | OUTPATIENT
Start: 2018-01-01 | End: 2018-01-01 | Stop reason: HOSPADM

## 2018-01-01 RX ORDER — SODIUM CHLORIDE 0.9 % (FLUSH) 0.9 %
10 SYRINGE (ML) INJECTION AS NEEDED
Status: DISCONTINUED | OUTPATIENT
Start: 2018-01-01 | End: 2018-01-01 | Stop reason: HOSPADM

## 2018-01-01 RX ORDER — ACETAMINOPHEN 325 MG/1
650 TABLET ORAL EVERY 4 HOURS PRN
Status: DISCONTINUED | OUTPATIENT
Start: 2018-01-01 | End: 2018-01-01 | Stop reason: HOSPADM

## 2018-01-01 RX ORDER — PREGABALIN 100 MG/1
100 CAPSULE ORAL 3 TIMES DAILY
Status: CANCELLED | OUTPATIENT
Start: 2018-01-01

## 2018-01-01 RX ORDER — CEFDINIR 300 MG/1
300 CAPSULE ORAL
Status: DISCONTINUED | OUTPATIENT
Start: 2018-01-01 | End: 2018-01-01

## 2018-01-01 RX ORDER — FLUOXETINE HYDROCHLORIDE 20 MG/1
20 CAPSULE ORAL DAILY
COMMUNITY

## 2018-01-01 RX ORDER — CALCIUM CARBONATE 500(1250)
500 TABLET ORAL DAILY
Status: CANCELLED | OUTPATIENT
Start: 2018-01-01

## 2018-01-01 RX ORDER — PREGABALIN 100 MG/1
100 CAPSULE ORAL 3 TIMES DAILY
Status: DISCONTINUED | OUTPATIENT
Start: 2018-01-01 | End: 2018-01-01

## 2018-01-01 RX ORDER — SODIUM CHLORIDE, SODIUM LACTATE, POTASSIUM CHLORIDE, CALCIUM CHLORIDE 600; 310; 30; 20 MG/100ML; MG/100ML; MG/100ML; MG/100ML
100 INJECTION, SOLUTION INTRAVENOUS CONTINUOUS
Status: DISCONTINUED | OUTPATIENT
Start: 2018-01-01 | End: 2018-01-01

## 2018-01-01 RX ORDER — ACETAMINOPHEN 650 MG/1
650 SUPPOSITORY RECTAL EVERY 4 HOURS PRN
Status: DISCONTINUED | OUTPATIENT
Start: 2018-01-01 | End: 2018-01-01 | Stop reason: HOSPADM

## 2018-01-01 RX ORDER — MORPHINE SULFATE 10 MG/ML
INJECTION, SOLUTION INTRAMUSCULAR; INTRAVENOUS AS NEEDED
Status: DISCONTINUED | OUTPATIENT
Start: 2018-01-01 | End: 2018-01-01 | Stop reason: SURG

## 2018-01-01 RX ORDER — FLUOXETINE 10 MG/1
10 CAPSULE ORAL DAILY
Status: CANCELLED | OUTPATIENT
Start: 2018-01-01

## 2018-01-01 RX ORDER — IPRATROPIUM BROMIDE AND ALBUTEROL SULFATE 2.5; .5 MG/3ML; MG/3ML
3 SOLUTION RESPIRATORY (INHALATION) ONCE AS NEEDED
Status: DISCONTINUED | OUTPATIENT
Start: 2018-01-01 | End: 2018-01-01 | Stop reason: HOSPADM

## 2018-01-01 RX ORDER — FLUOXETINE 10 MG/1
10 CAPSULE ORAL DAILY
Status: DISCONTINUED | OUTPATIENT
Start: 2018-01-01 | End: 2018-01-01 | Stop reason: HOSPADM

## 2018-01-01 RX ORDER — POTASSIUM CHLORIDE 7.45 MG/ML
10 INJECTION INTRAVENOUS
Status: DISCONTINUED | OUTPATIENT
Start: 2018-01-01 | End: 2018-01-01 | Stop reason: HOSPADM

## 2018-01-01 RX ORDER — FENTANYL 12 UG/H
1 PATCH TRANSDERMAL
Status: DISCONTINUED | OUTPATIENT
Start: 2018-01-01 | End: 2018-01-01 | Stop reason: HOSPADM

## 2018-01-01 RX ORDER — HEPARIN SODIUM 5000 [USP'U]/ML
5000 INJECTION, SOLUTION INTRAVENOUS; SUBCUTANEOUS EVERY 8 HOURS SCHEDULED
Status: DISCONTINUED | OUTPATIENT
Start: 2018-01-01 | End: 2018-01-01 | Stop reason: HOSPADM

## 2018-01-01 RX ORDER — POTASSIUM CHLORIDE 20 MEQ/1
40 TABLET, EXTENDED RELEASE ORAL EVERY 4 HOURS
Status: COMPLETED | OUTPATIENT
Start: 2018-01-01 | End: 2018-01-01

## 2018-01-01 RX ORDER — SODIUM CHLORIDE 0.9 % (FLUSH) 0.9 %
1-10 SYRINGE (ML) INJECTION AS NEEDED
Status: DISCONTINUED | OUTPATIENT
Start: 2018-01-01 | End: 2018-01-01 | Stop reason: HOSPADM

## 2018-01-01 RX ORDER — ROCURONIUM BROMIDE 10 MG/ML
INJECTION, SOLUTION INTRAVENOUS AS NEEDED
Status: DISCONTINUED | OUTPATIENT
Start: 2018-01-01 | End: 2018-01-01 | Stop reason: SURG

## 2018-01-01 RX ORDER — ETOMIDATE 2 MG/ML
INJECTION INTRAVENOUS AS NEEDED
Status: DISCONTINUED | OUTPATIENT
Start: 2018-01-01 | End: 2018-01-01 | Stop reason: SURG

## 2018-01-01 RX ORDER — MORPHINE SULFATE 2 MG/ML
INJECTION, SOLUTION INTRAMUSCULAR; INTRAVENOUS
Status: COMPLETED
Start: 2018-01-01 | End: 2018-01-01

## 2018-01-01 RX ORDER — ONDANSETRON 4 MG/1
4 TABLET, ORALLY DISINTEGRATING ORAL EVERY 6 HOURS PRN
Status: DISCONTINUED | OUTPATIENT
Start: 2018-01-01 | End: 2018-01-01 | Stop reason: HOSPADM

## 2018-01-01 RX ORDER — FUROSEMIDE 10 MG/ML
20 INJECTION INTRAMUSCULAR; INTRAVENOUS ONCE
Status: COMPLETED | OUTPATIENT
Start: 2018-01-01 | End: 2018-01-01

## 2018-01-01 RX ORDER — POTASSIUM CHLORIDE 20 MEQ/1
40 TABLET, EXTENDED RELEASE ORAL AS NEEDED
Status: DISCONTINUED | OUTPATIENT
Start: 2018-01-01 | End: 2018-01-01 | Stop reason: HOSPADM

## 2018-01-01 RX ORDER — PANTOPRAZOLE SODIUM 40 MG/10ML
40 INJECTION, POWDER, LYOPHILIZED, FOR SOLUTION INTRAVENOUS
Status: DISCONTINUED | OUTPATIENT
Start: 2018-01-01 | End: 2018-01-01 | Stop reason: HOSPADM

## 2018-01-01 RX ORDER — FLUOXETINE HYDROCHLORIDE 20 MG/1
20 CAPSULE ORAL DAILY
Status: CANCELLED | OUTPATIENT
Start: 2018-01-01

## 2018-01-01 RX ORDER — HYDROCODONE BITARTRATE AND ACETAMINOPHEN 10; 325 MG/1; MG/1
1 TABLET ORAL 3 TIMES DAILY
Status: CANCELLED | OUTPATIENT
Start: 2018-01-01

## 2018-01-01 RX ORDER — LACTULOSE 10 G/15ML
20 SOLUTION ORAL ONCE
Status: COMPLETED | OUTPATIENT
Start: 2018-01-01 | End: 2018-01-01

## 2018-01-01 RX ORDER — OXYCODONE HYDROCHLORIDE AND ACETAMINOPHEN 5; 325 MG/1; MG/1
1 TABLET ORAL ONCE AS NEEDED
Status: DISCONTINUED | OUTPATIENT
Start: 2018-01-01 | End: 2018-01-01 | Stop reason: HOSPADM

## 2018-01-01 RX ORDER — MEPERIDINE HYDROCHLORIDE 50 MG/ML
12.5 INJECTION INTRAMUSCULAR; INTRAVENOUS; SUBCUTANEOUS
Status: DISCONTINUED | OUTPATIENT
Start: 2018-01-01 | End: 2018-01-01 | Stop reason: HOSPADM

## 2018-01-01 RX ORDER — SPIRONOLACTONE 25 MG/1
25 TABLET ORAL DAILY
Status: DISCONTINUED | OUTPATIENT
Start: 2018-01-01 | End: 2018-01-01

## 2018-01-01 RX ORDER — SODIUM CHLORIDE 0.9 % (FLUSH) 0.9 %
3 SYRINGE (ML) INJECTION EVERY 8 HOURS
Status: DISCONTINUED | OUTPATIENT
Start: 2018-01-01 | End: 2018-01-01 | Stop reason: HOSPADM

## 2018-01-01 RX ORDER — HYDROCODONE BITARTRATE AND ACETAMINOPHEN 10; 325 MG/1; MG/1
1 TABLET ORAL 3 TIMES DAILY
COMMUNITY

## 2018-01-01 RX ORDER — FENTANYL CITRATE 50 UG/ML
50 INJECTION, SOLUTION INTRAMUSCULAR; INTRAVENOUS
Status: DISCONTINUED | OUTPATIENT
Start: 2018-01-01 | End: 2018-01-01 | Stop reason: HOSPADM

## 2018-01-01 RX ORDER — ONDANSETRON 2 MG/ML
4 INJECTION INTRAMUSCULAR; INTRAVENOUS ONCE
Status: COMPLETED | OUTPATIENT
Start: 2018-01-01 | End: 2018-01-01

## 2018-01-01 RX ORDER — LABETALOL HYDROCHLORIDE 5 MG/ML
10 INJECTION, SOLUTION INTRAVENOUS EVERY 4 HOURS PRN
Status: DISCONTINUED | OUTPATIENT
Start: 2018-01-01 | End: 2018-01-01 | Stop reason: HOSPADM

## 2018-01-01 RX ORDER — POTASSIUM CHLORIDE 1.5 G/1.77G
40 POWDER, FOR SOLUTION ORAL AS NEEDED
Status: DISCONTINUED | OUTPATIENT
Start: 2018-01-01 | End: 2018-01-01 | Stop reason: HOSPADM

## 2018-01-01 RX ORDER — FENTANYL CITRATE 50 UG/ML
INJECTION, SOLUTION INTRAMUSCULAR; INTRAVENOUS AS NEEDED
Status: DISCONTINUED | OUTPATIENT
Start: 2018-01-01 | End: 2018-01-01 | Stop reason: SURG

## 2018-01-01 RX ORDER — HYDRALAZINE HYDROCHLORIDE 20 MG/ML
5 INJECTION INTRAMUSCULAR; INTRAVENOUS EVERY 6 HOURS PRN
Status: DISCONTINUED | OUTPATIENT
Start: 2018-01-01 | End: 2018-01-01

## 2018-01-01 RX ORDER — NITROFURANTOIN 25; 75 MG/1; MG/1
100 CAPSULE ORAL DAILY
Status: CANCELLED | OUTPATIENT
Start: 2018-01-01 | End: 2018-08-29

## 2018-01-01 RX ORDER — SUCCINYLCHOLINE CHLORIDE 20 MG/ML
INJECTION INTRAMUSCULAR; INTRAVENOUS AS NEEDED
Status: DISCONTINUED | OUTPATIENT
Start: 2018-01-01 | End: 2018-01-01 | Stop reason: SURG

## 2018-01-01 RX ORDER — HYDRALAZINE HYDROCHLORIDE 20 MG/ML
10 INJECTION INTRAMUSCULAR; INTRAVENOUS EVERY 4 HOURS PRN
Status: DISCONTINUED | OUTPATIENT
Start: 2018-01-01 | End: 2018-01-01 | Stop reason: HOSPADM

## 2018-01-01 RX ORDER — SODIUM CHLORIDE, SODIUM LACTATE, POTASSIUM CHLORIDE, CALCIUM CHLORIDE 600; 310; 30; 20 MG/100ML; MG/100ML; MG/100ML; MG/100ML
INJECTION, SOLUTION INTRAVENOUS
Status: COMPLETED
Start: 2018-01-01 | End: 2018-01-01

## 2018-01-01 RX ORDER — FAMOTIDINE 10 MG/ML
INJECTION, SOLUTION INTRAVENOUS AS NEEDED
Status: DISCONTINUED | OUTPATIENT
Start: 2018-01-01 | End: 2018-01-01 | Stop reason: SURG

## 2018-01-01 RX ORDER — SPIRONOLACTONE 25 MG/1
25 TABLET ORAL
Status: CANCELLED | OUTPATIENT
Start: 2018-01-01

## 2018-01-01 RX ORDER — PANTOPRAZOLE SODIUM 40 MG/1
40 TABLET, DELAYED RELEASE ORAL EVERY MORNING
Status: CANCELLED | OUTPATIENT
Start: 2018-01-01

## 2018-01-01 RX ORDER — GLYCOPYRROLATE 0.2 MG/ML
0.4 INJECTION INTRAMUSCULAR; INTRAVENOUS EVERY 4 HOURS
Status: DISCONTINUED | OUTPATIENT
Start: 2018-01-01 | End: 2018-01-01 | Stop reason: HOSPADM

## 2018-01-01 RX ORDER — POTASSIUM CHLORIDE 7.45 MG/ML
10 INJECTION INTRAVENOUS
Status: COMPLETED | OUTPATIENT
Start: 2018-01-01 | End: 2018-01-01

## 2018-01-01 RX ORDER — PREGABALIN 100 MG/1
100 CAPSULE ORAL 3 TIMES DAILY
COMMUNITY

## 2018-01-01 RX ORDER — ONDANSETRON 4 MG/1
4 TABLET, FILM COATED ORAL EVERY 6 HOURS PRN
Status: DISCONTINUED | OUTPATIENT
Start: 2018-01-01 | End: 2018-01-01 | Stop reason: HOSPADM

## 2018-01-01 RX ORDER — CEFDINIR 300 MG/1
300 CAPSULE ORAL EVERY 12 HOURS SCHEDULED
Status: DISCONTINUED | OUTPATIENT
Start: 2018-01-01 | End: 2018-01-01 | Stop reason: HOSPADM

## 2018-01-01 RX ORDER — PHENOL 1.4 %
600 AEROSOL, SPRAY (ML) MUCOUS MEMBRANE DAILY
COMMUNITY

## 2018-01-01 RX ORDER — DEXAMETHASONE SODIUM PHOSPHATE 4 MG/ML
INJECTION, SOLUTION INTRA-ARTICULAR; INTRALESIONAL; INTRAMUSCULAR; INTRAVENOUS; SOFT TISSUE AS NEEDED
Status: DISCONTINUED | OUTPATIENT
Start: 2018-01-01 | End: 2018-01-01 | Stop reason: SURG

## 2018-01-01 RX ORDER — METOPROLOL TARTRATE 5 MG/5ML
INJECTION INTRAVENOUS AS NEEDED
Status: DISCONTINUED | OUTPATIENT
Start: 2018-01-01 | End: 2018-01-01 | Stop reason: SURG

## 2018-01-01 RX ORDER — DIGOXIN 0.25 MG/ML
INJECTION INTRAMUSCULAR; INTRAVENOUS AS NEEDED
Status: DISCONTINUED | OUTPATIENT
Start: 2018-01-01 | End: 2018-01-01 | Stop reason: SURG

## 2018-01-01 RX ORDER — NITROFURANTOIN 25; 75 MG/1; MG/1
100 CAPSULE ORAL DAILY
COMMUNITY

## 2018-01-01 RX ADMIN — CHLORHEXIDINE GLUCONATE 15 ML: 1.2 RINSE ORAL at 02:05

## 2018-01-01 RX ADMIN — PIPERACILLIN SODIUM,TAZOBACTAM SODIUM 3.38 G: 3; .375 INJECTION, POWDER, FOR SOLUTION INTRAVENOUS at 03:07

## 2018-01-01 RX ADMIN — METRONIDAZOLE 500 MG: 250 TABLET ORAL at 05:22

## 2018-01-01 RX ADMIN — METRONIDAZOLE 500 MG: 500 INJECTION, SOLUTION INTRAVENOUS at 14:26

## 2018-01-01 RX ADMIN — NOREPINEPHRINE BITARTRATE 0.02 MCG/KG/MIN: 1 INJECTION INTRAVENOUS at 13:55

## 2018-01-01 RX ADMIN — PANTOPRAZOLE SODIUM 40 MG: 40 INJECTION, POWDER, FOR SOLUTION INTRAVENOUS at 05:05

## 2018-01-01 RX ADMIN — POTASSIUM CHLORIDE 10 MEQ: 10 INJECTION, SOLUTION INTRAVENOUS at 08:25

## 2018-01-01 RX ADMIN — CEFDINIR 300 MG: 300 CAPSULE ORAL at 15:03

## 2018-01-01 RX ADMIN — PROPOFOL 5 MCG/KG/MIN: 10 INJECTION, EMULSION INTRAVENOUS at 18:29

## 2018-01-01 RX ADMIN — PIPERACILLIN SODIUM,TAZOBACTAM SODIUM 3.38 G: 3; .375 INJECTION, POWDER, FOR SOLUTION INTRAVENOUS at 12:41

## 2018-01-01 RX ADMIN — PIPERACILLIN SODIUM,TAZOBACTAM SODIUM 3.38 G: 3; .375 INJECTION, POWDER, FOR SOLUTION INTRAVENOUS at 16:24

## 2018-01-01 RX ADMIN — HYDRALAZINE HYDROCHLORIDE 10 MG: 20 INJECTION INTRAMUSCULAR; INTRAVENOUS at 09:22

## 2018-01-01 RX ADMIN — SODIUM CHLORIDE, PRESERVATIVE FREE 500 UNITS: 5 INJECTION INTRAVENOUS at 10:05

## 2018-01-01 RX ADMIN — FLUOXETINE 10 MG: 20 CAPSULE ORAL at 08:52

## 2018-01-01 RX ADMIN — Medication 10 ML: at 10:00

## 2018-01-01 RX ADMIN — HEPARIN SODIUM 5000 UNITS: 5000 INJECTION, SOLUTION INTRAVENOUS; SUBCUTANEOUS at 14:25

## 2018-01-01 RX ADMIN — HEPARIN SODIUM 5000 UNITS: 5000 INJECTION, SOLUTION INTRAVENOUS; SUBCUTANEOUS at 06:31

## 2018-01-01 RX ADMIN — MORPHINE SULFATE 2 MG: 2 INJECTION, SOLUTION INTRAMUSCULAR; INTRAVENOUS at 05:04

## 2018-01-01 RX ADMIN — MORPHINE SULFATE 3 MG: 4 INJECTION, SOLUTION INTRAMUSCULAR; INTRAVENOUS at 06:31

## 2018-01-01 RX ADMIN — HEPARIN SODIUM 5000 UNITS: 5000 INJECTION, SOLUTION INTRAVENOUS; SUBCUTANEOUS at 21:30

## 2018-01-01 RX ADMIN — HYDRALAZINE HYDROCHLORIDE 5 MG: 20 INJECTION INTRAMUSCULAR; INTRAVENOUS at 08:25

## 2018-01-01 RX ADMIN — HYDRALAZINE HYDROCHLORIDE 10 MG: 20 INJECTION INTRAMUSCULAR; INTRAVENOUS at 09:46

## 2018-01-01 RX ADMIN — HEPARIN SODIUM 5000 UNITS: 5000 INJECTION, SOLUTION INTRAVENOUS; SUBCUTANEOUS at 06:07

## 2018-01-01 RX ADMIN — DIGOXIN 0.5 MG: 0.25 INJECTION INTRAMUSCULAR; INTRAVENOUS at 22:05

## 2018-01-01 RX ADMIN — ONDANSETRON HYDROCHLORIDE 4 MG: 2 INJECTION, SOLUTION INTRAMUSCULAR; INTRAVENOUS at 21:47

## 2018-01-01 RX ADMIN — HYDRALAZINE HYDROCHLORIDE 5 MG: 20 INJECTION INTRAMUSCULAR; INTRAVENOUS at 03:43

## 2018-01-01 RX ADMIN — METRONIDAZOLE 500 MG: 500 INJECTION, SOLUTION INTRAVENOUS at 06:18

## 2018-01-01 RX ADMIN — HYDRALAZINE HYDROCHLORIDE 5 MG: 20 INJECTION INTRAMUSCULAR; INTRAVENOUS at 17:23

## 2018-01-01 RX ADMIN — POTASSIUM CHLORIDE 40 MEQ: 1500 TABLET, EXTENDED RELEASE ORAL at 15:03

## 2018-01-01 RX ADMIN — CHLORHEXIDINE GLUCONATE 15 ML: 1.2 RINSE ORAL at 20:42

## 2018-01-01 RX ADMIN — MORPHINE SULFATE 3 MG: 4 INJECTION, SOLUTION INTRAMUSCULAR; INTRAVENOUS at 09:02

## 2018-01-01 RX ADMIN — Medication 3 ML: at 17:49

## 2018-01-01 RX ADMIN — PREGABALIN 100 MG: 100 CAPSULE ORAL at 08:51

## 2018-01-01 RX ADMIN — MORPHINE SULFATE 8 MG: 10 INJECTION, SOLUTION INTRAMUSCULAR; INTRAVENOUS at 20:06

## 2018-01-01 RX ADMIN — METRONIDAZOLE 500 MG: 500 INJECTION, SOLUTION INTRAVENOUS at 16:33

## 2018-01-01 RX ADMIN — MORPHINE SULFATE 3 MG: 4 INJECTION, SOLUTION INTRAMUSCULAR; INTRAVENOUS at 21:47

## 2018-01-01 RX ADMIN — MORPHINE SULFATE 2 MG: 2 INJECTION, SOLUTION INTRAMUSCULAR; INTRAVENOUS at 22:43

## 2018-01-01 RX ADMIN — HEPARIN SODIUM (PORCINE) LOCK FLUSH IV SOLN 100 UNIT/ML 500 UNITS: 100 SOLUTION at 11:21

## 2018-01-01 RX ADMIN — POTASSIUM CHLORIDE 40 MEQ: 1500 TABLET, EXTENDED RELEASE ORAL at 18:07

## 2018-01-01 RX ADMIN — ACETAMINOPHEN 650 MG: 650 SUPPOSITORY RECTAL at 18:13

## 2018-01-01 RX ADMIN — ROCURONIUM BROMIDE 20 MG: 10 INJECTION INTRAVENOUS at 20:45

## 2018-01-01 RX ADMIN — SODIUM CHLORIDE, POTASSIUM CHLORIDE, SODIUM LACTATE AND CALCIUM CHLORIDE 1000 ML: 600; 310; 30; 20 INJECTION, SOLUTION INTRAVENOUS at 07:13

## 2018-01-01 RX ADMIN — FLUOXETINE 20 MG: 20 CAPSULE ORAL at 08:52

## 2018-01-01 RX ADMIN — HEPARIN SODIUM (PORCINE) LOCK FLUSH IV SOLN 100 UNIT/ML 500 UNITS: 100 SOLUTION at 10:00

## 2018-01-01 RX ADMIN — Medication: at 00:51

## 2018-01-01 RX ADMIN — SODIUM CHLORIDE 125 ML/HR: 9 INJECTION, SOLUTION INTRAVENOUS at 13:27

## 2018-01-01 RX ADMIN — Medication 10 ML: at 13:18

## 2018-01-01 RX ADMIN — PANTOPRAZOLE SODIUM 40 MG: 40 INJECTION, POWDER, FOR SOLUTION INTRAVENOUS at 06:31

## 2018-01-01 RX ADMIN — PIPERACILLIN SODIUM,TAZOBACTAM SODIUM 3.38 G: 3; .375 INJECTION, POWDER, FOR SOLUTION INTRAVENOUS at 20:45

## 2018-01-01 RX ADMIN — FENTANYL CITRATE 100 MCG: 50 INJECTION, SOLUTION INTRAMUSCULAR; INTRAVENOUS at 19:59

## 2018-01-01 RX ADMIN — MORPHINE SULFATE 3 MG: 4 INJECTION, SOLUTION INTRAMUSCULAR; INTRAVENOUS at 17:56

## 2018-01-01 RX ADMIN — Medication 3 ML: at 17:56

## 2018-01-01 RX ADMIN — PANTOPRAZOLE SODIUM 40 MG: 40 INJECTION, POWDER, FOR SOLUTION INTRAVENOUS at 05:22

## 2018-01-01 RX ADMIN — SUCCINYLCHOLINE CHLORIDE 74.6 MG: 20 INJECTION, SOLUTION INTRAMUSCULAR; INTRAVENOUS at 19:57

## 2018-01-01 RX ADMIN — CHLORHEXIDINE GLUCONATE 15 ML: 1.2 RINSE ORAL at 21:47

## 2018-01-01 RX ADMIN — HEPARIN SODIUM 5000 UNITS: 5000 INJECTION, SOLUTION INTRAVENOUS; SUBCUTANEOUS at 14:52

## 2018-01-01 RX ADMIN — CHLORHEXIDINE GLUCONATE 15 ML: 1.2 RINSE ORAL at 09:48

## 2018-01-01 RX ADMIN — HEPARIN SODIUM 5000 UNITS: 5000 INJECTION, SOLUTION INTRAVENOUS; SUBCUTANEOUS at 05:22

## 2018-01-01 RX ADMIN — Medication 3 ML: at 02:30

## 2018-01-01 RX ADMIN — ACETAMINOPHEN 650 MG: 650 SUPPOSITORY RECTAL at 18:42

## 2018-01-01 RX ADMIN — SODIUM BICARBONATE 125 ML/HR: 84 INJECTION, SOLUTION INTRAVENOUS at 03:07

## 2018-01-01 RX ADMIN — METOPROLOL TARTRATE 2 MG: 1 INJECTION, SOLUTION INTRAVENOUS at 22:10

## 2018-01-01 RX ADMIN — ROCURONIUM BROMIDE 30 MG: 10 INJECTION INTRAVENOUS at 20:00

## 2018-01-01 RX ADMIN — MORPHINE SULFATE 3 MG: 4 INJECTION, SOLUTION INTRAMUSCULAR; INTRAVENOUS at 04:53

## 2018-01-01 RX ADMIN — HEPARIN SODIUM 5000 UNITS: 5000 INJECTION, SOLUTION INTRAVENOUS; SUBCUTANEOUS at 15:03

## 2018-01-01 RX ADMIN — MORPHINE SULFATE 3 MG: 4 INJECTION, SOLUTION INTRAMUSCULAR; INTRAVENOUS at 13:05

## 2018-01-01 RX ADMIN — MORPHINE SULFATE 3 MG: 4 INJECTION, SOLUTION INTRAMUSCULAR; INTRAVENOUS at 02:13

## 2018-01-01 RX ADMIN — CHLORHEXIDINE GLUCONATE 15 ML: 1.2 RINSE ORAL at 08:26

## 2018-01-01 RX ADMIN — SODIUM CHLORIDE, PRESERVATIVE FREE 500 UNITS: 5 INJECTION INTRAVENOUS at 10:20

## 2018-01-01 RX ADMIN — LACTULOSE 20 G: 10 SOLUTION ORAL at 13:27

## 2018-01-01 RX ADMIN — FLUOXETINE 20 MG: 20 CAPSULE ORAL at 09:33

## 2018-01-01 RX ADMIN — Medication 3 ML: at 09:43

## 2018-01-01 RX ADMIN — HEPARIN SODIUM 5000 UNITS: 5000 INJECTION, SOLUTION INTRAVENOUS; SUBCUTANEOUS at 00:51

## 2018-01-01 RX ADMIN — ROCURONIUM BROMIDE 20 MG: 10 INJECTION INTRAVENOUS at 21:15

## 2018-01-01 RX ADMIN — METRONIDAZOLE 500 MG: 500 INJECTION, SOLUTION INTRAVENOUS at 05:05

## 2018-01-01 RX ADMIN — MORPHINE SULFATE 2 MG: 2 INJECTION, SOLUTION INTRAMUSCULAR; INTRAVENOUS at 10:43

## 2018-01-01 RX ADMIN — MORPHINE SULFATE 3 MG: 4 INJECTION, SOLUTION INTRAMUSCULAR; INTRAVENOUS at 23:47

## 2018-01-01 RX ADMIN — SODIUM CHLORIDE, POTASSIUM CHLORIDE, SODIUM LACTATE AND CALCIUM CHLORIDE: 600; 310; 30; 20 INJECTION, SOLUTION INTRAVENOUS at 19:46

## 2018-01-01 RX ADMIN — PREGABALIN 100 MG: 100 CAPSULE ORAL at 20:42

## 2018-01-01 RX ADMIN — HEPARIN SODIUM 5000 UNITS: 5000 INJECTION, SOLUTION INTRAVENOUS; SUBCUTANEOUS at 15:08

## 2018-01-01 RX ADMIN — FENTANYL 1 PATCH: 12 PATCH TRANSDERMAL at 16:10

## 2018-01-01 RX ADMIN — CHLORHEXIDINE GLUCONATE 15 ML: 1.2 RINSE ORAL at 20:44

## 2018-01-01 RX ADMIN — METRONIDAZOLE 500 MG: 500 INJECTION, SOLUTION INTRAVENOUS at 05:40

## 2018-01-01 RX ADMIN — ACETAMINOPHEN 650 MG: 650 SUPPOSITORY RECTAL at 10:38

## 2018-01-01 RX ADMIN — MORPHINE SULFATE 3 MG: 4 INJECTION, SOLUTION INTRAMUSCULAR; INTRAVENOUS at 15:09

## 2018-01-01 RX ADMIN — HEPARIN SODIUM 5000 UNITS: 5000 INJECTION, SOLUTION INTRAVENOUS; SUBCUTANEOUS at 21:00

## 2018-01-01 RX ADMIN — ACETAMINOPHEN 650 MG: 650 SUPPOSITORY RECTAL at 03:43

## 2018-01-01 RX ADMIN — POTASSIUM CHLORIDE 10 MEQ: 10 INJECTION, SOLUTION INTRAVENOUS at 09:41

## 2018-01-01 RX ADMIN — Medication 3 ML: at 09:32

## 2018-01-01 RX ADMIN — CHLORHEXIDINE GLUCONATE 15 ML: 1.2 RINSE ORAL at 08:52

## 2018-01-01 RX ADMIN — ONDANSETRON 4 MG: 2 INJECTION, SOLUTION INTRAMUSCULAR; INTRAVENOUS at 20:33

## 2018-01-01 RX ADMIN — MORPHINE SULFATE 2 MG: 2 INJECTION, SOLUTION INTRAMUSCULAR; INTRAVENOUS at 08:25

## 2018-01-01 RX ADMIN — DEXAMETHASONE SODIUM PHOSPHATE 4 MG: 4 INJECTION, SOLUTION INTRAMUSCULAR; INTRAVENOUS at 20:33

## 2018-01-01 RX ADMIN — MORPHINE SULFATE 2 MG: 2 INJECTION, SOLUTION INTRAMUSCULAR; INTRAVENOUS at 02:34

## 2018-01-01 RX ADMIN — METRONIDAZOLE 500 MG: 500 INJECTION, SOLUTION INTRAVENOUS at 21:47

## 2018-01-01 RX ADMIN — PANTOPRAZOLE SODIUM 40 MG: 40 INJECTION, POWDER, FOR SOLUTION INTRAVENOUS at 06:07

## 2018-01-01 RX ADMIN — SODIUM CHLORIDE 125 ML/HR: 9 INJECTION, SOLUTION INTRAVENOUS at 16:24

## 2018-01-01 RX ADMIN — HYDRALAZINE HYDROCHLORIDE 5 MG: 20 INJECTION INTRAMUSCULAR; INTRAVENOUS at 17:12

## 2018-01-01 RX ADMIN — Medication 3 ML: at 10:34

## 2018-01-01 RX ADMIN — SODIUM BICARBONATE 50 ML/HR: 84 INJECTION, SOLUTION INTRAVENOUS at 18:05

## 2018-01-01 RX ADMIN — PROPOFOL 5 MCG/KG/MIN: 10 INJECTION, EMULSION INTRAVENOUS at 00:50

## 2018-01-01 RX ADMIN — METRONIDAZOLE 500 MG: 250 TABLET ORAL at 15:03

## 2018-01-01 RX ADMIN — ONDANSETRON 4 MG: 2 INJECTION, SOLUTION INTRAMUSCULAR; INTRAVENOUS at 10:31

## 2018-01-01 RX ADMIN — HYDROCORTISONE SODIUM SUCCINATE 100 MG: 100 INJECTION, POWDER, FOR SOLUTION INTRAMUSCULAR; INTRAVENOUS at 21:42

## 2018-01-01 RX ADMIN — METRONIDAZOLE 500 MG: 500 INJECTION, SOLUTION INTRAVENOUS at 06:31

## 2018-01-01 RX ADMIN — SODIUM BICARBONATE 125 ML/HR: 84 INJECTION, SOLUTION INTRAVENOUS at 15:17

## 2018-01-01 RX ADMIN — SODIUM CHLORIDE, PRESERVATIVE FREE 500 UNITS: 5 INJECTION INTRAVENOUS at 13:18

## 2018-01-01 RX ADMIN — Medication 3 ML: at 09:50

## 2018-01-01 RX ADMIN — MORPHINE SULFATE 2 MG: 2 INJECTION, SOLUTION INTRAMUSCULAR; INTRAVENOUS at 12:27

## 2018-01-01 RX ADMIN — HEPARIN SODIUM 5000 UNITS: 5000 INJECTION, SOLUTION INTRAVENOUS; SUBCUTANEOUS at 21:29

## 2018-01-01 RX ADMIN — METRONIDAZOLE 500 MG: 500 INJECTION, SOLUTION INTRAVENOUS at 21:39

## 2018-01-01 RX ADMIN — CHLORHEXIDINE GLUCONATE 15 ML: 1.2 RINSE ORAL at 21:00

## 2018-01-01 RX ADMIN — Medication: at 04:43

## 2018-01-01 RX ADMIN — PANTOPRAZOLE SODIUM 40 MG: 40 INJECTION, POWDER, FOR SOLUTION INTRAVENOUS at 05:26

## 2018-01-01 RX ADMIN — FENTANYL CITRATE 150 MCG: 50 INJECTION, SOLUTION INTRAMUSCULAR; INTRAVENOUS at 19:57

## 2018-01-01 RX ADMIN — SODIUM CHLORIDE 125 ML/HR: 9 INJECTION, SOLUTION INTRAVENOUS at 13:28

## 2018-01-01 RX ADMIN — POTASSIUM CHLORIDE 10 MEQ: 10 INJECTION, SOLUTION INTRAVENOUS at 07:18

## 2018-01-01 RX ADMIN — PIPERACILLIN SODIUM,TAZOBACTAM SODIUM 3.38 G: 3; .375 INJECTION, POWDER, FOR SOLUTION INTRAVENOUS at 02:02

## 2018-01-01 RX ADMIN — HEPARIN SODIUM 5000 UNITS: 5000 INJECTION, SOLUTION INTRAVENOUS; SUBCUTANEOUS at 05:39

## 2018-01-01 RX ADMIN — HYDRALAZINE HYDROCHLORIDE 10 MG: 20 INJECTION INTRAMUSCULAR; INTRAVENOUS at 15:08

## 2018-01-01 RX ADMIN — PROPOFOL 10 MCG/KG/MIN: 10 INJECTION, EMULSION INTRAVENOUS at 04:43

## 2018-01-01 RX ADMIN — HEPARIN SODIUM 5000 UNITS: 5000 INJECTION, SOLUTION INTRAVENOUS; SUBCUTANEOUS at 21:38

## 2018-01-01 RX ADMIN — HEPARIN SODIUM 5000 UNITS: 5000 INJECTION, SOLUTION INTRAVENOUS; SUBCUTANEOUS at 21:47

## 2018-01-01 RX ADMIN — MORPHINE SULFATE 3 MG: 4 INJECTION, SOLUTION INTRAMUSCULAR; INTRAVENOUS at 21:39

## 2018-01-01 RX ADMIN — ROCURONIUM BROMIDE 10 MG: 10 INJECTION INTRAVENOUS at 20:24

## 2018-01-01 RX ADMIN — HEPARIN SODIUM 5000 UNITS: 5000 INJECTION, SOLUTION INTRAVENOUS; SUBCUTANEOUS at 22:00

## 2018-01-01 RX ADMIN — METRONIDAZOLE 500 MG: 500 INJECTION, SOLUTION INTRAVENOUS at 22:26

## 2018-01-01 RX ADMIN — CHLORHEXIDINE GLUCONATE 15 ML: 1.2 RINSE ORAL at 09:32

## 2018-01-01 RX ADMIN — PIPERACILLIN SODIUM,TAZOBACTAM SODIUM 3.38 G: 3; .375 INJECTION, POWDER, FOR SOLUTION INTRAVENOUS at 06:07

## 2018-01-01 RX ADMIN — Medication 3 ML: at 02:03

## 2018-01-01 RX ADMIN — Medication 3 ML: at 02:14

## 2018-01-01 RX ADMIN — MORPHINE SULFATE 3 MG: 4 INJECTION, SOLUTION INTRAMUSCULAR; INTRAVENOUS at 17:47

## 2018-01-01 RX ADMIN — Medication 3 ML: at 02:11

## 2018-01-01 RX ADMIN — MORPHINE SULFATE 3 MG: 4 INJECTION, SOLUTION INTRAMUSCULAR; INTRAVENOUS at 17:43

## 2018-01-01 RX ADMIN — Medication 3 ML: at 09:48

## 2018-01-01 RX ADMIN — MORPHINE SULFATE 3 MG: 4 INJECTION, SOLUTION INTRAMUSCULAR; INTRAVENOUS at 09:22

## 2018-01-01 RX ADMIN — GLYCOPYRROLATE 0.4 MG: 0.2 INJECTION, SOLUTION INTRAMUSCULAR; INTRAVENOUS at 06:03

## 2018-01-01 RX ADMIN — HYDRALAZINE HYDROCHLORIDE 5 MG: 20 INJECTION INTRAMUSCULAR; INTRAVENOUS at 02:02

## 2018-01-01 RX ADMIN — MORPHINE SULFATE 2 MG: 2 INJECTION, SOLUTION INTRAMUSCULAR; INTRAVENOUS at 14:25

## 2018-01-01 RX ADMIN — Medication 10 ML: at 11:20

## 2018-01-01 RX ADMIN — HEPARIN SODIUM 5000 UNITS: 5000 INJECTION, SOLUTION INTRAVENOUS; SUBCUTANEOUS at 05:05

## 2018-01-01 RX ADMIN — SODIUM CHLORIDE 500 ML: 9 INJECTION, SOLUTION INTRAVENOUS at 10:31

## 2018-01-01 RX ADMIN — Medication 10 ML: at 10:05

## 2018-01-01 RX ADMIN — METRONIDAZOLE 500 MG: 500 INJECTION, SOLUTION INTRAVENOUS at 15:09

## 2018-01-01 RX ADMIN — FLUOXETINE 10 MG: 20 CAPSULE ORAL at 09:32

## 2018-01-01 RX ADMIN — CHLORHEXIDINE GLUCONATE 15 ML: 1.2 RINSE ORAL at 09:46

## 2018-01-01 RX ADMIN — METRONIDAZOLE 500 MG: 500 INJECTION, SOLUTION INTRAVENOUS at 14:21

## 2018-01-01 RX ADMIN — PROMETHAZINE HYDROCHLORIDE 6.25 MG: 25 INJECTION INTRAMUSCULAR; INTRAVENOUS at 15:14

## 2018-01-01 RX ADMIN — METRONIDAZOLE 500 MG: 500 INJECTION, SOLUTION INTRAVENOUS at 21:00

## 2018-01-01 RX ADMIN — HEPARIN SODIUM 5000 UNITS: 5000 INJECTION, SOLUTION INTRAVENOUS; SUBCUTANEOUS at 16:52

## 2018-01-01 RX ADMIN — POTASSIUM CHLORIDE 10 MEQ: 10 INJECTION, SOLUTION INTRAVENOUS at 05:33

## 2018-01-01 RX ADMIN — HEPARIN SODIUM 5000 UNITS: 5000 INJECTION, SOLUTION INTRAVENOUS; SUBCUTANEOUS at 05:26

## 2018-01-01 RX ADMIN — SODIUM BICARBONATE 125 ML/HR: 84 INJECTION, SOLUTION INTRAVENOUS at 03:41

## 2018-01-01 RX ADMIN — MORPHINE SULFATE 8 MG: 10 INJECTION, SOLUTION INTRAMUSCULAR; INTRAVENOUS at 20:19

## 2018-01-01 RX ADMIN — MORPHINE SULFATE 3 MG: 4 INJECTION, SOLUTION INTRAMUSCULAR; INTRAVENOUS at 19:36

## 2018-01-01 RX ADMIN — PREGABALIN 100 MG: 100 CAPSULE ORAL at 09:32

## 2018-01-01 RX ADMIN — METRONIDAZOLE 500 MG: 250 TABLET ORAL at 21:29

## 2018-01-01 RX ADMIN — HEPARIN SODIUM 5000 UNITS: 5000 INJECTION, SOLUTION INTRAVENOUS; SUBCUTANEOUS at 14:22

## 2018-01-01 RX ADMIN — ACETAMINOPHEN 650 MG: 650 SUPPOSITORY RECTAL at 06:11

## 2018-01-01 RX ADMIN — PANTOPRAZOLE SODIUM 40 MG: 40 INJECTION, POWDER, FOR SOLUTION INTRAVENOUS at 05:40

## 2018-01-01 RX ADMIN — MORPHINE SULFATE 3 MG: 4 INJECTION, SOLUTION INTRAMUSCULAR; INTRAVENOUS at 03:44

## 2018-01-01 RX ADMIN — HALOPERIDOL LACTATE 1 MG: 5 INJECTION, SOLUTION INTRAMUSCULAR at 17:44

## 2018-01-01 RX ADMIN — ETOMIDATE 10 MG: 2 INJECTION, SOLUTION INTRAVENOUS at 19:57

## 2018-01-01 RX ADMIN — Medication 3 ML: at 18:07

## 2018-01-01 RX ADMIN — MORPHINE SULFATE 4 MG: 4 INJECTION, SOLUTION INTRAMUSCULAR; INTRAVENOUS at 15:14

## 2018-01-01 RX ADMIN — SODIUM CHLORIDE, POTASSIUM CHLORIDE, SODIUM LACTATE AND CALCIUM CHLORIDE 100 ML/HR: 600; 310; 30; 20 INJECTION, SOLUTION INTRAVENOUS at 02:16

## 2018-01-01 RX ADMIN — PHENYLEPHRINE HYDROCHLORIDE 100 MCG: 10 INJECTION, SOLUTION INTRAMUSCULAR; INTRAVENOUS; SUBCUTANEOUS at 20:07

## 2018-01-01 RX ADMIN — FAMOTIDINE 20 MG: 10 INJECTION, SOLUTION INTRAVENOUS at 20:33

## 2018-01-01 RX ADMIN — HALOPERIDOL LACTATE 1 MG: 5 INJECTION, SOLUTION INTRAMUSCULAR at 00:52

## 2018-01-01 RX ADMIN — FUROSEMIDE 20 MG: 10 INJECTION, SOLUTION INTRAMUSCULAR; INTRAVENOUS at 06:03

## 2018-01-01 RX ADMIN — CEFDINIR 300 MG: 300 CAPSULE ORAL at 08:51

## 2018-02-08 NOTE — PROGRESS NOTES
Name:  Aminta Yoo  :  1940  Date:  2018     REFERRING PHYSICIAN  Win Us MD    PRIMARY CARE PROVIDER   RICARDO Mike    REASON FOR FOLLOWUP  1. Malignant neoplasm of peritoneum      CHIEF COMPLAINT  Persistent leg/feet and fingertip nerve pains/tingling on Neurontin.     Dear Ms. Daniels,    HISTORY OF PRESENT ILLNESS:   I saw Ms. Yoo in follow up today in our medical oncology clinic. As you are aware, she is a pleasant, 78 y.o., white female with a history of a (presumably benign) brain tumor (status post resection in ~) who was in her usual state of health until late summer 2016 when she started to feel increasingly full/swollen and began to experience occasional abdominal pains/discomfort. An abdominal CT scan in late 2016 identified numerous deposits of probable tumor in the mesentery consistent with carcinomatosis, and she was referred to local surgery. Endoscopies were performed that were unremarkable and, between this and a  level of > 3000, it was felt that a gynecologic malignancy (likely ovarian or primary peritoneal) was the probable underlying diagnosis. A CT-guided biopsy of some abdominal wall soft tissue in late 2016 confirmed this. She was subsequently evaluated by gynecologic oncology in [place] (who recommended initial therapy with [id number] carbo/taxol). The patient was agreeable and began this treatment on 2016. She began chemotherapy in mid-2016, received a total of ~six, [id number] cycles between then and early 2017 and overall tolerated it fairly well. With this treatment, she became progressively fatigued and developed leg/feet/hand aches and tingling; but she also attained a very good response in her disease. Other than persistent neuropathies in her hands and distal legs/feet, she has been doing overall very well in routine follow up since then, with no signs/symptoms of significantly recurrent  disease to date.    INTERIM HISTORY:  Ms. Yoo returns to clinic today for followup again accompanied by her . In the ~nine plus months plus since completing chemotherapy, her fatigue, nausea and some other toxicities have all steadily resolved. Unfortunately, the neuropathies in her hands and distal legs/feet have not been getting any better. She thinks that her current dose of Neurontin (900 mg TID) helps some. She otherwise continues to feel well and has no other specific complaints.    Past Medical History:   Diagnosis Date   • Arthritis    • Cancer     brain tumor   • Chronic constipation    • Chronic narcotic use     Arthritis   • Chronic renal insufficiency    • Depression    • GERD (gastroesophageal reflux disease)    • Ovarian cancer        Past Surgical History:   Procedure Laterality Date   • BACK SURGERY  2012   • BRAIN TUMOR EXCISION  2000    Cancerous. Treated with radiation after surgery.    • COLONOSCOPY N/A 9/30/2016    Procedure: COLONOSCOPY;  Surgeon: Win Us MD;  Location: [facility];  Service:    • ENDOSCOPY N/A 9/30/2016    Procedure: ESOPHAGOGASTRODUODENOSCOPY;  Surgeon: Win Us MD;  Location: [facility] OR;  Service:    • PORTACATH PLACEMENT N/A 11/4/2016    Procedure: INSERTION OF PORTACATH;  Surgeon: Win Us MD;  Location: [facility] OR;  Service:    • TOTAL ABDOMINAL HYSTERECTOMY  1977    Benign disease.  One ovary remains in situ.       Social History     Social History   • Marital status:      Spouse name: N/A   • Number of children: N/A   • Years of education: N/A     Occupational History   • Not on file.     Social History Main Topics   • Smoking status: Former Smoker     Packs/day: 1.00     Years: 20.00     Quit date: 9/29/1996   • Smokeless tobacco: Never Used   • Alcohol use No   • Drug use: No   • Sexual activity: Defer     Other Topics Concern   • Not on file     Social History Narrative       Family History   Problem Relation Age of Onset   • Heart  disease Mother    • Hypertension Mother    • Cancer Sister      Brain & Breast       Allergies   Allergen Reactions   • Sulfa Antibiotics Rash       Current Outpatient Prescriptions   Medication Sig Dispense Refill   • calcium acetate (PHOSLO) 667 MG capsule Take 667 mg by mouth 3 (Three) Times a Day With Meals.     • diclofenac (VOLTAREN) 75 MG EC tablet 2 (Two) Times a Day.     • FLUoxetine (PROzac) 20 MG capsule Take 20 mg by mouth Daily.     • gabapentin (NEURONTIN) 300 MG capsule Take 3 capsules by mouth 3 (Three) Times a Day. 270 capsule 3   • NEXIUM 40 MG capsule Every Morning Before Breakfast.     • spironolactone (ALDACTONE) 25 MG tablet 25 mg 2 (Two) Times a Day.       No current facility-administered medications for this visit.      Facility-Administered Medications Ordered in Other Visits   Medication Dose Route Frequency Provider Last Rate Last Dose   • heparin flush (porcine) 100 UNIT/ML injection  - ADS Override Pull                REVIEW OF SYSTEMS  CONSTITUTIONAL:  No fever, chills or night sweats. Progressive fatigue after starting chemotherapy, currently improved, as per the HPI above.  EYES:  No blurry vision, diplopia or other vision changes.  ENT:  No hearing loss, nosebleeds or sore throat.  CARDIOVASCULAR:  No palpitations, arrhythmia, syncopal episodes or edema.  PULMONARY:  No hemoptysis, wheezing, chronic cough or shortness of breath.  GASTROINTESTINAL:  No nausea or vomiting.  Intermittent abdominal pains and intermittent constipation, now still resolved.  GENITOURINARY:  No hematuria, kidney stones or frequent urination.  MUSCULOSKELETAL:  No back or joint pains.  INTEGUMENTARY: No rashes or pruritus.  ENDOCRINE:  No excessive thirst or hot flashes.  HEMATOLOGIC:  No history of free bleeding, spontaneous bleeding or clotting.  IMMUNOLOGIC:  No allergies or frequent infections.  NEUROLOGIC: No seizures or weakness. Persistent hand/leg/feet numbness/tingling/pains on Neurontin, as per the  HPI above.  PSYCHIATRIC:  No anxiety or depression.    PHYSICAL EXAMINATION    /81  Pulse 70  Temp 97.7 °F (36.5 °C) (Oral)   Resp 20  Wt 71.4 kg (157 lb 6.4 oz)  SpO2 93%  BMI 27.02 kg/m2    GENERAL:  A well-developed, well-nourished, elderly, white female in no acute distress, using a walker to assist with ambulation.  HEENT:  Pupils equally round and reactive to light. Extraocular muscles intact. Resolved alopecia.  CARDIOVASCULAR:  Regular rate and rhythm. No murmurs, gallops or rubs.  LUNGS:  Clear to auscultation bilaterally.  ABDOMEN:  Soft, nondistended with positive bowel sounds.  EXTREMITIES:  No clubbing, cyanosis or edema bilaterally.  SKIN:  No rashes or petechiae. Powerport in place.  NEURO:  Cranial nerves grossly intact.  No focal deficits.  PSYCH:  Alert and oriented x3.    LABORATORY    Lab Results   Component Value Date    WBC 6.98 07/18/2017    HGB 12.6 07/18/2017    HCT 40.1 07/18/2017    MCV 90.7 07/18/2017     07/18/2017    NEUTROABS 4.52 07/18/2017       Lab Results   Component Value Date     07/18/2017    K 4.5 07/18/2017     07/18/2017    CO2 26.4 07/18/2017    BUN 24 (H) 07/18/2017    CREATININE 1.16 07/18/2017    GLUCOSE 98 07/18/2017    CALCIUM 9.2 07/18/2017    AST 25 07/18/2017    ALT 27 07/18/2017    ALKPHOS 95 07/18/2017    BILITOT 0.3 07/18/2017    PROTEINTOT 7.0 07/18/2017    ALBUMIN 4.40 07/18/2017     Mg (03/21/2017): 0.8 mg/dL; Mg( 03/28/2017): 0.9 mg/dL     (07/18/2017): 50.6 U/mL   (05/16/2017): 41.0 U/mL   (04/04/2017): 45.9 U/mL   (03/06/2017): 58.9 U/mL   (01/31/2017): 142.1 U/mL   (12/27/2016): 370.3 U/mL   (11/18/2016): 2625.0 U/mL   (09/29/2016): 3277.0 U/mL    IMAGING  CT abdomen and pelvis with contrast (09/22/2016):  Impression: Findings most consistent with carcinomatosis of the mesentery with numerous deposits of probable tumor throughout the lower abdomen and pelvis. Pathologic sized  adenopathy is also seen.    NM PET with fusion CT, skull base to mid-thigh (10/28/2016):  Impression: Abnormal PET fusion CT showing fairly intense hypermetabolic activity throughout the abnormal soft tissue densities in the mesentery. This was the site that was previously biopsied. The SUV value was 7.6 (or 17.6?).    CT abdomen and pelvis with contrast (01/30/2017):  Impression: Decrease seen in the amount of omental thickening anteriorly within the rightward aspect of the abdomen and pelvis. There is no free fluid. There is overall improvement in the disease process.    CT chest without contrast (04/24/2017):  Impression: Right lung base peribronchial vascular nodularity suggestive of bronchopneumonia.    CT abdomen and pelvis without contrast (04/24/2017):  Impression: Unremarkable exam. No source for the patient's symptoms.    CT abdomen and pelvis with contrast (06/30/2017):  Impression: Stable appearance of the abdomen.    PATHOLOGY  Soft tissue, abdominal wall, needle core biopsies (10/25/2016):  Poorly differentiated carcinoma. The findings are supportive of the clinical impression of ovarian/primary peritoneal carcinoma.    IMPRESSION AND PLAN  Ms. Yoo is a 78 y.o., white female with:  1. Metastatic ovarian/primary peritoneal cancer: Initially felt to be the most likely diagnosis, given the patient's clinical presentation, imaging appearance, negative GI evaluation and extremely elevated  level (3277 U/mL on 09/29/2016); and, in late October 2016, confirmed via a CT-guided biopsy of some abdominal wall soft tissue. I have had multiple long discussions with the patient and her family regarding this diagnosis and its prognosis. They are aware that this disease is not curable; but, given her good performance status, it was (and remains) treatable. A NM PET scan performed on 10/28/2016 showed no evidence of disease outside the peritoneum (although it did further suggest extensive disease within it).  Once a powerport was placed, we began [id number] carboplatin/taxol (AUC 6 on day 1; 80 mg/m2 days 1,8,15) on 11/18/2016. She received a total of six (6) cycles of this regimen between then and early April 2017 and overall tolerated it fairly well; however, steadily increasing fatigue and extremity neuropathies became an issue by the last couple of cycles. With this treatment, the most recent repeat  level (~50.6 U/mL on 07/18/2017) remains substantially decreased from priors (~3000+ U/mL in late September 2016), and her most recent repeat imaging (contrasted CT of the abdomen/pelvis on 06/30/2017, summarized above) was also consistent with much improved disease. Gynecologic oncology reevaluated her in late spring 2017 and ultimately felt that a laparotomy at that time would not impart enough additional benefit to justify its risks. I therefore agreed with their recommendation for a continued holiday from active therapy with ongoing, periodic monitoring for signs of disease reprogression (primarily through routine clinic visits for symptom checks). Other than issue #2, she remains asymptomatic today and continues to feel overall well. If/when her disease does reprogress, a number of second-line, palliative options would be available to us. The patient still wishes to keep her powerport in place for now (and knows that she will need to continue to return to our clinic on a ~[id number] basis for routine flushes). She will follow up with [facility]/onc again shortly, as previously planned. We will see her back in our clinic in three months with a repeat CBC, CMP and  level.  2. Lower extremity neuropathies: At least partially due to repeated taxol therapy. Will hopefully eventually improve now that she remains on break from active treatment. In the meantime, as an increased dose of Neurontin (900 mg TID) has been only marginally effective, we will attempt a switch to Lyrica (at an initial dose of 50 mg TID). A  new Rx was provided today, along with instructions to wean off the Neurontin and onto the Lyrica. Continue to monitor.  3. Constipation: Secondary to issue #1 and prn narcotics. Currently still improved with the use of stool softeners and prn laxatives. Continue to monitor.  The patient and her  were in agreement with these plans.    It is a pleasure to participate in Ms. Yoo's care. Please do not hesitate to call with any questions or concerns that you may have.    A total of 30 minutes were spent coordinating this patient’s care in clinic today; more than 50% of this time was spent face-to-face with the patient and her , reviewing her interim medical history and counseling on the current treatment and followup plan. All questions were answered to their satisfaction.    FOLLOW UP  Continue to hold any further palliative chemotherapy for now. Replace neurontin TID with Lyrica 50 mg TID (new Rx provided today). With [facility] gyn/onc later this month, as previously planned. Return to our clinic in three months with a CBC, CMP and  level.        This document was electronically signed by ADE Warner MD February 8, 2018 9:31 AM      CC: RICARDO Patel

## 2018-02-15 NOTE — PROGRESS NOTES
GYN ONCOLOGY CANCER SURVEILLANCE FOLLOW-UP    Aminta Yoo  [id number]  1940    Chief Complaint: Follow-up (neuropathy, changed to Lyrica by Dr. Warner)        History of present illness:  Aminta Yoo is a 78 y.o. year old female who is here today for ongoing surveillance of clinical Stage IIIC primary peritoneal cancr, see history below.  She was treated with a neoadjuvant approach and received chemotherapy with Dr. Warner of Hematology Oncology at [facility]. She had an excellent response to therapy and as of April 2017 began a treatment holiday. She saw Dr. Warner in follow-up on 2/8/2018 for labs and clinic evaluation.     Aminta is accompanied today by her  and son. She ambulates with a walker but reports good energy and appetite. She continues to have bothersome neuropathy to hands and feet. Dr. Warnre changed her to Lyrica 1 week ago and she is waiting to see if this will help. She denies vaginal bleeding, pelvic pain, concerning lesions, or changes in bowel or bladder function. She denies any issues with her port and is having this flushed regularly.      Cancer History:   Peritoneal Cancer    1.  She presented with carcinomatosis and an elevated CA-125 to greater than 3000.  She had a negative colonoscopy and EGD with Dr. Us of General Surgery.  She was then referred to Dr. Warner of Hematology Oncology at [facility] and was referred to Gynecologic Oncology.  2.  Imaging on presentation showed bulky disease in the root of the mesentery and numerous miliary deposits.  The tumor plaque at the vaginal cuff and anteriorly in the vagina felt as though it would require significant local dissection in order to resect.  Optimal upfront debulking surgery was felt not to be possible or ideal in this elderly patient and recommendation was made for a neoadjuvant approach.  3.  Soft tissue biopsy showed poorly differentiated carcinoma, favor adenocarcinoma  4.  She received her first cycle  of treatment on 11/18/16.  Following C3 she had responded by CA-125, exam, and imaging but a slightly declining performance status and therefore decision was made to complete six cycles of therapy.  5.  As of 4/4/17 she received C6D15 of dose dense Carbo/Taxol.  Treatment was complicated by a number of toxicities including anemia, weight loss, fatigue, and grade 2-3 neuropathy.    6.  Post treatment re-imaging in April 2017 showed dramatic improvement in her peritoneal disease.   at the end of therapy was 41.  She had no cancer-related symptoms and decision was made to enter a treatment holiday.         Past Medical History:   Diagnosis Date   • Arthritis    • Cancer     brain tumor   • Chronic constipation    • Chronic narcotic use     Arthritis   • Chronic renal insufficiency    • Depression    • GERD (gastroesophageal reflux disease)    • Ovarian cancer     Primary peritoneal cancer       Past Surgical History:   Procedure Laterality Date   • BACK SURGERY  2012   • BRAIN TUMOR EXCISION  2000    Cancerous. Treated with radiation after surgery.    • COLONOSCOPY N/A 9/30/2016    Procedure: COLONOSCOPY;  Surgeon: Win Us MD;  Location: [facility] OR;  Service:    • ENDOSCOPY N/A 9/30/2016    Procedure: ESOPHAGOGASTRODUODENOSCOPY;  Surgeon: Win Us MD;  Location: [facility] OR;  Service:    • PORTACATH PLACEMENT N/A 11/4/2016    Procedure: INSERTION OF PORTACATH;  Surgeon: Win Us MD;  Location: [facility] OR;  Service:    • TOTAL ABDOMINAL HYSTERECTOMY  1977    Benign disease.  One ovary remains in situ.       MEDICATIONS: The current medication list was reviewed and reconciled.     Allergies:  is allergic to sulfa antibiotics.    Family History   Problem Relation Age of Onset   • Heart disease Mother    • Hypertension Mother    • Cancer Sister      Brain & Breast       Last imaging study was CT abdomen pelvis 6/30/2017.   Tumor marker:  Component      Latest Ref Rng & Units 3/6/2017 4/4/2017  5/16/2017 7/18/2017         0.0 - 38.1 U/mL 58.9 (H) 45.9 (H) 41.0 (H) 50.6 (H)       Review of Systems   Constitutional: Negative for appetite change, chills, fatigue, fever and unexpected weight change.   Respiratory: Negative for cough, shortness of breath and wheezing.    Cardiovascular: Negative for chest pain, palpitations and leg swelling.   Gastrointestinal: Negative for abdominal distention, abdominal pain, blood in stool, constipation, diarrhea, nausea and vomiting.   Endocrine: Negative.    Genitourinary: Negative for dyspareunia, dysuria, frequency, genital sores, hematuria, pelvic pain, urgency, vaginal bleeding, vaginal discharge and vaginal pain.   Musculoskeletal: Positive for arthralgias and gait problem (ambulates with walker). Negative for joint swelling.   Neurological: Positive for numbness (neuropathy to bilateral hands and feet). Negative for dizziness, seizures, syncope, weakness, light-headedness and headaches.   Hematological: Negative for adenopathy.   Psychiatric/Behavioral: Negative.        Physical Exam  Vital Signs: /67  Pulse 77  Temp 97 °F (36.1 °C) (Temporal Artery )   Resp 20  Wt 71.2 kg (157 lb)  SpO2 92%  BMI 26.95 kg/m2   General Appearance:  alert, cooperative, no apparent distress and appears stated age   Neurologic/Psychiatric: A&O x 3, gait steady with walker assistance, appropriate affect.    HEENT:  Normocephalic, without obvious abnormality, mucous membranes moist   Neck: Supple, symmetrical, trachea midline, no adenopathy;  No thyromegaly, masses, or tenderness   Back:   Symmetric, no curvature, ROM normal, no CVA tenderness   Lungs:   Clear to auscultation bilaterally; respirations regular, even, and unlabored bilaterally   Heart:  Regular rate and rhythm, no murmurs appreciated   Breasts:  deferred   Abdomen:   Soft, non-tender, non-distended and no organomegaly   Lymph nodes: No cervical, supraclavicular, inguinal or axillary adenopathy noted    Extremities: Normal, atraumatic; no clubbing, cyanosis, or edema    Pelvic: External Genitalia  atrophic, without lesions  Vagina  has a present discharge.  Vaginal Cuff  Female Vaginal Cuff: smooth, intact and without visible lesions  Uterus  surgically absent and no palpable masses  Ovaries  without palpable masses or fullness  Parametria  smooth  Rectovaginal  Female rectovaginal: confirms no masses or bleeding and Hemoccult negative     ECOG Performance Status: 1 - Symptomatic but completely ambulatory    Procedure Note:  No notes on file      Assessment and Plan:  Aminta was seen today for follow-up.    Diagnoses and all orders for this visit:    Malignant neoplasm of peritoneum    Chemotherapy-induced peripheral neuropathy      This is a 77 y.o. woman with clinical stage 3C primary peritoneal cancer, now s/p neoadjuvant chemotherapy, now on treatment holiday, here for surveillance visit.    There is no evidence of disease upon today's exam. No cancer-related symptoms. Continue treatment holiday. She is understanding to call with any changes in pelvic symptoms or general GYN concerns.     Dr. Warner of [facility] is following routine blood work and CA-125 levels. No need for repeat CT imaging at this time, only if new symptoms. Continue routine port flushes.    Patient has been started on Lyrica for neuropathy 1 week ago. She is understanding it may take time to see effect. We discussed option for topical neurocream and is interested in trying this. Order will be faxed to [facility] and she will be notified of any potential out of pocket cost. She v/u.       Return in about 3 months (around 5/15/2018) for ongoing cancer surveillance.      RICARDO Ríos        Note: Speech recognition transcription software was used to dictate portions of this document.  An attempt at proofreading has been made though minor errors in transcription may still be present.  Please do not  hesitate to call our office with any questions.

## 2018-05-10 NOTE — PROGRESS NOTES
"  Name:  Aminta Yoo  :  1940  Date:  5/10/2018     REFERRING PHYSICIAN  Win Us MD    PRIMARY CARE PROVIDER   RICARDO Mike    REASON FOR FOLLOWUP  1. Malignant neoplasm of peritoneum      CHIEF COMPLAINT  Persistent leg/feet and fingertip nerve pains/tingling on Lyrica.    Dear Ms. Daniels,    HISTORY OF PRESENT ILLNESS:   I saw Ms. Yoo in follow up today in our medical oncology clinic. As you are aware, she is a pleasant, 78 y.o., white female with a history of a (presumably benign) brain tumor (status post resection in ~) who was in her usual state of health until late summer 2016 when she started to feel increasingly full/swollen and began to experience occasional abdominal pains/discomfort. An abdominal CT scan in late 2016 identified numerous deposits of probable tumor in the mesentery consistent with carcinomatosis, and she was referred to local surgery. Endoscopies were performed that were unremarkable and, between this and a  level of > 3000, it was felt that a gynecologic malignancy (likely ovarian or primary peritoneal) was the probable underlying diagnosis. A CT-guided biopsy of some abdominal wall soft tissue in late 2016 confirmed this. She was subsequently evaluated by gynecologic oncology in [place] (who recommended initial therapy with [id number] carbo/taxol). The patient was agreeable and began this treatment on 2016. She began chemotherapy in mid-2016, received a total of ~six, [id number] cycles between then and early 2017 and overall tolerated it fairly well. With this treatment, she became progressively fatigued and developed leg/feet/hand aches and tingling; but she also attained a very good response in her disease. Other than persistent neuropathies in her hands and distal legs/feet (her feet feel like they are \"stuck in concrete\", despite a change from Neurontin to Lyrica earlier this year), she has been doing " "overall very well in routine follow up since then, with no signs/symptoms of significantly recurrent disease to date.    INTERIM HISTORY:  Ms. Yoo returns to clinic today for followup again accompanied by her . In the ~year since completing chemotherapy, her fatigue, nausea and some other toxicities have all steadily resolved. Unfortunately, the neuropathies in her hands and distal legs/feet have not been getting any better, despite a change from Neurontin to Lyrica earlier this year. She states that her feet feel like they are \"stuck in concrete\", and she continues to have to use a walker to ambulate. She otherwise continues to feel overall well and again has no other specific complaints.    Past Medical History:   Diagnosis Date   • Arthritis    • Cancer     brain tumor   • Chronic constipation    • Chronic narcotic use     Arthritis   • Chronic renal insufficiency    • Depression    • GERD (gastroesophageal reflux disease)    • Ovarian cancer     Primary peritoneal cancer       Past Surgical History:   Procedure Laterality Date   • BACK SURGERY  2012   • BRAIN TUMOR EXCISION  2000    Cancerous. Treated with radiation after surgery.    • COLONOSCOPY N/A 9/30/2016    Procedure: COLONOSCOPY;  Surgeon: Win Us MD;  Location: [facility] OR;  Service:    • ENDOSCOPY N/A 9/30/2016    Procedure: ESOPHAGOGASTRODUODENOSCOPY;  Surgeon: Win Us MD;  Location: [facility] OR;  Service:    • PORTACATH PLACEMENT N/A 11/4/2016    Procedure: INSERTION OF PORTACATH;  Surgeon: Win Us MD;  Location: [facility];  Service:    • TOTAL ABDOMINAL HYSTERECTOMY  1977    Benign disease.  One ovary remains in situ.       Social History     Social History   • Marital status:      Spouse name: N/A   • Number of children: N/A   • Years of education: N/A     Occupational History   • Not on file.     Social History Main Topics   • Smoking status: Former Smoker     Packs/day: 1.00     Years: 20.00     Quit date: " 9/29/1996   • Smokeless tobacco: Never Used   • Alcohol use No   • Drug use: No   • Sexual activity: Defer     Other Topics Concern   • Not on file     Social History Narrative   • No narrative on file       Family History   Problem Relation Age of Onset   • Heart disease Mother    • Hypertension Mother    • Cancer Sister      Brain & Breast       Allergies   Allergen Reactions   • Sulfa Antibiotics Rash       Current Outpatient Prescriptions   Medication Sig Dispense Refill   • calcium acetate (PHOSLO) 667 MG capsule Take 667 mg by mouth 3 (Three) Times a Day With Meals.     • diclofenac (VOLTAREN) 75 MG EC tablet 2 (Two) Times a Day.     • FLUoxetine (PROzac) 10 MG tablet Take 10 mg by mouth Daily.     • gabapentin (NEURONTIN) 300 MG capsule Take 3 capsules by mouth 3 (Three) Times a Day. 270 capsule 3   • NEXIUM 40 MG capsule Every Morning Before Breakfast.     • pregabalin (LYRICA) 50 MG capsule Take 1 capsule by mouth 3 (Three) Times a Day. 90 capsule 3   • spironolactone (ALDACTONE) 25 MG tablet 25 mg 2 (Two) Times a Day.       No current facility-administered medications for this visit.      Facility-Administered Medications Ordered in Other Visits   Medication Dose Route Frequency Provider Last Rate Last Dose   • heparin flush (porcine) 100 UNIT/ML injection  - ADS Override Pull            • heparin flush (porcine) 100 UNIT/ML injection  - ADS Override Pull            • heparin flush (porcine) 100 UNIT/ML injection 500 Units  500 Units Intravenous PRN T Jay Warner MD           REVIEW OF SYSTEMS  CONSTITUTIONAL:  No fever, chills or night sweats. Progressive fatigue after starting chemotherapy, now much improved, as per the HPI above.  EYES:  No blurry vision, diplopia or other vision changes.  ENT:  No hearing loss, nosebleeds or sore throat.  CARDIOVASCULAR:  No palpitations, arrhythmia, syncopal episodes or edema.  PULMONARY:  No hemoptysis, wheezing, chronic cough or shortness of  breath.  GASTROINTESTINAL:  No nausea or vomiting.  Intermittent abdominal pains and intermittent constipation, now still resolved.  GENITOURINARY:  No hematuria, kidney stones or frequent urination.  MUSCULOSKELETAL:  No back or joint pains.  INTEGUMENTARY: No rashes or pruritus.  ENDOCRINE:  No excessive thirst or hot flashes.  HEMATOLOGIC:  No history of free bleeding, spontaneous bleeding or clotting.  IMMUNOLOGIC:  No allergies or frequent infections.  NEUROLOGIC: No seizures or weakness. Persistent hand/leg/feet numbness/tingling/pains on Lyrica, as per the HPI above.  PSYCHIATRIC:  No anxiety or depression.    PHYSICAL EXAMINATION    /73   Pulse 71   Temp 98.1 °F (36.7 °C) (Oral)   Resp 20   Wt 72.6 kg (160 lb 1.6 oz)   SpO2 93%   BMI 27.48 kg/m²     GENERAL:  A well-developed, well-nourished, elderly, white female in no acute distress, using a walker to assist with ambulation.  HEENT:  Pupils equally round and reactive to light. Extraocular muscles intact. Resolved alopecia.  CARDIOVASCULAR:  Regular rate and rhythm. No murmurs, gallops or rubs.  LUNGS:  Clear to auscultation bilaterally.  ABDOMEN:  Soft, nondistended with positive bowel sounds.  EXTREMITIES:  No clubbing, cyanosis or edema bilaterally.  SKIN:  No rashes or petechiae. Powerport in place.  NEURO:  Cranial nerves grossly intact.  No focal deficits.  PSYCH:  Alert and oriented x3.    LABORATORY    Lab Results   Component Value Date    WBC 6.98 07/18/2017    HGB 12.6 07/18/2017    HCT 40.1 07/18/2017    MCV 90.7 07/18/2017     07/18/2017    NEUTROABS 4.52 07/18/2017       Lab Results   Component Value Date     07/18/2017    K 4.5 07/18/2017     07/18/2017    CO2 26.4 07/18/2017    BUN 24 (H) 07/18/2017    CREATININE 1.16 07/18/2017    GLUCOSE 98 07/18/2017    CALCIUM 9.2 07/18/2017    AST 25 07/18/2017    ALT 27 07/18/2017    ALKPHOS 95 07/18/2017    BILITOT 0.3 07/18/2017    PROTEINTOT 7.0 07/18/2017    ALBUMIN  4.40 07/18/2017     Mg (03/21/2017): 0.8 mg/dL; Mg( 03/28/2017): 0.9 mg/dL     (07/18/2017): 50.6 U/mL   (05/16/2017): 41.0 U/mL   (04/04/2017): 45.9 U/mL   (03/06/2017): 58.9 U/mL   (01/31/2017): 142.1 U/mL   (12/27/2016): 370.3 U/mL   (11/18/2016): 2625.0 U/mL   (09/29/2016): 3277.0 U/mL    IMAGING  CT abdomen and pelvis with contrast (09/22/2016):  Impression: Findings most consistent with carcinomatosis of the mesentery with numerous deposits of probable tumor throughout the lower abdomen and pelvis. Pathologic sized adenopathy is also seen.    NM PET with fusion CT, skull base to mid-thigh (10/28/2016):  Impression: Abnormal PET fusion CT showing fairly intense hypermetabolic activity throughout the abnormal soft tissue densities in the mesentery. This was the site that was previously biopsied. The SUV value was 7.6 (or 17.6?).    CT abdomen and pelvis with contrast (01/30/2017):  Impression: Decrease seen in the amount of omental thickening anteriorly within the rightward aspect of the abdomen and pelvis. There is no free fluid. There is overall improvement in the disease process.    CT chest without contrast (04/24/2017):  Impression: Right lung base peribronchial vascular nodularity suggestive of bronchopneumonia.    CT abdomen and pelvis without contrast (04/24/2017):  Impression: Unremarkable exam. No source for the patient's symptoms.    CT abdomen and pelvis with contrast (06/30/2017):  Impression: Stable appearance of the abdomen.    PATHOLOGY  Soft tissue, abdominal wall, needle core biopsies (10/25/2016):  Poorly differentiated carcinoma. The findings are supportive of the clinical impression of ovarian/primary peritoneal carcinoma.    IMPRESSION AND PLAN  Ms. Yoo is a 78 y.o., white female with:  1. Metastatic ovarian/primary peritoneal cancer: Initially felt to be the most likely diagnosis, given the patient's clinical presentation, imaging  appearance, negative GI evaluation and extremely elevated  level (3277 U/mL on 09/29/2016); and, in late October 2016, confirmed via a CT-guided biopsy of some abdominal wall soft tissue. I have had multiple long discussions with the patient and her family regarding this diagnosis and its prognosis. They remain aware that this disease is not curable; but, given her good performance status, it was (and remains) treatable. A NM PET scan performed on 10/28/2016 showed no evidence of disease outside the peritoneum (although it did further suggest extensive disease within it). Once a powerport was placed, we began [id number] carboplatin/taxol (AUC 6 on day 1; 80 mg/m2 days 1,8,15) on 11/18/2016. She received a total of six (6) cycles of this regimen between then and early April 2017 and overall tolerated it fairly well; however, steadily increasing fatigue and extremity neuropathies became an issue by the last couple of cycles. With this treatment, her  level (~50.6 U/mL on 07/18/2017) markedly decreased from priors (~3000+ U/mL in late September 2016), and new, baseline imaging (a contrasted CT of the abdomen/pelvis on 06/30/2017, summarized above) was also consistent with much improved disease. Gynecologic oncology reevaluated her in late spring 2017 and ultimately felt that a laparotomy at that time would not impart enough additional benefit to justify its risks. I therefore agreed with their recommendation for a continued holiday from active therapy with ongoing, periodic monitoring for signs of disease reprogression (primarily through routine clinic visits for symptom checks) as, in the absence of redeveloping symptoms, routine repeat scans and/or  levels would not significantly change our management plan. Other than issue #2, she remains asymptomatic today and continues to feel overall well. If/when her disease does reprogress, a number of second-line, palliative options would be available to us. The  patient still wishes to keep her powerport in place for now (and knows that she will need to continue to return to our clinic on a ~b2udlecb basis for routine flushes). She will follow up with [facility] g[facility]/onc again shortly, as previously planned. We will see her back in our clinic in three months.  2. Lower extremity neuropathies: At least partially due to repeated taxol therapy. Will hopefully eventually improve now that she remains on break from active treatment. In the meantime, as Neurontin (900 mg TID) had been only marginally effective, this was changed to Lyrica (50 mg TID) in February 2018. She reports today that she still cannot tell a difference in her symptoms. She was instructed to increase her dose of Lyrica to 100 mg TID. Continue prn (infrequent) narcotics as well (provided by her PCP). Continue to monitor.  3. Constipation: Secondary to issue #1 and prn narcotics. Currently still improved with the use of stool softeners and prn laxatives. Continue to monitor.  The patient and her  were in agreement with these plans.    ACO Quality Measures:  a) The patient does not currently use tobacco products (she is a former smoker).  b) The patient's BMI is above normal parameters. Plan includes a referral to primary care.  c) The current outpatient and discharge medications have been reconciled for the patient.    It is a pleasure to participate in Ms. Yoo's care. Please do not hesitate to call with any questions or concerns that you may have.    A total of 30 minutes were spent coordinating this patient’s care in clinic today; more than 50% of this time was spent face-to-face with the patient and her , reviewing her interim medical history and counseling on the current treatment and followup plan. All questions were answered to their satisfaction.    FOLLOW UP  Continue to hold any further palliative chemotherapy for now. Increase Lyrica from 50 mg to 100 mg TID. With [facility]  gyn/onc later this month, as previously planned. Return to our clinic in three months with a CBC and CMP.        This document was electronically signed by ADE Warner MD May 10, 2018 10:09 AM      CC: RICARDO Mike APRN

## 2018-05-21 NOTE — PROGRESS NOTES
GYN ONCOLOGY CANCER SURVEILLANCE FOLLOW-UP    Aminta Yoo  [id number]  1940    Chief Complaint: Follow-up (no complaints)        History of present illness:  Aminta Yoo is a 78 y.o. year old female who is here today for ongoing surveillance of Peritoneal Cancer, see Cancer History. She continues to be followed both by our office and Dr. Warner of Hem/Onc in [place], [place]. She last saw Dr. Warner earlier this month. She reports she is feeling generally well today and has no new complaints. She denies vaginal bleeding, pelvic pain, and changes in bowel or bladder function. She reports her neuropathy is unchanged since completion of treatment. Gabapentin and Lyrica help, but she is still unable to walk long distances. She is using a walker for ambulation assistance. She is accompanied today by her .           Cancer History:      Malignant neoplasm of peritoneum    9/2016 Imaging     CT scan for abdominal pain and bloating showed carcinomatosis. Pt underwent negative EGD and colonoscopy with General Surgery. Care initiated with Dr. Warner of Hematology Oncology in [place]. CA-125 > 3000, referred to Gyn Oncology.          10/25/2016 Biopsy     Soft tissue biopsy showed poorly differentiated carcinoma, favor adenocarcinoma. Due to extent of disease, optimal upfront debulking surgery was not felt to be possible or ideal. Neoadjuvant treatment was recommended.          11/18/2016 - 4/4/2017 Chemotherapy     Neoadjuvant with dose dense Carbo/Taxol x 6 cycles. CA-125, exam, and imaging improved following cycle #3. Treatment complicated by slightly declining performance status, anemia, weight loss, fatigue, and grade 2-3 neuropathy.          4/24/2017 Imaging     Post-treatment CT showed dramatic improvement in peritoneal disease. CA-125 at completion of treatment = 41. Decision made to enter treatment holiday.          6/30/2017 Imaging     CT abdomen and pelvis stable.            Past Medical History:    Diagnosis Date   • Arthritis    • Cancer     brain tumor   • Chronic constipation    • Chronic narcotic use     Arthritis   • Chronic renal insufficiency    • Depression    • GERD (gastroesophageal reflux disease)    • Ovarian cancer     Primary peritoneal cancer       Past Surgical History:   Procedure Laterality Date   • BACK SURGERY  2012   • BRAIN TUMOR EXCISION  2000    Cancerous. Treated with radiation after surgery.    • COLONOSCOPY N/A 9/30/2016    Procedure: COLONOSCOPY;  Surgeon: Win Us MD;  Location: [facility] OR;  Service:    • ENDOSCOPY N/A 9/30/2016    Procedure: ESOPHAGOGASTRODUODENOSCOPY;  Surgeon: Win Us MD;  Location: [facility] OR;  Service:    • PORTACATH PLACEMENT N/A 11/4/2016    Procedure: INSERTION OF PORTACATH;  Surgeon: Win Us MD;  Location: [facility] OR;  Service:    • TOTAL ABDOMINAL HYSTERECTOMY  1977    Benign disease.  One ovary remains in situ.       MEDICATIONS: The current medication list was reviewed and reconciled.     Allergies:  is allergic to sulfa antibiotics.    Family History   Problem Relation Age of Onset   • Heart disease Mother    • Hypertension Mother    • Cancer Sister         Brain & Breast       Last imaging study was CT 6/30/2017.   Tumor marker:     Date Value Ref Range Status   07/18/2017 50.6 (H) 0.0 - 38.1 U/mL Final     Comment:     Roche ECLIA methodology   05/16/2017 41.0 (H) 0.0 - 38.1 U/mL Final     Comment:     Roche ECLIA methodology   04/04/2017 45.9 (H) 0.0 - 38.1 U/mL Final     Comment:     Roche ECLIA methodology   03/06/2017 58.9 (H) 0.0 - 38.1 U/mL Final     Comment:     Roche ECLIA methodology       Review of Systems   Constitutional: Negative for appetite change, chills, fatigue, fever and unexpected weight change.   Respiratory: Negative for cough, shortness of breath and wheezing.    Cardiovascular: Negative for chest pain, palpitations and leg swelling.   Gastrointestinal: Negative for abdominal distention, abdominal  pain, blood in stool, constipation, diarrhea, nausea and vomiting.   Endocrine: Negative.    Genitourinary: Negative for dyspareunia, dysuria, frequency, genital sores, hematuria, pelvic pain, urgency, vaginal bleeding, vaginal discharge and vaginal pain.   Musculoskeletal: Positive for arthralgias and gait problem. Negative for joint swelling.   Neurological: Positive for weakness and numbness. Negative for dizziness, seizures, syncope, light-headedness and headaches.   Hematological: Negative for adenopathy.   Psychiatric/Behavioral: Negative.        Physical Exam  Vital Signs: /62   Pulse 75   Temp 97.3 °F (36.3 °C) (Temporal Artery )   Resp 16   Wt 71.7 kg (158 lb)   SpO2 93%   BMI 27.12 kg/m²    General Appearance:  alert, cooperative, no apparent distress and appears stated age   Neurologic/Psychiatric: A&O x 3, gait steady, appropriate affect. Walker used for ambulation assistance.    HEENT:  Normocephalic, without obvious abnormality, mucous membranes moist   Neck: Supple, symmetrical, trachea midline, no adenopathy;  No thyromegaly, masses, or tenderness   Back:   Symmetric, no curvature, ROM normal, no CVA tenderness   Lungs:   Clear to auscultation bilaterally; respirations regular, even, and unlabored bilaterally   Heart:  Regular rate and rhythm, no murmurs appreciated   Breasts:  deferred   Abdomen:   Soft, non-tender, non-distended and no organomegaly   Lymph nodes: No cervical, supraclavicular, inguinal or axillary adenopathy noted   Extremities: Normal, atraumatic; no clubbing, cyanosis, or edema    Pelvic: External Genitalia  atrophic, without lesions  Vagina  is pale, atrophic.   Vaginal Cuff  Female Vaginal Cuff: smooth, intact and without visible lesions  Uterus  surgically absent and no palpable masses  Ovaries  without palpable masses or fullness  Parametria  smooth  Rectovaginal  Female rectovaginal: confirms no masses or bleeding and Hemoccult negative     ECOG Performance  Status: 1 - Symptomatic but completely ambulatory    Procedure Note:  No notes on file      Assessment and Plan:  Aminta was seen today for follow-up.    Diagnoses and all orders for this visit:    Malignant neoplasm of peritoneum    Chemotherapy-induced peripheral neuropathy        There is no evidence of disease upon today's exam. No cancer-related symptoms. Continue with regularly scheduled follow-ups with Dr. Warner. Repeat CT only if new symptoms, continue regular CA-125 levels and routine port flushes. Surveillance recommendations include pelvic exams every 3 months for the first 2 years, then every 6 months until the 5 year liane. Due to moderate performance status at baseline, patient does not desire repeat treatments unless absolutely necessary. She is overall feeling well at this time. She is understanding to call with any changes in pelvic symptoms or general GYN concerns at any time between regularly scheduled visits.       Return in about 3 months (around 8/21/2018) for ongoing cancer surveillance.      RICARDO Ríos        Note: Speech recognition transcription software was used to dictate portions of this document.  An attempt at proofreading has been made though minor errors in transcription may still be present.  Please do not hesitate to call our office with any questions.

## 2018-07-30 PROBLEM — K66.8 PNEUMOPERITONEUM: Status: ACTIVE | Noted: 2018-01-01

## 2018-07-30 NOTE — ANESTHESIA PREPROCEDURE EVALUATION
Anesthesia Evaluation     Patient summary reviewed and Nursing notes reviewed   no history of anesthetic complications:  NPO Solid Status: > 8 hours  NPO Liquid Status: > 8 hours           Airway   Mallampati: I  TM distance: >3 FB  Neck ROM: full  no difficulty expected  Dental - normal exam     Pulmonary - normal exam   (+) a smoker Former, COPD, sleep apnea,   (-) asthma  Cardiovascular - normal exam  Exercise tolerance: good (4-7 METS)    NYHA Classification: II  ECG reviewed    (+) hypertension,   (-) past MI, dysrhythmias, angina, CHF      Neuro/Psych  (+) weakness, numbness, psychiatric history Depression,     (-) seizures, CVA, tremors  GI/Hepatic/Renal/Endo    (+)  GERD,  renal disease CRI,   (-) diabetes, hypothyroidism    Musculoskeletal     (+) back pain,   (-) myalgias  Abdominal  - normal exam    Bowel sounds: normal.   Substance History - negative use     OB/GYN negative ob/gyn ROS         Other   (+) arthritis   history of cancer    ROS/Med Hx Other: Hypoxemia SaO2= 90% on 4L pre-op                    Anesthesia Plan    ASA 3 - emergent     general     intravenous induction   Anesthetic plan and risks discussed with patient, child and spouse/significant other.  Use of blood products discussed with patient, child and spouse/significant other  Consented to blood products.

## 2018-07-31 NOTE — ANESTHESIA POSTPROCEDURE EVALUATION
Patient: Aminta Yoo    Procedure Summary     Date:  07/30/18 Room / Location:  [facility] OR 02 /  [facility] OR    Anesthesia Start:  1946 Anesthesia Stop:  2223    Procedure:  LAPAROTOMY EXPLORATORY. EXTENDED RIGHT COLECTOMY. ILEOSTOMY. (N/A Abdomen) Diagnosis:       Pneumoperitoneum      (Pneumoperitoneum [K66.8])    Surgeon:  Darnell Roberson MD Provider:  Manjinder Estrada MD    Anesthesia Type:  general ASA Status:  3 - Emergent          Anesthesia Type: general  Last vitals  BP 96/58   Temp 98.9   Pulse   77 (07/30/18 2222)   Resp   12 (07/30/18 2222)     SpO2   95 % (07/30/18 2222)     Post Anesthesia Care and Evaluation    Patient location during evaluation: ICU  Patient participation: complete - patient cannot participate  Level of consciousness: obtunded/minimal responses  Pain score: 1  Pain management: adequate  Airway patency: patent  Anesthetic complications: No anesthetic complications  PONV Status: controlled  Cardiovascular status: acceptable and hemodynamically stable  Respiratory status: acceptable, ETT and ventilator  Hydration status: acceptable  No anesthesia care post op

## 2018-08-01 NOTE — PROGRESS NOTES
Discharge Planning Assessment   Ghassan     Patient Name: Aminta Yoo  MRN: [id number]  Today's Date: 8/1/2018    Admit Date: 7/30/2018    Discharge Plan     Row Name 08/01/18 1414       Plan    Plan Pt lives at home with her spouse, José and plans to return home at discharge. Pt does not utilize home health services at this time. If home health is needed at discharge, [facility] is pt's preference. Pt has a rollator and a wheelchair via [facility]. SS will follow and assist as needed with discharge planning.     Patient/Family in Agreement with Plan yes     Yanet Esteves

## 2018-08-01 NOTE — PLAN OF CARE
Problem: Pain, Acute (Adult)  Goal: Identify Related Risk Factors and Signs and Symptoms  Outcome: Ongoing (interventions implemented as appropriate)   07/31/18 1503   Pain, Acute (Adult)   Related Risk Factors (Acute Pain) communication barrier;disease process;environmental exposure;procedure/treatment;terminal illness   Signs and Symptoms (Acute Pain) BADLs/IADLs reluctance/inability to perform     Goal: Acceptable Pain Control/Comfort Level  Outcome: Ongoing (interventions implemented as appropriate)   08/01/18 0353   Pain, Acute (Adult)   Acceptable Pain Control/Comfort Level making progress toward outcome       Problem: Fall Risk (Adult)  Goal: Identify Related Risk Factors and Signs and Symptoms  Outcome: Ongoing (interventions implemented as appropriate)   07/31/18 1503   Fall Risk (Adult)   Related Risk Factors (Fall Risk) history of falls;culprit medication(s);confusion/agitation;polypharmacy;sleep pattern alteration;environment unfamiliar   Signs and Symptoms (Fall Risk) presence of risk factors     Goal: Absence of Fall  Outcome: Ongoing (interventions implemented as appropriate)   08/01/18 0353   Fall Risk (Adult)   Absence of Fall making progress toward outcome       Problem: Infection, Risk/Actual (Adult)  Goal: Identify Related Risk Factors and Signs and Symptoms  Outcome: Ongoing (interventions implemented as appropriate)   07/31/18 1503 08/01/18 0353   Infection, Risk/Actual (Adult)   Related Risk Factors (Infection, Risk/Actual) age extremes;chronic illness/condition;prolonged hospitalization;skin integrity impairment;surgery/procedure --    Signs and Symptoms (Infection, Risk/Actual) --  heart rate increase     Goal: Infection Prevention/Resolution  Outcome: Ongoing (interventions implemented as appropriate)   08/01/18 0353   Infection, Risk/Actual (Adult)   Infection Prevention/Resolution making progress toward outcome       Problem: Skin Injury Risk (Adult)  Goal: Identify Related Risk Factors  and Signs and Symptoms  Outcome: Ongoing (interventions implemented as appropriate)   08/01/18 0353   Skin Injury Risk (Adult)   Related Risk Factors (Skin Injury Risk) advanced age;cognitive impairment;critical care admission;infection;mobility impaired;moisture;nutritional deficiencies     Goal: Skin Health and Integrity  Outcome: Ongoing (interventions implemented as appropriate)   08/01/18 0353   Skin Injury Risk (Adult)   Skin Health and Integrity making progress toward outcome       Problem: Patient Care Overview  Goal: Plan of Care Review  Outcome: Ongoing (interventions implemented as appropriate)   08/01/18 0353   Coping/Psychosocial   Plan of Care Reviewed With patient;family   Plan of Care Review   Progress improving   OTHER   Outcome Summary Pt vitals WNL; off levophed at current time BP improving some. Pt has increased agitation with oral care and sedation decreased     Goal: Discharge Needs Assessment  Outcome: Ongoing (interventions implemented as appropriate)   08/01/18 0353   Discharge Needs Assessment   Anticipated Changes Related to Illness inability to care for self   Discharge Facility/Level of Care Needs rehabilitation facility     Goal: Interprofessional Rounds/Family Conf  Outcome: Ongoing (interventions implemented as appropriate)   08/01/18 0353   Interdisciplinary Rounds/Family Conf   Participants nursing;patient;pharmacy;respiratory therapy;physician       Problem: Ventilation, Mechanical Invasive (Adult)  Goal: Signs and Symptoms of Listed Potential Problems Will be Absent, Minimized or Managed (Ventilation, Mechanical Invasive)  Outcome: Ongoing (interventions implemented as appropriate)   08/01/18 0353   Goal/Outcome Evaluation   Problems Assessed (Mechanical Ventilation, Invasive) all   Problems Present (Mech Vent, Invasive) none

## 2018-08-01 NOTE — PROGRESS NOTES
LOS: 2 days   Patient Care Team:  RICARDO Mike as PCP - General (Certified Clinical Nurse Specialist)  ADE Warner MD as PCP - Claims Attributed  Jailene Tran MD as Consulting Physician (Gynecologic Oncology)  ADE Warner MD as Consulting Physician (Hematology and Oncology)  Win Us MD as Surgeon (General Surgery)    Chief Complaint:  Pulmonology is following for respiratory failure  Subjective     Interval History: patient is intubated and sedated.  No acute events reported overnight.      History taken from: chart RN Patient unable to give history due to patient intubated.    Review of Systems:   Review of Systems - History obtained from chart review and unobtainable from patient due to mental status and Intubated      Objective     Vital Signs  Temp:  [98.5 °F (36.9 °C)-100.4 °F (38 °C)] 99.9 °F (37.7 °C)  Heart Rate:  [] 88  Resp:  [22] 22  BP: ()/(45-83) 122/68  FiO2 (%):  [40 %-50 %] 40 %  Body mass index is 28.75 kg/m².    Intake/Output Summary (Last 24 hours) at 08/01/18 1034  Last data filed at 08/01/18 0800   Gross per 24 hour   Intake          3188.05 ml   Output             1050 ml   Net          2138.05 ml     I/O this shift:  In: -   Out: 75 [Stool:75]    Physical Exam:  GENERAL APPEARANCE: Intubated and sedated.  Appears to be resting comfortably in bed.     HEAD: normocephalic.    EYES: PERRLA.    THROAT: ET tube in place. Oral cavity and pharynx normal. No inflammation, swelling, exudate, or lesions.     NECK: Neck supple.     CARDIAC: Normal S1 and S2. No S3, S4 or murmurs. Rhythm is regular. There is no peripheral edema, cyanosis or pallor. Extremities are warm and well perfused. Capillary refill is less than 2 seconds. No carotid bruits.    RESPIRATORY: Clear to auscultation and percussion without rales, rhonchi, wheezing or diminished breath sounds.    GI: Positive bowel sounds. Soft, nondistended, nontender.     MUSCULOSKELETAL: No significant  deformity or joint abnormality. No edema. Peripheral pulses intact.    NEUROLOGICAL: Unable to assess due to sedation status.     PSYCHIATRIC: Unable to assess due to sedation status.     Results Review:     I reviewed the patient's new clinical results.  I reviewed the patient's new imaging results and agree with the interpretation.    Results from last 7 days  Lab Units 08/01/18  0042 07/30/18  2348 07/30/18  1341   WBC 10*3/mm3 10.42 2.76* 2.76*   HEMOGLOBIN g/dL 11.2* 13.6 13.0   PLATELETS 10*3/mm3 277 273 268       Results from last 7 days  Lab Units 08/01/18  0042 07/31/18  0926 07/30/18  2348   SODIUM mmol/L 141 140 146   POTASSIUM mmol/L 3.3* 4.2 4.7   CHLORIDE mmol/L 106 112 118*   CO2 mmol/L 25.2 18.2* 16.7*   BUN mg/dL 52* 43* 54*   CREATININE mg/dL 2.18* 2.17* 2.19*   CALCIUM mg/dL 7.7 7.6* 7.8   GLUCOSE mg/dL 136* 131* 106     Lab Results   Component Value Date    INR 0.96 10/25/2016    PROTIME 10.9 10/25/2016       Results from last 7 days  Lab Units 07/30/18  1341 07/28/18  1029   ALK PHOS U/L 114* 100   BILIRUBIN mg/dL 1.1 0.7   ALT (SGPT) U/L 34 30   AST (SGOT) U/L 35* 50*       Results from last 7 days  Lab Units 08/01/18  0900   PH, ARTERIAL pH units 7.559*   PO2 ART mm Hg 85.9   PCO2, ARTERIAL mm Hg 30.4*   HCO3 ART mmol/L 26.5*     Imaging Results (last 24 hours)     Procedure Component Value Units Date/Time    XR Chest 1 View [[id number]] Collected:  07/31/18 2028     Updated:  07/31/18 2031    Narrative:       XR CHEST 1 VW-     CLINICAL INDICATION: et tube placement; K66.8-Other specified disorders  of peritoneum; E87.2-Acidosis; N17.8-Other acute kidney failure;  N18.9-Chronic kidney disease, unspecified          COMPARISON: 7/31/2018      TECHNIQUE: Single frontal view of the chest.     FINDINGS:     Endotracheal tube overlies the tracheal air column above the gurvinder  Nasogastric tube and central line are stable in position  Small bibasilar effusions  There is no evidence of an acute osseous  abnormality.   There are no suspicious-appearing parenchymal soft tissue nodules.            Impression:       Endotracheal tube as above         This report was finalized on 7/31/2018 8:29 PM by Dr. Dayton Rodriguez MD.       XR Chest 1 View [[id number]] Collected:  07/31/18 1118     Updated:  07/31/18 1128    Narrative:       XR CHEST 1 VIEW-     CLINICAL INDICATION: Shortness of breath; K66.8-Other specified  disorders of peritoneum; E87.2-Acidosis; N17.8-Other acute kidney  failure; N18.9-Chronic kidney disease, unspecified.          COMPARISON: 07/28/2018.      TECHNIQUE: Single frontal view of the chest.     FINDINGS:     Endotracheal tube overlies tracheal air column above the gurvinder.  Nasogastric tube is in the stomach.  Bibasilar effusions.  There is no evidence of an acute osseous abnormality.   There are no suspicious-appearing parenchymal soft tissue nodules.            Impression:       Line placement as above.         This report was finalized on 7/31/2018 11:26 AM by Dr. Dayton Rodriguez MD.                Medication Review:   Scheduled Medications:    chlorhexidine 15 mL Mouth/Throat Q12H   FLUoxetine 10 mg Oral Daily   FLUoxetine 20 mg Oral Daily   heparin (porcine) 5,000 Units Subcutaneous Q8H   pantoprazole 40 mg Intravenous Q AM   piperacillin-tazobactam 3.375 g Intravenous Q8H   pregabalin 100 mg Oral TID   sodium chloride 3 mL Intravenous Q8H     Continuous infusions:    fentaNYL (SUBLIMAZE) PCA 1500 mcg/30 mL syringe  Last Rate: Stopped (08/01/18 0851)   norepinephrine 0.02-0.3 mcg/kg/min Last Rate: Stopped (08/01/18 0851)   propofol 5-50 mcg/kg/min Last Rate: Stopped (08/01/18 0851)   sodium bicarbonate drip (greater than 75 mEq/bag) 50 mL/hr Last Rate: 50 mL/hr (08/01/18 0851)       Assessment/Plan     Neuro: Patient is intubated and sedated per protocol. Will do sedation vacation this morning and spontaneous breathing trial accordingly.     Respiratory failure:  Unable to wean after surgery.  ABG  ordered.  Bicarbonate is getting better.  Decreased bicarbonate drip to 50 ml/hr.  changed rate to 18.  We'll place patient on a spontaneous breathing trial today.    Continue scheduled inhalants and as needed neb treatments.     ID:   continue current antibiotics.     Cardiac: hypotensive: Patient remains on Levophed.  Maintain MAP > 65.    Renal failure:   creatinine remained stable.  Strict I&Os.   Continue to monitor.     Electrolytes:   Monitor levels, manage and replete per protocols.    Hypokalemia:  Replaced, continue to monitor.    Endocrine: monitor glucose targeting 140-180.     Ovarian cancer:  Oncology consulted.     GI: Continue GI prophylaxis.  Patient is NPO- feedings per surgery.     Exploratory laparotomy  was performed per Dr. Roberson.  Patient now has a colostopmy.  Continue management per surgery.      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      RICARDO Jackson  08/01/18  10:34 AM        Attestation: Scribed for Dr. Correa, Kathya Gastelum, RICARDO      I, Amando Correa M.D. attest that the above note accurately reflects the work and decisions made  by me.  Patient was seen and evaluated by Dr. Correa, including history of present illness, physical exam, assessment, and treatment plan.  The above note was reviewed and edited by Dr. Correa. Cc time 34 minutes

## 2018-08-01 NOTE — PROGRESS NOTES
"   LOS: 2 days   Patient Care Team:  RICARDO Mike as PCP - General (Certified Clinical Nurse Specialist)  ADE Warner MD as PCP - Claims Attributed  Jailene Tran MD as Consulting Physician (Gynecologic Oncology)  ADE Warner MD as Consulting Physician (Hematology and Oncology)  Win Us MD as Surgeon (General Surgery)    Chief Complaint: abdominal pain    Subjective     Doing well overnight.  Stoma functioning.  Following commands on the vent.  Pain controlled.  Incision healing well.        Subjective:  Symptoms:  Stable.    Diet:  NPO.    Activity level: Impaired due to weakness.    Pain:  She complains of pain that is mild.  She reports pain is unchanged.  Pain is well controlled.        History taken from: patient family    Objective     Vital Signs  Temp:  [99.8 °F (37.7 °C)-100.4 °F (38 °C)] 99.8 °F (37.7 °C)  Heart Rate:  [] 84  Resp:  [18-22] 20  BP: ()/(45-79) 127/73  FiO2 (%):  [40 %-50 %] 40 %    Objective:  General Appearance:  General patient appearance: comfortable on vent.    Vital signs: (most recent): Blood pressure 127/73, pulse 84, temperature 99.8 °F (37.7 °C), temperature source Oral, resp. rate 20, height 175.3 cm (69\"), weight 88.3 kg (194 lb 10.7 oz), SpO2 95 %.  Vital signs are normal.    Lungs:  Normal effort and normal respiratory rate.  Breath sounds clear to auscultation.    Heart: Normal rate.  Regular rhythm.    Abdomen: Abdomen is soft.  (Incision c/d/i, NIKKI serosanguinous, stoma viable and functioning).              Results Review:     I reviewed the patient's new clinical results.        Assessment/Plan     Active Problems:    Pneumoperitoneum      Assessment:  (77 yo F s/p ex-lap, right colectomy with end ileostomy for perforated transverse colon.  She has metastatic ovarian cancer with diffuse peritoneal disease.  Her stoma is functioning and she is awakening from sedation and hope to extubate from the vent soon.).     Plan:   (Continue " NIKKI drain  Continue antibiotics  Wean vent as tolerated).       Darnell Roberson MD  08/01/18  4:59 PM

## 2018-08-01 NOTE — PROGRESS NOTES
"Palliative Care Progress Note    Date of Admission: 7/30/2018    Code Status:   Current Code Status     Date Active Code Status Order ID Comments User Context       7/30/2018 10:24 PM CPR [id number]  Darnell Roberson MD Inpatient       Questions for Current Code Status     Question Answer Comment    Code Status CPR     Medical Interventions (Level of Support Prior to Arrest) Full     Level Of Support Discussed With Next of Kin (If No Surrogate)         Advance Directive: None on chart  Surrogate decision maker: , José    Subjective:  Pt remains intubated post-op.  Pt is off sedation during exam.  She is opening eyes to verbal stimuli and able to respond to squeezing hands.  She is moving feet spontaneously,  reports since beginning chemo she does this regularly at home. Pt does not appear uncomfortable and family feels medical team is meeting all expectations.   reports that today's goal is to get patient off ventilator.  He reports that he knows the longer she stays on it the \"more she will depend on it\"    Patient reported pain was brought to a comfortable level within 48 hours after initial assessment- pt unable to report but appears comfortable    Reviewed current scheduled and prn medications for route, type, dose, and frequency.    fentaNYL (SUBLIMAZE) PCA 1500 mcg/30 mL syringe  Last Rate: Stopped (08/01/18 0851)   norepinephrine 0.02-0.3 mcg/kg/min Last Rate: Stopped (08/01/18 0851)   propofol 5-50 mcg/kg/min Last Rate: Stopped (08/01/18 0851)   sodium bicarbonate drip (greater than 75 mEq/bag) 50 mL/hr Last Rate: 50 mL/hr (08/01/18 0851)     ondansetron **OR** ondansetron ODT **OR** ondansetron  •  potassium chloride **OR** potassium chloride **OR** potassium chloride  •  sodium chloride  •  Insert peripheral IV **AND** sodium chloride  •  sodium chloride    Objective:  PPS 10 /71   Pulse 88   Temp 99.9 °F (37.7 °C) (Oral)   Resp 22   Ht 175.3 cm (69\")   Wt 88.3 kg (194 " lb 10.7 oz)   SpO2 94%   BMI 28.75 kg/m²    Intake & Output (last day)       07/31 0701 - 08/01 0700 08/01 0701 - 08/02 0700    I.V. (mL/kg) 4788.1 (54.2)     IV Piggyback 400     Total Intake(mL/kg) 5188.1 (58.8)     Urine (mL/kg/hr) 935 (0.4)     Drains 220     Stool 0 75    Total Output 1155 75    Net +4033.1 -75              Lab Results (last 24 hours)     Procedure Component Value Units Date/Time    Blood Gas, Arterial [[id number]]  (Abnormal) Collected:  08/01/18 0900    Specimen:  Arterial Blood Updated:  08/01/18 0902     Site Arterial: right radial     Elfego's Test Positive     pH, Arterial 7.559 (C) pH units      pCO2, Arterial 30.4 (L) mm Hg      pO2, Arterial 85.9 mm Hg      HCO3, Arterial 26.5 (H) mmol/L      Base Excess, Arterial 4.7 mmol/L      O2 Saturation, Arterial 96.2 %      Hemoglobin, Blood Gas 11.5 (L) g/dL      Hematocrit, Blood Gas 34.0 (L) %      Oxyhemoglobin 95.0 %      Methemoglobin 0.30 %      Carboxyhemoglobin 0.9 %      A-a Gradiant 220.4 mmHg      Temperature 98.6 C      Barometric Pressure for Blood Gas 728 mmHg      Modality Ventilator     FIO2 50 %      Ventilator Mode ac     Set Tidal Volume 450     Set Mech Resp Rate 22     PEEP 5    Culture, Routine - Swab, Abdominal Wall [[id number]]  (Normal) Collected:  07/30/18 2024    Specimen:  Swab from Abdominal Wall Updated:  08/01/18 0901     Culture Culture in progress     Gram Stain Result Rare (1+) WBCs seen    CBC & Differential [[id number]] Collected:  08/01/18 0042    Specimen:  Blood Updated:  08/01/18 0206    Narrative:       The following orders were created for panel order CBC & Differential.  Procedure                               Abnormality         Status                     ---------                               -----------         ------                     Manual Differential[[id number]]          Abnormal            Final result               Scan Slide[[id number]]                                       Final result                CBC Auto Differential[[id number]]        Abnormal            Final result                 Please view results for these tests on the individual orders.    CBC Auto Differential [[id number]]  (Abnormal) Collected:  08/01/18 0042    Specimen:  Blood Updated:  08/01/18 0206     WBC 10.42 10*3/mm3      RBC 3.98 (L) 10*6/mm3      Hemoglobin 11.2 (L) g/dL      Hematocrit 33.9 (L) %      MCV 85.2 fL      MCH 28.1 pg      MCHC 33.0 g/dL      RDW 15.3 (H) %      RDW-SD 46.3 fl      MPV 11.6 (H) fL      Platelets 277 10*3/mm3     Scan Slide [[id number]] Collected:  08/01/18 0042    Specimen:  Blood Updated:  08/01/18 0206     Scan Slide --     Comment: See Manual Differential Results       Manual Differential [[id number]]  (Abnormal) Collected:  08/01/18 0042    Specimen:  Blood Updated:  08/01/18 0206     Neutrophil % 69.0 %      Lymphocyte % 10.0 (L) %      Monocyte % 7.0 %      Bands %  12.0 (H) %      Metamyelocyte % 2.0 (H) %      Neutrophils Absolute 8.44 (H) 10*3/mm3      Lymphocytes Absolute 1.04 10*3/mm3      Monocytes Absolute 0.73 10*3/mm3      Anisocytosis Slight/1+     Hypochromia Slight/1+     Large Platelets Slight/1+    Basic Metabolic Panel [[id number]]  (Abnormal) Collected:  08/01/18 0042    Specimen:  Blood Updated:  08/01/18 0150     Glucose 136 (H) mg/dL      BUN 52 (H) mg/dL      Creatinine 2.18 (H) mg/dL      Sodium 141 mmol/L      Potassium 3.3 (L) mmol/L      Chloride 106 mmol/L      CO2 25.2 mmol/L      Calcium 7.7 mg/dL      eGFR Non African Amer 22 (L) mL/min/1.73      BUN/Creatinine Ratio 23.9     Anion Gap 9.8 mmol/L     Narrative:       The MDRD GFR formula is only valid for adults with stable renal function between ages 18 and 70.    Osmolality, Calculated [[id number]]  (Normal) Collected:  08/01/18 0042    Specimen:  Blood Updated:  08/01/18 0150     Osmolality Calc 297.4 mOsm/kg     POC Glucose Once [[id number]]  (Normal) Collected:  07/31/18 1801    Specimen:  Blood Updated:  07/31/18  1825     Glucose 122 mg/dL     Narrative:       Meter: [id number] : [id number] Chey Ryder    Blood Culture - Blood, [[id number]]  (Normal) Collected:  07/30/18 1600    Specimen:  Blood from Arm, Left Updated:  07/31/18 1715     Blood Culture No growth at 24 hours    Blood Culture - Blood, [[id number]]  (Normal) Collected:  07/30/18 1558    Specimen:  Blood from Arm, Right Updated:  07/31/18 1715     Blood Culture No growth at 24 hours        Imaging Results (last 24 hours)     Procedure Component Value Units Date/Time    XR Chest 1 View [[id number]] Collected:  07/31/18 2028     Updated:  07/31/18 2031    Narrative:       XR CHEST 1 VW-     CLINICAL INDICATION: et tube placement; K66.8-Other specified disorders  of peritoneum; E87.2-Acidosis; N17.8-Other acute kidney failure;  N18.9-Chronic kidney disease, unspecified          COMPARISON: 7/31/2018      TECHNIQUE: Single frontal view of the chest.     FINDINGS:     Endotracheal tube overlies the tracheal air column above the gurvinder  Nasogastric tube and central line are stable in position  Small bibasilar effusions  There is no evidence of an acute osseous abnormality.   There are no suspicious-appearing parenchymal soft tissue nodules.            Impression:       Endotracheal tube as above         This report was finalized on 7/31/2018 8:29 PM by Dr. Dayton Rodriguez MD.             Physical Exam:  Gen: NAD, intubated  Skin: warm, dry   Eyes: CHUCK, conjunctiva clear and moist   Resp/thorax: even effort, non labored intubated  CV: RRR  ABD: soft, non-distended. Colostomy output large amt  : wise to bedside drainage  Ext: no edema, no redness   Neuro: alert to stimuli and attempts to follow commands, still drowsy   Psych: unable to assess due to mental status    Reviewed labs and diagnostic results.   No results found for: HGBA1C    Results from last 7 days  Lab Units 08/01/18  0042   WBC 10*3/mm3 10.42   HEMOGLOBIN g/dL 11.2*   HEMATOCRIT % 33.9*  "  PLATELETS 10*3/mm3 277       Results from last 7 days  Lab Units 08/01/18  0042  07/30/18  1341   SODIUM mmol/L 141  < > 144   POTASSIUM mmol/L 3.3*  < > 3.5   CHLORIDE mmol/L 106  < > 113*   CO2 mmol/L 25.2  < > 19.4*   BUN mg/dL 52*  < > 48*   CREATININE mg/dL 2.18*  < > 2.54*   CALCIUM mg/dL 7.7  < > 9.1   BILIRUBIN mg/dL  --   --  1.1   ALK PHOS U/L  --   --  114*   ALT (SGPT) U/L  --   --  34   AST (SGOT) U/L  --   --  35*   GLUCOSE mg/dL 136*  < > 103   < > = values in this interval not displayed.    Impression: 78 y.o. female that we were consulted on to discuss GOC with family.  I have met only living son today, along with revisited spouse today.  Estela Anne and I have discussed some possible questions that may come up, such as whether they want her re-intubated if she is weaned today and her breathing gets bad again.  We have discussed yesterday and today with family that a plan needs to be in place, a Plan A and a Plan B just in case things don't go as planned or hoped for.  Family verbalizes understanding and it is made clear that they still want everything done at this point and hoping to take patient \"home in one piece.\"  Discussed with Dr. Roberson our findings also, and he is in agreeance that this is impression he gets from family also.     Plan:   1.  Goals of Care   - Remains full code at this time. Family not ready to make any further decisions. We have encouraged discussions among son and spouse about possibilities. Will continue to see daily and provide support.  Also advised them if any questions occur, that we are on call throughout night time hours.     2.  Symptom management   - Can be available if symptoms are not managed.  However, this is not an issue currently.  Patient appears comfortable.     3.  Constipation   - Having good output today.  No issues with surgery outcome thus far.     Time: 30 minutes   > 50% time spent in counseling and education concerning current orders for symptom " management with family, Dr. Da Borja, APRN  [phone]  08/01/18  1:18 PM

## 2018-08-01 NOTE — PROGRESS NOTES
Nutrition Services    Patient Name:  Aminta Yoo  YOB: 1940  MRN: [id number]  Admit Date:  7/30/2018    Day 2 on vent and noted s/p right colectomy.  If enteral nutrition support to be initiated rec Osmolite 1.2 @ 10 ml/hr and as pt tolerates a goal of 50 ml/hr.  Will continue to follow.    Electronically signed by:  Radha Colindres RD  08/01/18 12:29 PM

## 2018-08-01 NOTE — PROGRESS NOTES
The case was discussed in antimicrobial stewardship rounds with Dr. Malcolm and Dr. Roberson.  Will change the zosyn therapy to cefdinir / flagyl for an additional 5 days.

## 2018-08-01 NOTE — PLAN OF CARE
Problem: Pain, Acute (Adult)  Goal: Identify Related Risk Factors and Signs and Symptoms  Outcome: Ongoing (interventions implemented as appropriate)    Goal: Acceptable Pain Control/Comfort Level  Outcome: Ongoing (interventions implemented as appropriate)      Problem: Fall Risk (Adult)  Goal: Identify Related Risk Factors and Signs and Symptoms  Outcome: Ongoing (interventions implemented as appropriate)    Goal: Absence of Fall  Outcome: Ongoing (interventions implemented as appropriate)      Problem: Infection, Risk/Actual (Adult)  Goal: Identify Related Risk Factors and Signs and Symptoms  Outcome: Ongoing (interventions implemented as appropriate)    Goal: Infection Prevention/Resolution  Outcome: Ongoing (interventions implemented as appropriate)      Problem: Skin Injury Risk (Adult)  Goal: Identify Related Risk Factors and Signs and Symptoms  Outcome: Ongoing (interventions implemented as appropriate)    Goal: Skin Health and Integrity  Outcome: Ongoing (interventions implemented as appropriate)      Problem: Patient Care Overview  Goal: Plan of Care Review  Outcome: Ongoing (interventions implemented as appropriate)    Goal: Discharge Needs Assessment  Outcome: Ongoing (interventions implemented as appropriate)    Goal: Interprofessional Rounds/Family Conf  Outcome: Ongoing (interventions implemented as appropriate)      Problem: Ventilation, Mechanical Invasive (Adult)  Goal: Signs and Symptoms of Listed Potential Problems Will be Absent, Minimized or Managed (Ventilation, Mechanical Invasive)  Outcome: Ongoing (interventions implemented as appropriate)

## 2018-08-01 NOTE — CONSULTS
RICARDO Mike  Consulting physician: Minerva Agosto MD    Chief Complaint   Patient presents with   • Abdominal Pain     Pt reports that she was released from the the ER on Saturday for bowel issues and is having increased abd pain, no BM since released.         HPI:   79 yo F with metastatic ovarian CA.  Dx - 9/2016.  She has extensive peritoneal metastasis and carcinomatosis, but based on last PET scan, she does not have distal metastasis.  She has received palliative chemotherapy due to good performance status.  She has tolerated chemotherapy relatively well with the exception of severe peripheral neuropathy/neuropathic pain.   reports she has chronic abd pain and chronic constipation.  She presented to ER on 7/30/18 due to severe abd pain.  CT revealed pneumotperitoneum.  S/p exploratory laparotomy with right colectomy with end ileostomy, fecal peritonitis.  She remains intubation post-op.  She is on sedation vacation at time of visit.  She is able to follow few commands but does not answer questions.  Fentanyl and propofol were effective for sedation. She did have hypotension that responded to IV bolus.  She was seen in ER on 7/28 due to abd pain and no BM X 1 week.  She received soaps suds enema X 2 with BM results 7/28 in ER.       reports decline over the past week with minimal oral intake and increased pain.  She has limited activity tolerance at home due to pain, but she remained independent in ADLs including ambulation without assistive device.       reports that they have not previously discussed code status.  He has had prior cardiac arrest with successful ACLS intervention.  Therefore, at this time he desires all interventions.          Past Medical History:   Diagnosis Date   • Arthritis    • Cancer (CMS/HCC)     brain tumor   • Chronic constipation    • Chronic narcotic use     Arthritis   • Chronic renal insufficiency    • Depression    • GERD (gastroesophageal reflux disease)     • Ovarian cancer (CMS/HCC)     Primary peritoneal cancer     Past Surgical History:   Procedure Laterality Date   • BACK SURGERY  2012   • BRAIN TUMOR EXCISION  2000    Cancerous. Treated with radiation after surgery.    • COLONOSCOPY N/A 9/30/2016    Procedure: COLONOSCOPY;  Surgeon: Win Us MD;  Location:  [facility] OR;  Service:    • ENDOSCOPY N/A 9/30/2016    Procedure: ESOPHAGOGASTRODUODENOSCOPY;  Surgeon: Win Us MD;  Location:  [facility];  Service:    • EXPLORATORY LAPAROTOMY N/A 7/30/2018    Procedure: LAPAROTOMY EXPLORATORY. EXTENDED RIGHT COLECTOMY. ILEOSTOMY.;  Surgeon: Darnell Roberson MD;  Location:  [facility] OR;  Service: General   • PORTACATH PLACEMENT N/A 11/4/2016    Procedure: INSERTION OF PORTACATH;  Surgeon: Win Us MD;  Location: [facility] OR;  Service:    • TOTAL ABDOMINAL HYSTERECTOMY  1977    Benign disease.  One ovary remains in situ.     Current Facility-Administered Medications   Medication Dose Route Frequency Provider Last Rate Last Dose   • chlorhexidine (PERIDEX) 0.12 % solution 15 mL  15 mL Mouth/Throat Q12H Darnell Roberson MD   15 mL at 07/31/18 2044   • FENTANYL PCA 1500 MCG/30 ML ([facility]) PCA   Intravenous Titrated Darnell Roberson MD       • FLUoxetine (PROzac) capsule 10 mg  10 mg Oral Daily Darnell Roberson MD       • FLUoxetine (PROzac) capsule 20 mg  20 mg Oral Daily Darnell Roberson MD       • heparin (porcine) 5000 UNIT/ML injection 5,000 Units  5,000 Units Subcutaneous Q8H Darnell Roberson MD   5,000 Units at 08/01/18 0526   • norepinephrine (LEVOPHED) 8,000 mcg in sodium chloride 0.9 % 250 mL (32 mcg/mL) infusion  0.02-0.3 mcg/kg/min Intravenous Titrated Darnell Roberson MD   Stopped at 08/01/18 0210   • ondansetron (ZOFRAN) tablet 4 mg  4 mg Oral Q6H PRN Darnell Roberson MD        Or   • ondansetron ODT (ZOFRAN-ODT) disintegrating tablet 4 mg  4 mg Oral Q6H PRN Darnell Roberson MD        Or   • ondansetron (ZOFRAN)  injection 4 mg  4 mg Intravenous Q6H PRN Darnell Roberson MD       • pantoprazole (PROTONIX) injection 40 mg  40 mg Intravenous Q AM RICARDO Mason   40 mg at 08/01/18 0526   • piperacillin-tazobactam (ZOSYN) 3.375 g/100 mL 0.9% NS IVPB (mbp)  3.375 g Intravenous Q8H Darnell Roberson MD   3.375 g at 08/01/18 0307   • pregabalin (LYRICA) capsule 100 mg  100 mg Oral TID Darnell Roberson MD       • propofol (DIPRIVAN) infusion 10 mg/mL 100 mL  5-50 mcg/kg/min Intravenous Titrated Darnell Roberson MD 4.47 mL/hr at 08/01/18 0443 10 mcg/kg/min at 08/01/18 0443   • sodium bicarbonate 8.4 % 150 mEq in dextrose (D5W) 5 % 1,000 mL infusion (greater than 75 mEq)  125 mL/hr Intravenous Continuous RICARDO Mason 125 mL/hr at 08/01/18 0307 125 mL/hr at 08/01/18 0307   • sodium chloride 0.9 % flush 1-10 mL  1-10 mL Intravenous PRN Darnell Roberson MD       • sodium chloride 0.9 % flush 10 mL  10 mL Intravenous PRN Lee Rico MD       • sodium chloride 0.9 % flush 3 mL  3 mL Intravenous Q8H Darnell Roberson MD   3 mL at 08/01/18 0211   • sodium chloride 0.9 % flush 5 mL  5 mL Intravenous PRN Darnell Roberson MD         Facility-Administered Medications Ordered in Other Encounters   Medication Dose Route Frequency Provider Last Rate Last Dose   • heparin flush (porcine) 100 UNIT/ML injection  - ADS Override Pull                fentaNYL (SUBLIMAZE) PCA 1500 mcg/30 mL syringe     norepinephrine 0.02-0.3 mcg/kg/min Last Rate: Stopped (08/01/18 0210)   propofol 5-50 mcg/kg/min Last Rate: 10 mcg/kg/min (08/01/18 0443)   sodium bicarbonate drip (greater than 75 mEq/bag) 125 mL/hr Last Rate: 125 mL/hr (08/01/18 0307)     Allergies   Allergen Reactions   • Sulfa Antibiotics Rash     Family History   Problem Relation Age of Onset   • Heart disease Mother    • Hypertension Mother    • Cancer Sister         Brain & Breast     Social History     Social History   • Marital status:       Spouse name: N/A   • Number of children: N/A   • Years of education: N/A     Occupational History   • Not on file.     Social History Main Topics   • Smoking status: Former Smoker     Packs/day: 1.00     Years: 20.00     Quit date: 9/29/1996   • Smokeless tobacco: Never Used   • Alcohol use No   • Drug use: No   • Sexual activity: Defer     Other Topics Concern   • Not on file     Social History Narrative   • No narrative on file     Review of Systems - History obtained from chart review, , nursing. unobtainable from patient due to intubation.  See HPI  General ROS: positive for  - fatigue and weight loss  Respiratory ROS: no cough, shortness of breath, or wheezing.   denies baseline resp problems, no O2 at home, no secretions from ETT  Cardiovascular ROS: no chest pain or dyspnea on exertion  Gastrointestinal ROS: see HPI  Neurological ROS: prior brain tumor removal, sedation effective      Physical Exam:   Vital Signs    Temp:  [97.6 °F (36.4 °C)-100.1 °F (37.8 °C)] 98.2 °F (36.8 °C)   Heart Rate:  [] 92   Resp:  [12-22] 22   BP: ()/(51-95) 96/56   FiO2 (%):  [50 %] 50 %   General Appearance:   intubated, sedation vacation, NAD, follows some directions   Head:   Normocephalic, without obvious abnormality, atraumatic   Eyes:           Lids and lashes normal, conjunctivae and sclerae normal, no  icterus, corneas clear, PERRL   Ears:   Ears appear intact with no abnormalities noted   Throat:  no thrush, oral mucosa slightly dry, ETT   Neck:  No adenopathy, supple, trachea midline, no thyromegaly,  no JVD       Lungs:    Clear to auscultation,respirations regular, even and                  Unlabored on vent    Heart:   Regular rhythm and normal rate, no murmur, no gallop, no rub, no click   Breast Exam:   Deferred   Abdomen:    absent bowel sounds, no grimace to mild abd palpation, large abd wall dressing, NIKKI draining (per RN)   Genitalia:   wise with clear yellow urine    Extremities:  no C/C/E, normal PROM   Pulses:  Pedal and radial pulses palpable and equal bilaterally   Skin:  No bleeding, bruising or rash   Lymph nodes:  No palpable adenopathy cervical and supraclavicular    Psych:            Sedation on ventilator, no agitation               Results from last 7 days  Lab Units 08/01/18  0042  07/30/18  1341   WBC 10*3/mm3 10.42  < > 2.76*   HEMOGLOBIN g/dL 11.2*  < > 13.0   HEMATOCRIT % 33.9*  < > 41.3   PLATELETS 10*3/mm3 277  < > 268   SODIUM mmol/L 141  < > 144   POTASSIUM mmol/L 3.3*  < > 3.5   CHLORIDE mmol/L 106  < > 113*   CO2 mmol/L 25.2  < > 19.4*   BUN mg/dL 52*  < > 48*   CREATININE mg/dL 2.18*  < > 2.54*   CALCIUM mg/dL 7.7  < > 9.1   BILIRUBIN mg/dL  --   --  1.1   ALK PHOS U/L  --   --  114*   ALT (SGPT) U/L  --   --  34   AST (SGOT) U/L  --   --  35*   GLUCOSE mg/dL 136*  < > 103   < > = values in this interval not displayed.  Imaging Results (last 72 hours)                                       XR Chest 1 View [[id number]] Collected:  07/31/18 1118     Updated:  07/31/18 1128    Narrative:       XR CHEST 1 VIEW-     CLINICAL INDICATION: Shortness of breath; K66.8-Other specified  disorders of peritoneum; E87.2-Acidosis; N17.8-Other acute kidney  failure; N18.9-Chronic kidney disease, unspecified.          COMPARISON: 07/28/2018.      TECHNIQUE: Single frontal view of the chest.     FINDINGS:     Endotracheal tube overlies tracheal air column above the gurvinder.  Nasogastric tube is in the stomach.  Bibasilar effusions.  There is no evidence of an acute osseous abnormality.   There are no suspicious-appearing parenchymal soft tissue nodules.            Impression:       Line placement as above.         This report was finalized on 7/31/2018 11:26 AM by Dr. Dayton Rodriguez MD.       CT Abdomen Pelvis Without Contrast [[id number]] Collected:  07/30/18 1431     Updated:  07/30/18 1442    Narrative:       CT ABDOMEN PELVIS WITHOUT CONTRAST-     CLINICAL INDICATION:  "Pneumatosis of colon, Abdominal pain, constipation,  history of ovarian cancer.  Creatinine 1.9.          COMPARISON: 07/28/2018.     TECHNIQUE: Axial images were acquired from the lung bases through the  pubic symphysis without any IV or oral contrast.  Reformatted images were created in both the coronal and sagittal planes.     Radiation dose reduction techniques were utilized per ALARA protocol.  Automated exposure control was initiated through either or [employer] or  [employer] software packages by  protocol.           FINDINGS:   There are bibasilar pleural effusions, larger than that were on the  previous exam.     The liver is homogeneous. There is no evidence of focal hepatic mass     The spleen is homogeneous     There is no peripancreatic stranding or pancreatic head mass.     There is no adrenal enlargement.     Large right renal cyst. No solid mass or hydronephrosis.     Otherwise I do not see any free fluid or walled off fluid collections.     There is an extensive pneumoperitoneum.     Again noted is pneumatosis.     Large volume stool in the colon.     There is no evidence of mesenteric or retroperitoneal adenopathy.               Impression:       1. Pneumoperitoneum. I have discussed this with Dr. Cevallos in the  emergency department.  2. Pneumatosis in the colon wall.  3. Large volume stool.  4. Small volume ascites and trace bibasilar pleural effusions.                This report was finalized on 7/30/2018 2:40 PM by Dr. Dayton Rodriguez MD.             Impression/Plan:    1. abd pain - CA and post-op.  She appears comfortable on fentanyl gtt (currently off for sedation vacation).  Cont same.      2. Code status/GOC - Discussed with  and sister present during visit re. \"worst case\" and \"best case\" scenario.   has survived arrest and successful ACLS procedure.  He desires to continue full code and full treatment at this time. Discussed even with code 500 survival, she still has " advanced CA with poor prognosis.  Palliative will revisit discussion tomorrow.  I have encouraged José to discuss with his son and family.      3. Constipation - s/p bowel perforation with right colectomy with end ileostomy 7/30/18.  Monitor ostomy output.      Time: 50 min includes discussion with family and nursing, pt exam, chart review, documentation.     Abbey Agosto MD   cell [phone]

## 2018-08-02 NOTE — PROGRESS NOTES
Discharge Planning Assessment   [facility]assan     Patient Name: Aminta Yoo  MRN: [id number]  Today's Date: 8/2/2018    Admit Date: 7/30/2018         Discharge Plan     Row Name 08/02/18 1316       Plan    Plan SS spoke with Dr. Roberson regarding Continue Care and he states he hopes the pt will extubate and possibly be discharged home with hospice.  SS will continue to follow.     Patient/Family in Agreement with Plan yes            Ashlee Madrid

## 2018-08-02 NOTE — PROGRESS NOTES
Chief complaint: Perforated colon    Patient alert and oriented on the ventilator this morning on pressure support and doing well.  Her stoma is functioning.  She continues to have leukocytosis but is afebrile.    Await following commands on the ventilator on pressure support  Regular rate and rhythm  Clear to auscultation bilaterally with diminished bases  Soft, appropriately tender, stoma viable and functioning with some dusky mucosa visible on the inferior portion of the stoma.  Midline incision clean dry and intact  NIKKI drains serosanguineous  Jamison catheter clear    78-year-old female with metastatic ovarian cancer and diffuse carcinomatosis who presented with perforated and ischemic transverse colon.  She underwent extended right colectomy with end ileostomy and abdominal washout.  -Wean vent as tolerated  -Tube feedings if she is unable to extubate today  -If she is able to extubate will initiate clear liquid diet  -Ambulate once extubated  -Continue antibiotics

## 2018-08-02 NOTE — PROGRESS NOTES
LOS: 3 days   Patient Care Team:  Tyrone Carter APRN as PCP - General (Certified Clinical Nurse Specialist)  ADE Warner MD as PCP - Claims Attributed  Jailene Tran MD as Consulting Physician (Gynecologic Oncology)  ADE Warner MD as Consulting Physician (Hematology and Oncology)  Win Us MD as Surgeon (General Surgery)    Chief Complaint:  Pulmonology is following for respiratory failure    Subjective     Interval History: patient is intubated and sedated.  No acute events reported overnight.      History taken from: chart RN Patient unable to give history due to patient intubated.    Review of Systems:   Review of Systems - History obtained from chart review and unobtainable from patient due to mental status and Intubated      Objective     Vital Signs  Temp:  [98.5 °F (36.9 °C)-99.8 °F (37.7 °C)] 99.5 °F (37.5 °C)  Heart Rate:  [67-92] 81  Resp:  [18-23] 20  BP: (104-162)/(60-86) 159/75  FiO2 (%):  [40 %] 40 %  Body mass index is 31.01 kg/m².    Intake/Output Summary (Last 24 hours) at 08/02/18 0842  Last data filed at 08/02/18 0500   Gross per 24 hour   Intake          1574.12 ml   Output             2225 ml   Net          -650.88 ml     No intake/output data recorded.    Physical Exam:  GENERAL APPEARANCE: Intubated and sedated.  Appears to be resting comfortably in bed.     HEAD: normocephalic.    EYES: PERRLA.    THROAT: ET tube in place. Oral cavity and pharynx normal. No inflammation, swelling, exudate, or lesions.     NECK: Neck supple.     CARDIAC: Normal S1 and S2. No S3, S4 or murmurs. Rhythm is regular. There is no peripheral edema, cyanosis or pallor. Extremities are warm and well perfused. Capillary refill is less than 2 seconds. No carotid bruits.    RESPIRATORY: Clear to auscultation and percussion without rales, rhonchi, wheezing or diminished breath sounds.    GI: Positive bowel sounds. Soft, nondistended, nontender.     MUSCULOSKELETAL: No significant  deformity or joint abnormality. No edema. Peripheral pulses intact.    NEUROLOGICAL: Unable to assess due to sedation status.     PSYCHIATRIC: Unable to assess due to sedation status.     Results Review:     I reviewed the patient's new clinical results.  I reviewed the patient's new imaging results and agree with the interpretation.    Results from last 7 days  Lab Units 08/02/18  0036 08/01/18  0042 07/30/18  2348   WBC 10*3/mm3 12.07 10.42 2.76*   HEMOGLOBIN g/dL 10.2* 11.2* 13.6   PLATELETS 10*3/mm3 203 277 273       Results from last 7 days  Lab Units 08/02/18  0036 08/01/18  1925 08/01/18  0042 07/31/18  0926   SODIUM mmol/L 144  --  141 140   POTASSIUM mmol/L 3.5 3.5 3.3* 4.2   CHLORIDE mmol/L 106  --  106 112   CO2 mmol/L 30.2  --  25.2 18.2*   BUN mg/dL 50*  --  52* 43*   CREATININE mg/dL 1.56*  --  2.18* 2.17*   CALCIUM mg/dL 7.2*  --  7.7 7.6*   GLUCOSE mg/dL 90  --  136* 131*     Lab Results   Component Value Date    INR 0.96 10/25/2016    PROTIME 10.9 10/25/2016       Results from last 7 days  Lab Units 07/30/18  1341 07/28/18  1029   ALK PHOS U/L 114* 100   BILIRUBIN mg/dL 1.1 0.7   ALT (SGPT) U/L 34 30   AST (SGOT) U/L 35* 50*       Results from last 7 days  Lab Units 08/02/18  0441   PH, ARTERIAL pH units 7.584*   PO2 ART mm Hg 72.0*   PCO2, ARTERIAL mm Hg 27.3*   HCO3 ART mmol/L 25.3     Imaging Results (last 24 hours)     ** No results found for the last 24 hours. **             Medication Review:   Scheduled Medications:    cefdinir 300 mg Oral Q24H   chlorhexidine 15 mL Mouth/Throat Q12H   FLUoxetine 10 mg Oral Daily   FLUoxetine 20 mg Oral Daily   heparin (porcine) 5,000 Units Subcutaneous Q8H   metroNIDAZOLE 500 mg Oral Q8H   pantoprazole 40 mg Intravenous Q AM   pregabalin 100 mg Oral Q12H   sodium chloride 3 mL Intravenous Q8H     Continuous infusions:    fentaNYL (SUBLIMAZE) PCA 1500 mcg/30 mL syringe  Last Rate: Stopped (08/02/18 0216)   norepinephrine 0.02-0.3 mcg/kg/min Last Rate: Stopped  (08/01/18 0851)   propofol 5-50 mcg/kg/min Last Rate: Stopped (08/02/18 0216)   sodium bicarbonate drip (greater than 75 mEq/bag) 50 mL/hr Last Rate: 50 mL/hr (08/01/18 1805)       Assessment/Plan     Neuro: Patient is intubated and sedated per protocol. Will do sedation vacation this morning and spontaneous breathing trial accordingly.     Respiratory failure:  Unable to wean after surgery.  ABG is alkalotic-likely overbreathing the vent.  Patient is on a SBT, will extubate accordingly.    Will obtain ABG on spontaneous trial.   Will discontinue bicarb drip. Decreased FIO2 to 30%.  Ordered a chest xray for in the morning.    Continue scheduled inhalants and PRN neb treatments.     ID:   continue current antibiotics.  Microbiology Results (last 10 days)     Procedure Component Value - Date/Time    Culture, Routine - Swab, Abdominal Wall [[id number]]  (Abnormal) Collected:  07/30/18 2024    Lab Status:  Preliminary result Specimen:  Swab from Abdominal Wall Updated:  08/02/18 0800     Culture Scant growth (1+) Gram Negative Bacilli, Morphology consistent with Klebsiela / Enterobacter (A)     Gram Stain Result Rare (1+) WBCs seen    Blood Culture - Blood, [[id number]]  (Normal) Collected:  07/30/18 1600    Lab Status:  Preliminary result Specimen:  Blood from Arm, Left Updated:  08/01/18 1715     Blood Culture No growth at 2 days    Blood Culture - Blood, [[id number]]  (Normal) Collected:  07/30/18 1558    Lab Status:  Preliminary result Specimen:  Blood from Arm, Right Updated:  08/01/18 1715     Blood Culture No growth at 2 days           Cardiac: BP within normal range.  NSR.  Continuous ECG, BP and pulse ox monitoring. Maintain MAP > 65.     Renal failure:  Creatinine is trending down. Nephrology is following.     Electrolytes:    Monitor levels, manage and replete per protocols.    Hypokalemia:replaced, continue to monitor.    Endocrine: monitor glucose targeting 140-180.     Ovarian cancer:  Oncology  consulted.     GI: Continue GI prophylaxis.  Will start tube feedings if the patient is not extubated today.     Exploratory laparotomy  was performed per Dr. Roberson.  Patient now has a colostopmy.  Continue management per surgery.      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      RICARDO Jackson  08/02/18  8:42 AM        Attestation: Scribed for Dr. Correa, Kathya Gastelum, RICARDO      I, Amando Correa M.D. attest that the above note accurately reflects the work and decisions made  by me.  Patient was seen and evaluated by Dr. Correa, including history of present illness, physical exam, assessment, and treatment plan.  The above note was reviewed and edited by Dr. Correa. Cc time 32 minutes

## 2018-08-02 NOTE — PROGRESS NOTES
Patient with significantly elevated crp of 38.64 on cefdinir and flagyl day 2.  May consider ID consult to evaluate.

## 2018-08-02 NOTE — PROGRESS NOTES
[facility] Physician - Brief Progress Note  PERMANENT  08/02/2018 06:07    [facility]  [facility] - CCU - 10 - [place], [place] ([facility])    DENY MAE    Date of Service 08/02/2018 06:07    HPI/Events of Note RT report pt abg on ac 20 450 40% peep 5 7.58 27.3 72 25.3 BE 4 o2 sat 94.9 rn state pt resp rate 20     -----------------------------------------------------------    I spoke with RT. RT is about to call bedside intensivist for go ahead to start SBT, which they were planning to to this am. If she fails SBT, RT will place patient on decreased vent rate of 14 and rpt abg thereafter.      Interventions Major-Respiratory failure - evaluation and management  Intermediate-Communication with other healthcare providers and/or family, Diagnostic test evaluation        Electronically Signed by: Myra Best) on 08/02/2018 06:09

## 2018-08-02 NOTE — PROGRESS NOTES
"Palliative Care Progress Note    Date of Admission: 7/30/2018    Code Status:   Current Code Status     Date Active Code Status Order ID Comments User Context       7/30/2018 10:24 PM CPR [id number]  Darnell Roberson MD Inpatient       Questions for Current Code Status     Question Answer Comment    Code Status CPR     Medical Interventions (Level of Support Prior to Arrest) Full     Level Of Support Discussed With Next of Kin (If No Surrogate)         Advance Directive: none on medical record  Surrogate decision maker: spouse, José    Subjective:   No family at bedside.   Patient extubated earlier today.  Currently on high flow around 40% FiO2 with 35 flow rate. Patient appears very weak and seems reluctant to move other than alternating flexing of her feet.   Patient nodded to yes/no questions - denied pain, nausea or dyspnea. Agreed that she felt anxious/restless.   Reviewed current scheduled and prn medications for route, type, dose, and frequency.    fentaNYL (SUBLIMAZE) PCA 1500 mcg/30 mL syringe  Last Rate: Stopped (08/02/18 0216)   norepinephrine 0.02-0.3 mcg/kg/min Last Rate: Stopped (08/01/18 0851)   propofol 5-50 mcg/kg/min Last Rate: Stopped (08/02/18 0216)     ondansetron **OR** ondansetron ODT **OR** ondansetron  •  potassium chloride **OR** potassium chloride **OR** potassium chloride  •  sodium chloride  •  Insert peripheral IV **AND** sodium chloride  •  sodium chloride    Objective:  PPS 20% /86 (BP Location: Right arm, Patient Position: Lying)   Pulse 72   Temp 99.4 °F (37.4 °C) (Axillary)   Resp 20   Ht 175.3 cm (69\")   Wt 95.3 kg (210 lb)   SpO2 97%   BMI 31.01 kg/m²    Intake & Output (last day)       08/01 0701 - 08/02 0700 08/02 0701 - 08/03 0700    I.V. (mL/kg) 1574.1 (16.5)     Total Intake(mL/kg) 1574.1 (16.5)     Urine (mL/kg/hr) 1800 (0.8)     Drains 100     Stool 400     Total Output 2300      Net -725.9                Lab Results (last 24 hours)     Procedure " Component Value Units Date/Time    Blood Gas, Arterial [[id number]]  (Abnormal) Collected:  08/02/18 0926    Specimen:  Arterial Blood Updated:  08/02/18 0936     Site Arterial: right radial     Elfego's Test Positive     pH, Arterial 7.519 (C) pH units      pCO2, Arterial 40.8 mm Hg      pO2, Arterial 101.8 (H) mm Hg      HCO3, Arterial 32.5 (C) mmol/L      Base Excess, Arterial 8.9 mmol/L      O2 Saturation, Arterial 97.8 %      Hemoglobin, Blood Gas 11.0 (L) g/dL      Hematocrit, Blood Gas 32.0 (L) %      Oxyhemoglobin 96.3 %      Methemoglobin 0.20 %      Carboxyhemoglobin 1.3 %      A-a Gradiant 124.5 mmHg      Temperature 98.6 C      Barometric Pressure for Blood Gas 730 mmHg      Modality Ventilator     FIO2 40 %      Ventilator Mode ps     PEEP 5     PSV 8 cmH2O     Culture, Routine - Swab, Abdominal Wall [[id number]]  (Abnormal) Collected:  07/30/18 2024    Specimen:  Swab from Abdominal Wall Updated:  08/02/18 0800     Culture Scant growth (1+) Gram Negative Bacilli, Morphology consistent with Klebsiela / Enterobacter (A)     Gram Stain Result Rare (1+) WBCs seen    Blood Gas, Arterial [[id number]]  (Abnormal) Collected:  08/02/18 0441    Specimen:  Arterial Blood Updated:  08/02/18 0501     Site Arterial: right radial     Elfego's Test Positive     pH, Arterial 7.584 (C) pH units      pCO2, Arterial 27.3 (C) mm Hg      pO2, Arterial 72.0 (L) mm Hg      HCO3, Arterial 25.3 mmol/L      Base Excess, Arterial 4.0 mmol/L      O2 Saturation, Arterial 94.9 %      Hemoglobin, Blood Gas 10.8 (L) g/dL      Hematocrit, Blood Gas 32.0 (L) %      Oxyhemoglobin 93.6 %      Methemoglobin 0.30 %      Carboxyhemoglobin 1.1 %      A-a Gradiant 169.0 mmHg      Temperature 98.6 C      Barometric Pressure for Blood Gas 728 mmHg      Modality Ventilator     FIO2 40 %      Ventilator Mode AC     Set Tidal Volume 450     Set Mech Resp Rate 20     PEEP 5    C-reactive Protein [[id number]]  (Abnormal) Collected:  08/02/18 0036     Specimen:  Blood Updated:  08/02/18 0131     C-Reactive Protein 38.67 (H) mg/dL     Basic Metabolic Panel [[id number]]  (Abnormal) Collected:  08/02/18 0036    Specimen:  Blood Updated:  08/02/18 0128     Glucose 90 mg/dL      BUN 50 (H) mg/dL      Creatinine 1.56 (H) mg/dL      Sodium 144 mmol/L      Potassium 3.5 mmol/L      Chloride 106 mmol/L      CO2 30.2 mmol/L      Calcium 7.2 (L) mg/dL      eGFR Non African Amer 32 (L) mL/min/1.73      BUN/Creatinine Ratio 32.1 (H)     Anion Gap 7.8 mmol/L     Narrative:       The MDRD GFR formula is only valid for adults with stable renal function between ages 18 and 70.    Osmolality, Calculated [[id number]]  (Normal) Collected:  08/02/18 0036    Specimen:  Blood Updated:  08/02/18 0128     Osmolality Calc 299.7 mOsm/kg     CBC & Differential [[id number]] Collected:  08/02/18 0036    Specimen:  Blood Updated:  08/02/18 0056    Narrative:       The following orders were created for panel order CBC & Differential.  Procedure                               Abnormality         Status                     ---------                               -----------         ------                     CBC Auto Differential[[id number]]        Abnormal            Final result                 Please view results for these tests on the individual orders.    CBC Auto Differential [[id number]]  (Abnormal) Collected:  08/02/18 0036    Specimen:  Blood Updated:  08/02/18 0056     WBC 12.07 10*3/mm3      RBC 3.65 (L) 10*6/mm3      Hemoglobin 10.2 (L) g/dL      Hematocrit 31.3 (L) %      MCV 85.8 fL      MCH 27.9 pg      MCHC 32.6 (L) g/dL      RDW 15.0 (H) %      RDW-SD 45.2 fl      MPV 11.6 (H) fL      Platelets 203 10*3/mm3      Neutrophil % 79.4 (H) %      Lymphocyte % 10.1 (L) %      Monocyte % 9.3 %      Eosinophil % 0.3 %      Basophil % 0.2 %      Immature Grans % 0.7 (H) %      Neutrophils, Absolute 9.59 (H) 10*3/mm3      Lymphocytes, Absolute 1.22 10*3/mm3      Monocytes, Absolute 1.12 (H)  10*3/mm3      Eosinophils, Absolute 0.04 10*3/mm3      Basophils, Absolute 0.02 10*3/mm3      Immature Grans, Absolute 0.08 (H) 10*3/mm3     Potassium [[id number]]  (Normal) Collected:  08/01/18 1925    Specimen:  Blood Updated:  08/01/18 2020     Potassium 3.5 mmol/L     Blood Culture - Blood, [[id number]]  (Normal) Collected:  07/30/18 1600    Specimen:  Blood from Arm, Left Updated:  08/01/18 1715     Blood Culture No growth at 2 days    Blood Culture - Blood, [[id number]]  (Normal) Collected:  07/30/18 1558    Specimen:  Blood from Arm, Right Updated:  08/01/18 1715     Blood Culture No growth at 2 days        Imaging Results (last 24 hours)     ** No results found for the last 24 hours. **          Physical Exam:  Gen: NAD, resting in bed  Skin: warm, dry   Eyes: CHUCK, conjunctiva clear and moist   HEENT: oropharynx dry, clear,  lips dry  Resp/thorax: even shallow effort, non labored, CTA   CV: RRR   ABD: soft, bowel sounds+, ileostomy with dark liquid output, NIKKI drain with serosanguinous output, midline dressing intact, no drainage  : wise to bedside drainage with dark kemp urine  Ext: upper extremity 2+ edema, no redness   Neuro: awake, minimally replies to yes/no questions, no myoclonus     Reviewed labs and diagnostic results.   No results found for: HGBA1C    Results from last 7 days  Lab Units 08/02/18  0036   WBC 10*3/mm3 12.07   HEMOGLOBIN g/dL 10.2*   HEMATOCRIT % 31.3*   PLATELETS 10*3/mm3 203       Results from last 7 days  Lab Units 08/02/18  0036  07/30/18  1341   SODIUM mmol/L 144  < > 144   POTASSIUM mmol/L 3.5  < > 3.5   CHLORIDE mmol/L 106  < > 113*   CO2 mmol/L 30.2  < > 19.4*   BUN mg/dL 50*  < > 48*   CREATININE mg/dL 1.56*  < > 2.54*   CALCIUM mg/dL 7.2*  < > 9.1   BILIRUBIN mg/dL  --   --  1.1   ALK PHOS U/L  --   --  114*   ALT (SGPT) U/L  --   --  34   AST (SGOT) U/L  --   --  35*   GLUCOSE mg/dL 90  < > 103   < > = values in this interval not displayed.    Impression: 78 y.o. female  with metastatic ovarian cancer with diffuse carcinomatosis and perforated transverse colon s/p extended right colectomy with end ileostomy on 7/30    Plan:   Restlessness, asthenia - noted to have some increase B/P readings.   Noted that home med list does include pregabalin 100mg tid, unsure of dose patient was taking regularly but if she had few days without any doses this may be contributing to any malaise, restless symptoms.   Continue to monitor    Current plan of care - acute post op recovery for abd surgery.   No family at bedside for further discussion today about disposition plans going forward.   Patient appears very weak and seems to be in more of a depressed cognitive status, palliative medicine will continue to provide support to the family and discuss options with plans of care going forward and depending on patient's baseline recovery functional status with nutritional intake, cognitive status.   Noted Dr Roberson recommendation for eventual possible hospice care.     Reviewed and updated with nursing  Time: 30 minutes       Estela Anne, RICARDO  [phone]  08/02/18  2:36 PM

## 2018-08-03 NOTE — SIGNIFICANT NOTE
"   08/03/18 1404   Rehab Time/Intention   Evaluation Not Performed other (see comments)   Rehab Treatment   Discipline speech language pathologist   Recommendations   SLP - Next Appointment 08/03/18   F/u this pm for pt status to functionally participate in instrumental swallowing evaluation. Pt is awake upon entry, w/ family members present at bedside. Pt's family members report \"this is not normal for her\" regarding her AMS. Pt is noted w/ limited eye contact, inability to follow simple commands or verbalize functionally communication. SLP attempts to provide oral care, however pt would not open mouth for SLP to perform despite max cues and prompts from SLP. Spoon bowl w/ ice chip x1 provided, pt does not anticipate spoon bowl from SLP despite max cues and prompts. Per pt's overall a/a status, pt is felt unsafe for po intake at this time. SLP d/w Laure, via telephone, who requests discontinue of SLP order per Dr. Roberson.     No further SLP f/u at this time per order from Dr. Roberson.    Thank you for allowing me to participate in the care of your patient-  Kacie Cee M.S., CCC-SLP      "

## 2018-08-03 NOTE — PROGRESS NOTES
LOS: 4 days     Chief Complaint:  Pulmonology is following for respiratory failure    Subjective     Interval History: Ms. Yoo is resting in bed.  She was extubated yesterday and is doing well today.    History taken from: chart RN    Review of Systems:     Review of Systems - History  unobtainable from patient due to mental status      Objective     Vital Signs  Temp:  [99.1 °F (37.3 °C)-100.5 °F (38.1 °C)] 99.4 °F (37.4 °C)  Heart Rate:  [] 106  Resp:  [14-21] 21  BP: (134-180)/(75-98) 137/75  Body mass index is 31.01 kg/m².    Intake/Output Summary (Last 24 hours) at 08/03/18 0955  Last data filed at 08/03/18 0941   Gross per 24 hour   Intake              700 ml   Output             3815 ml   Net            -3115 ml     I/O this shift:  In: 300 [IV Piggyback:300]  Out: -     Physical Exam:  GENERAL APPEARANCE: chronically ill appearing, alert and cooperative, and appears to be in no acute distress.    HEAD: normocephalic.    EYES: PERRL    NECK: Neck supple.     CARDIAC: Normal S1 and S2. No S3, S4 or murmurs. Rhythm is regular. There is no peripheral edema, cyanosis or pallor. Extremities are warm and well perfused. Capillary refill is less than 2 seconds. No carotid bruits.    RESPIRATORY: Clear to auscultation and percussion without rales, rhonchi, wheezing or diminished breath sounds.    GI: Positive bowel sounds. Soft, nondistended, nontender.     MUSCULOSKELTAL: No significant deformity or joint abnormality. No edema. Peripheral pulses intact.     NEUROLOGICAL: Strength and sensation symmetric and intact throughout.     PSYCHIATRIC: unable to assess due to mental status.                Results Review:                I reviewed the patient's new clinical results.  I reviewed the patient's new imaging results and agree with the interpretation.    Results from last 7 days  Lab Units 08/03/18  0234 08/02/18  0036 08/01/18  0042   WBC 10*3/mm3 18.34* 12.07 10.42   HEMOGLOBIN g/dL 11.4* 10.2* 11.2*    PLATELETS 10*3/mm3 233 203 277       Results from last 7 days  Lab Units 08/03/18  0234 08/02/18  0036 08/01/18  1925 08/01/18  0042   SODIUM mmol/L 142 144  --  141   POTASSIUM mmol/L 3.6 3.5 3.5 3.3*   CHLORIDE mmol/L 105 106  --  106   CO2 mmol/L 29.1 30.2  --  25.2   BUN mg/dL 34* 50*  --  52*   CREATININE mg/dL 1.15 1.56*  --  2.18*   CALCIUM mg/dL 7.9 7.2*  --  7.7   GLUCOSE mg/dL 86 90  --  136*     Lab Results   Component Value Date    INR 0.96 10/25/2016    PROTIME 10.9 10/25/2016       Results from last 7 days  Lab Units 07/30/18  1341 07/28/18  1029   ALK PHOS U/L 114* 100   BILIRUBIN mg/dL 1.1 0.7   ALT (SGPT) U/L 34 30   AST (SGOT) U/L 35* 50*       Results from last 7 days  Lab Units 08/02/18  0926   PH, ARTERIAL pH units 7.519*   PO2 ART mm Hg 101.8*   PCO2, ARTERIAL mm Hg 40.8   HCO3 ART mmol/L 32.5*     Imaging Results (last 24 hours)     Procedure Component Value Units Date/Time    XR Chest 1 View [[id number]] Collected:  08/03/18 0833     Updated:  08/03/18 0943    Narrative:       XR CHEST 1 VW-     HISTORY:  Intubated; K66.8-Other specified disorders of peritoneum;  E87.2-Acidosis; N17.8-Other acute kidney failure; N18.9-Chronic kidney  disease, unspecified          COMPARISON: 07/31/2018.      TECHNIQUE: Single frontal view of the chest.     FINDINGS:     Bibasilar effusions and bibasilar atelectasis. Patient has been  extubated.  The cardiac silhouette is normal. The pulmonary vasculature is  unremarkable.  There is no evidence of an acute osseous abnormality.   There are no suspicious-appearing parenchymal soft tissue nodules.            Impression:       Bibasilar effusions and probably bibasilar atelectasis.         This report was finalized on 8/3/2018 9:40 AM by Dr. Dayton Rodriguez MD.                Medication Review:   Scheduled Medications:    cefdinir 300 mg Oral Q12H   chlorhexidine 15 mL Mouth/Throat Q12H   FLUoxetine 10 mg Oral Daily   FLUoxetine 20 mg Oral Daily   heparin (porcine)  5,000 Units Subcutaneous Q8H   metroNIDAZOLE 500 mg Intravenous Q8H   pantoprazole 40 mg Intravenous Q AM   potassium chloride 10 mEq Intravenous Q1H   pregabalin 100 mg Oral Q12H   sodium chloride 3 mL Intravenous Q8H     Continuous infusions:    fentaNYL (SUBLIMAZE) PCA 1500 mcg/30 mL syringe  Last Rate: Stopped (08/02/18 0216)   norepinephrine 0.02-0.3 mcg/kg/min Last Rate: Stopped (08/01/18 0851)   propofol 5-50 mcg/kg/min Last Rate: Stopped (08/02/18 0216)       Assessment/Plan      Neuro: Patient is alert and awake. She is confused this morning.  Continue to monitor.     Respiratory failure:  Unable to wean after surgery. Patient was extubated yesterday.  Sh eis doing well today.  Will continue supplemental oxygen.  She is on 40% HFNC.  Continue scheduled inhalants and PRN neb treatments.     ID: continue current antibiotics.    Microbiology Results (last 10 days)     Procedure Component Value - Date/Time    Culture, Routine - Swab, Abdominal Wall [[id number]]  (Abnormal)  (Susceptibility) Collected:  07/30/18 2024    Lab Status:  Final result Specimen:  Swab from Abdominal Wall Updated:  08/03/18 1000     Culture Scant growth (1+) Klebsiella oxytoca (A)     Gram Stain Result Rare (1+) WBCs seen    Susceptibility      Klebsiella oxytoca     [place]     Amikacin <=16 ug/ml Susceptible     Amoxicillin + Clavulanate <=8/4 ug/ml Susceptible     Ampicillin >16 ug/ml Resistant     Ampicillin + Sulbactam <=8/4 ug/ml Susceptible     Aztreonam <=8 ug/ml Susceptible     Cefazolin <=8 ug/ml Susceptible     Cefepime <=8 ug/ml Susceptible     Cefotaxime <=2 ug/ml Susceptible     Ceftazidime <=1 ug/ml Susceptible     Ceftriaxone <=8 ug/ml Susceptible     Cefuroxime sodium <=4 ug/ml Susceptible     Ciprofloxacin <=1 ug/ml Susceptible     Doripenem <=0.5 ug/ml Susceptible     Ertapenem <=1 ug/ml Susceptible     Gentamicin <=4 ug/ml Susceptible     Imipenem <=1 ug/ml Susceptible     Levofloxacin <=2 ug/ml Susceptible      Piperacillin + Tazobactam <=16 ug/ml Susceptible     Tetracycline <=4 ug/ml Susceptible     Tobramycin <=4 ug/ml Susceptible     Trimethoprim + Sulfamethoxazole <=2/38 ug/ml Susceptible                    Blood Culture - Blood, [[id number]]  (Normal) Collected:  07/30/18 1600    Lab Status:  Preliminary result Specimen:  Blood from Arm, Left Updated:  08/02/18 1715     Blood Culture No growth at 3 days    Blood Culture - Blood, [[id number]]  (Normal) Collected:  07/30/18 1558    Lab Status:  Preliminary result Specimen:  Blood from Arm, Right Updated:  08/02/18 1715     Blood Culture No growth at 3 days           Cardiac: hypertensive- morning meds given.  NSR.  Continuous ECG, BP and pulse ox monitoring. Maintain MAP > 65.    Renal failure: Creatinine continues to improve.  Nephrology is following.     Electrolytes:     Monitor levels, manage and replete per protocols.    Endocrine: monitor glucose targeting 140-180.     Ovarian cancer:  Oncology consulted.     GI: Continue GI prophylaxis.  SLP evaluation will be done today.     Exploratory laparotomy  was performed per Dr. Roberson.  Patient now has a colostopmy.  Continue management per surgery.    Patient is in pain- will increase her morphine to every 2 hours.        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 Correa, by RICARDO Kwon      I, Amando Correa M.D. attest that the above note accurately reflects the work and decisions made  by me.  Patient was seen and evaluated by Dr. Correa, including history of present illness, physical exam,  assessment, and treatment plan.  The above note was reviewed and edited by Dr. Correa. Cc time - 38

## 2018-08-03 NOTE — SIGNIFICANT NOTE
08/03/18 1010   Rehab Time/Intention   Evaluation Not Performed other (see comments)   Rehab Treatment   Discipline speech language pathologist   Recommendations   SLP - Next Appointment 08/03/18     Consult received. Chart reviewed. Pt is noted w/ significant h/o disorder or peritoneum, cancer, elevated CA-125, constipation, metastatic ovarian cancer, arthritis, renal insufficiency, depression and GERD. Pt is s/p laparotomy, right colectomy, end ileostomy on 7/30/18. Pt noted w/ intubation from 7/30/18 to 8/2/18 extubation @ 1018. Pt is currently on HF oxygen at 40L tolerating well. Pt is felt to most benefit from instrumental FEES to effectively determine swallowing fnx, determine safest/least restrictive level of po intake, determine candidacy for po intake 2/2 intubation. Pt is currently NPO at this time. Pt is awake upon entry, however pt is noted w/ moaning for primary communication. Pt is unable to verbalize, follow simple commands, imitate oral postures. Per pt's AMS, swallowing evaluation deferred this am. SLP will f/u this pm to assess pt candidacy to functionally participate in instrumental swallowing evaluation. D/w Laure BOWEN, who verbalizes understanding and agreement. Will contact SLP if overall status improves.     Thank you-  Kacie Cee M.S., CCC-SLP

## 2018-08-03 NOTE — NURSING NOTE
Dr. Roberson at bedside to assess midline incision, stoma, and NIKKI drain. States to leave midline incision staples open to air. States to pack the open, inferior portion of the incision with normal saline wet to dry. Incision is clean, dry, and intact. Packed open area and changed colostomy bag. Pt tolerated well. Will continue to monitor.

## 2018-08-03 NOTE — NURSING NOTE
Rec'd consult for possible DTI to intergluteal cleft.  Natural discoloration to this area noted.  No sign of DTI.

## 2018-08-03 NOTE — PLAN OF CARE
Problem: Pain, Acute (Adult)  Goal: Acceptable Pain Control/Comfort Level  Outcome: Ongoing (interventions implemented as appropriate)      Problem: Fall Risk (Adult)  Goal: Absence of Fall  Outcome: Ongoing (interventions implemented as appropriate)      Problem: Infection, Risk/Actual (Adult)  Goal: Infection Prevention/Resolution  Outcome: Ongoing (interventions implemented as appropriate)      Problem: Skin Injury Risk (Adult)  Goal: Skin Health and Integrity  Outcome: Ongoing (interventions implemented as appropriate)      Problem: Patient Care Overview  Goal: Plan of Care Review  Outcome: Ongoing (interventions implemented as appropriate)    Goal: Individualization and Mutuality  Outcome: Ongoing (interventions implemented as appropriate)    Goal: Discharge Needs Assessment  Outcome: Ongoing (interventions implemented as appropriate)      Problem: Breathing Pattern Ineffective (Adult)  Goal: Identify Related Risk Factors and Signs and Symptoms  Outcome: Ongoing (interventions implemented as appropriate)    Goal: Effective Oxygenation/Ventilation  Outcome: Ongoing (interventions implemented as appropriate)    Goal: Anxiety/Fear Reduction  Outcome: Ongoing (interventions implemented as appropriate)

## 2018-08-03 NOTE — NURSING NOTE
Patient received a meal tray of clear liquids.  Family attempted to get patient to drink some of the liquids but she refused.  Patient shut mouth and turned head from side to side refusing to take anything.  Encouraged family to not give up and try again later.

## 2018-08-03 NOTE — PROGRESS NOTES
Discharge Planning Assessment  MATTHEW Ferrell     Patient Name: Aminta Yoo  MRN: [id number]  Today's Date: 8/3/2018    Admit Date: 7/30/2018      Discharge Plan     Row Name 08/03/18 1353       Plan    Plan Pt was extubated on this date. Pt lives at home with her spouse and plans to return home at discharge. Pt will need a hospital bed arranged at discharge. Pt will need [facility] at discharge. Hospital bed and home health to be arranged at discharge with MD order. Possible hospice discussion. Palliative care is following. SS will continue to follow and assist as needed with discharge planning.     Patient/Family in Agreement with Plan yes     Yanet Esteves     Ommaya reservoir access and chemotherapy injection Procedure Note:  Indication:   Brain mets  CSF cytology  Intraventricular Chemo injection.      Consent: The patient was counseled regarding the procedure, it's indications, risks, potential complications and alternatives and any questions were answered. Informed Consent was obtained.   Premeds; given in clinic  Procedure:      The patient was placed in the sitting  position and anatomical landmarks were identified. The skin was prepped and area draped in the usual sterile fashion. Anesthesia was achieved using Emla cream. A 25 gauge butterfly needle was inserted in the reservoir, 11 ml Clear fluid was obtained,    Methotrexate (drug name) 10mg in 6 ml and 10 mg Thiotepa, Followed by 1 ml of preservative free NS was slowly infused in ommaya reservoir. A sterile dressing was applied.     The patient tolerated the procedure well, without difficulty.     Complications:   none     Post-procedure care and activity was reviewed with the patient.  CSF sample sent for Cell count and cytology, glucose and protein.  Patient next dose of Intraventricular chemotherapy is due next weeks.

## 2018-08-03 NOTE — PLAN OF CARE
Problem: Pain, Acute (Adult)  Goal: Acceptable Pain Control/Comfort Level   08/03/18 0031   Pain, Acute (Adult)   Acceptable Pain Control/Comfort Level making progress toward outcome       Problem: Fall Risk (Adult)  Goal: Absence of Fall   08/03/18 0031   Fall Risk (Adult)   Absence of Fall making progress toward outcome       Problem: Infection, Risk/Actual (Adult)  Goal: Infection Prevention/Resolution   08/03/18 0031   Infection, Risk/Actual (Adult)   Infection Prevention/Resolution making progress toward outcome       Problem: Skin Injury Risk (Adult)  Goal: Skin Health and Integrity   08/03/18 0031   Skin Injury Risk (Adult)   Skin Health and Integrity making progress toward outcome       Problem: Patient Care Overview  Goal: Plan of Care Review   08/03/18 0031   Coping/Psychosocial   Plan of Care Reviewed With patient;family   Plan of Care Review   Progress improving     Goal: Discharge Needs Assessment   08/03/18 0031   Discharge Needs Assessment   Concerns to be Addressed basic needs   Patient/Family Anticipates Transition to home with family   Patient/Family Anticipated Services at Transition none   Transportation Concerns car, none   Transportation Anticipated family or friend will provide   Anticipated Changes Related to Illness inability to care for self   Equipment Needed After Discharge none   Discharge Facility/Level of Care Needs rehabilitation facility   Current Discharge Risk chronically ill   Disability   Equipment Currently Used at Home rollator;wheelchair       Problem: Ventilation, Mechanical Invasive (Adult)  Goal: Signs and Symptoms of Listed Potential Problems Will be Absent, Minimized or Managed (Ventilation, Mechanical Invasive)   08/03/18 0031   Goal/Outcome Evaluation   Problems Assessed (Mechanical Ventilation, Invasive) all   Problems Present (Mech Vent, Invasive) immobility;situational response       Problem: Breathing Pattern Ineffective (Adult)  Goal: Effective  Oxygenation/Ventilation   08/03/18 0031   Breathing Pattern Ineffective (Adult)   Effective Oxygenation/Ventilation making progress toward outcome     Goal: Anxiety/Fear Reduction   08/03/18 0031   Breathing Pattern Ineffective (Adult)   Anxiety/Fear Reduction making progress toward outcome

## 2018-08-03 NOTE — PROGRESS NOTES
Chief complaint: Perforated colon     Patient extubated to high flow nasal cannula.  Doing well.  Complaint of pain.  Her stoma is functioning.  She continues to have leukocytosis and low grade fever but drain is serosanguinous.  Open portion of inferior incision c/d/i     Awake, somewhat confused  Regular rate and rhythm  Clear to auscultation bilaterally with diminished bases  Soft, appropriately tender, stoma viable and functioning with some dusky mucosa visible on the inferior portion of the stoma.  Midline incision clean dry and intact  NIKKI drains serosanguineous  Jamison catheter clear     78-year-old female with metastatic ovarian cancer and diffuse carcinomatosis who presented with perforated and ischemic transverse colon.  She underwent extended right colectomy with end ileostomy and abdominal washout.  -OOB to chair  -Once swallow evaluation complete initiate diet  -Continue antibiotics  -convert to oral antibiotics once able to swallow.

## 2018-08-03 NOTE — PROGRESS NOTES
"Palliative Care Progress Note    Date of Admission: 7/30/2018    Code Status:   Current Code Status     Date Active Code Status Order ID Comments User Context       7/30/2018 10:24 PM CPR [id number]  Darnell Roberson MD Inpatient       Questions for Current Code Status     Question Answer Comment    Code Status CPR     Medical Interventions (Level of Support Prior to Arrest) Full     Level Of Support Discussed With Next of Kin (If No Surrogate)         Advance Directive: None  Surrogate decision maker: [email or web address]    Subjective:  Con and  at bedside in CCU.  Pt alert in bed, but unable to follow commands or be verbal.  Pt appears uncomfortable.  She is moving feet, moving hands, and moaning at times.  I have asked if she is is hurting and pt is unable to answer.  Family seems to be in agreeance that she does look uncomfortable.     Reviewed current scheduled and prn medications for route, type, dose, and frequency.    fentaNYL (SUBLIMAZE) PCA 1500 mcg/30 mL syringe  Last Rate: Stopped (08/02/18 0216)   norepinephrine 0.02-0.3 mcg/kg/min Last Rate: Stopped (08/01/18 0851)   propofol 5-50 mcg/kg/min Last Rate: Stopped (08/02/18 0216)     •  acetaminophen  •  acetaminophen  •  haloperidol lactate  •  hydrALAZINE  •  Morphine  •  ondansetron **OR** ondansetron ODT **OR** ondansetron  •  potassium chloride **OR** potassium chloride **OR** potassium chloride  •  sodium chloride  •  Insert peripheral IV **AND** sodium chloride  •  sodium chloride    Objective:  PPS 20 /97   Pulse 90   Temp (!) 100.7 °F (38.2 °C) (Axillary)   Resp 13   Ht 175.3 cm (69\")   Wt 95.3 kg (210 lb)   SpO2 96%   BMI 31.01 kg/m²    Intake & Output (last day)       08/02 0701 - 08/03 0700 08/03 0701 - 08/04 0700    IV Piggyback 400 400    Total Intake(mL/kg) 400 (4.2) 400 (4.2)    Urine (mL/kg/hr) 2975 (1.3) 800 (1)    Drains 120     Stool 720     Total Output 3815 800    Net -3415 -400              Lab Results (last 24 " hours)     Procedure Component Value Units Date/Time    Culture, Routine - Swab, Abdominal Wall [[id number]]  (Abnormal)  (Susceptibility) Collected:  07/30/18 2024    Specimen:  Swab from Abdominal Wall Updated:  08/03/18 1000     Culture Scant growth (1+) Klebsiella oxytoca (A)     Gram Stain Result Rare (1+) WBCs seen    Susceptibility      Klebsiella oxytoca     [place]     Amikacin <=16 ug/ml Susceptible     Amoxicillin + Clavulanate <=8/4 ug/ml Susceptible     Ampicillin >16 ug/ml Resistant     Ampicillin + Sulbactam <=8/4 ug/ml Susceptible     Aztreonam <=8 ug/ml Susceptible     Cefazolin <=8 ug/ml Susceptible     Cefepime <=8 ug/ml Susceptible     Cefotaxime <=2 ug/ml Susceptible     Ceftazidime <=1 ug/ml Susceptible     Ceftriaxone <=8 ug/ml Susceptible     Cefuroxime sodium <=4 ug/ml Susceptible     Ciprofloxacin <=1 ug/ml Susceptible     Doripenem <=0.5 ug/ml Susceptible     Ertapenem <=1 ug/ml Susceptible     Gentamicin <=4 ug/ml Susceptible     Imipenem <=1 ug/ml Susceptible     Levofloxacin <=2 ug/ml Susceptible     Piperacillin + Tazobactam <=16 ug/ml Susceptible     Tetracycline <=4 ug/ml Susceptible     Tobramycin <=4 ug/ml Susceptible     Trimethoprim + Sulfamethoxazole <=2/38 ug/ml Susceptible                    Blood Gas, Venous [[id number]] Collected:  08/03/18 0816    Specimen:  Venous Blood Updated:  08/03/18 0823     Site Venous     pH, Venous 7.476 pH Units      pCO2, Venous 34.6 mm Hg      pO2, Venous 66.7 mm Hg      HCO3, Venous 25.0 mmol/L      Base Excess, Venous 1.7 mmol/L      O2 Saturation, Venous 93.3 %      Hemoglobin, Blood Gas 12.5 g/dL      Temperature 98.6 C      Barometric Pressure for Blood Gas 729 mmHg      Modality Cannula - High Flow     FIO2 40 %      Flow Rate 40 lpm     CBC & Differential [[id number]] Collected:  08/03/18 0234    Specimen:  Blood Updated:  08/03/18 0326    Narrative:       The following orders were created for panel order CBC & Differential.  Procedure                                Abnormality         Status                     ---------                               -----------         ------                     Manual Differential[[id number]]          Abnormal            Final result               Scan Slide[[id number]]                                       Final result               CBC Auto Differential[[id number]]        Abnormal            Final result                 Please view results for these tests on the individual orders.    CBC Auto Differential [[id number]]  (Abnormal) Collected:  08/03/18 0234    Specimen:  Blood Updated:  08/03/18 0326     WBC 18.34 (H) 10*3/mm3      RBC 4.12 (L) 10*6/mm3      Hemoglobin 11.4 (L) g/dL      Hematocrit 35.7 (L) %      MCV 86.7 fL      MCH 27.7 pg      MCHC 31.9 (L) g/dL      RDW 15.4 (H) %      RDW-SD 47.9 fl      MPV 11.4 (H) fL      Platelets 233 10*3/mm3     Scan Slide [[id number]] Collected:  08/03/18 0234    Specimen:  Blood Updated:  08/03/18 0326     Scan Slide --     Comment: See Manual Differential Results       Manual Differential [[id number]]  (Abnormal) Collected:  08/03/18 0234    Specimen:  Blood Updated:  08/03/18 0326     Neutrophil % 88.0 (H) %      Lymphocyte % 4.0 (L) %      Monocyte % 5.0 %      Bands %  1.0 %      Metamyelocyte % 2.0 (H) %      Neutrophils Absolute 16.32 (H) 10*3/mm3      Lymphocytes Absolute 0.73 (L) 10*3/mm3      Monocytes Absolute 0.92 (H) 10*3/mm3      Anisocytosis Slight/1+     Hypochromia Slight/1+     Platelet Morphology Normal    C-reactive Protein [[id number]]  (Abnormal) Collected:  08/03/18 0234    Specimen:  Blood Updated:  08/03/18 0324     C-Reactive Protein 35.15 (H) mg/dL     Basic Metabolic Panel [[id number]]  (Abnormal) Collected:  08/03/18 0234    Specimen:  Blood Updated:  08/03/18 0320     Glucose 86 mg/dL      BUN 34 (H) mg/dL      Creatinine 1.15 mg/dL      Sodium 142 mmol/L      Potassium 3.6 mmol/L      Chloride 105 mmol/L      CO2 29.1 mmol/L      Calcium 7.9 mg/dL       eGFR Non African Amer 45 (L) mL/min/1.73      BUN/Creatinine Ratio 29.6 (H)     Anion Gap 7.9 mmol/L     Narrative:       The MDRD GFR formula is only valid for adults with stable renal function between ages 18 and 70.    Osmolality, Calculated [[id number]]  (Normal) Collected:  08/03/18 0234    Specimen:  Blood Updated:  08/03/18 0320     Osmolality Calc 290.0 mOsm/kg     Blood Culture - Blood, [[id number]]  (Normal) Collected:  07/30/18 1600    Specimen:  Blood from Arm, Left Updated:  08/02/18 1715     Blood Culture No growth at 3 days    Blood Culture - Blood, [[id number]]  (Normal) Collected:  07/30/18 1558    Specimen:  Blood from Arm, Right Updated:  08/02/18 1715     Blood Culture No growth at 3 days        Imaging Results (last 24 hours)     Procedure Component Value Units Date/Time    XR Chest 1 View [[id number]] Collected:  08/03/18 0833     Updated:  08/03/18 0943    Narrative:       XR CHEST 1 VW-     HISTORY:  Intubated; K66.8-Other specified disorders of peritoneum;  E87.2-Acidosis; N17.8-Other acute kidney failure; N18.9-Chronic kidney  disease, unspecified          COMPARISON: 07/31/2018.      TECHNIQUE: Single frontal view of the chest.     FINDINGS:     Bibasilar effusions and bibasilar atelectasis. Patient has been  extubated.  The cardiac silhouette is normal. The pulmonary vasculature is  unremarkable.  There is no evidence of an acute osseous abnormality.   There are no suspicious-appearing parenchymal soft tissue nodules.            Impression:       Bibasilar effusions and probably bibasilar atelectasis.         This report was finalized on 8/3/2018 9:40 AM by Dr. Dayton Rodriguez MD.             Physical Exam:  Gen: moaning, moving hands/arms/legs sporadically.   Skin: warm, dry  Eyes: conjunctiva clear and moist  Resp/thorax: even effort, non labored, CTA   CV: RRR   ABD: soft, bowel sounds hypoactive  : wise to bedside drainage with good output  Ext: no edema, no redness  Neuro: alert, but  unable to verbalize or follow commands  Psych: Unable to assess due to mental status    Reviewed labs and diagnostic results.   No results found for: HGBA1C    Results from last 7 days  Lab Units 08/03/18  0234   WBC 10*3/mm3 18.34*   HEMOGLOBIN g/dL 11.4*   HEMATOCRIT % 35.7*   PLATELETS 10*3/mm3 233       Results from last 7 days  Lab Units 08/03/18  0234  07/30/18  1341   SODIUM mmol/L 142  < > 144   POTASSIUM mmol/L 3.6  < > 3.5   CHLORIDE mmol/L 105  < > 113*   CO2 mmol/L 29.1  < > 19.4*   BUN mg/dL 34*  < > 48*   CREATININE mg/dL 1.15  < > 2.54*   CALCIUM mg/dL 7.9  < > 9.1   BILIRUBIN mg/dL  --   --  1.1   ALK PHOS U/L  --   --  114*   ALT (SGPT) U/L  --   --  34   AST (SGOT) U/L  --   --  35*   GLUCOSE mg/dL 86  < > 103   < > = values in this interval not displayed.    Impression: 78 y.o. female with metastatic ovarian cancer with diffuse carcinomatosis and perforated transverse colon s/p extended right colectomy with end ileostomy on 7/30    Plan:   1.  Goals of Care   - Discussed best case and worst case scenarios with  and son.  They are fully aware of both the good and bad.  They were explained code statuses, and after [facility] [place], they still want pt to be a Full code.      2.  Pain   - Patient appeared uncomfortable.  Dr. Agosto and I spoke with nursing.  She is not as reactive to the morphine as she was initially.  Dr. Agosto spoke with Dr. Roberson and we have placed fentanyl patch and increased her PRN morphine and see if this won't aid in patient comfort.     3.  Agitation   -  Patient has nothing onboard for restlessness or agitation. Will add Haloidal PRN for agitation and see if pt becomes more comfortable or if this is helpful.         Time: 30 minutes   > 50% time spent in counseling and education concerning current orders for symptom management with Dr. Roberson, nursing, family     Leslie Mary Jo Borja, APRN  [phone]  08/03/18  3:02 PM

## 2018-08-04 NOTE — NURSING NOTE
Dr. Us at bedside speaking with patient's .   Dr. Us discussed code status extensively with patient's .   Code status changed to NO CPR, comfort measures only.

## 2018-08-04 NOTE — PLAN OF CARE
Problem: Pain, Acute (Adult)  Goal: Acceptable Pain Control/Comfort Level  Outcome: Ongoing (interventions implemented as appropriate)      Problem: Fall Risk (Adult)  Goal: Absence of Fall  Outcome: Ongoing (interventions implemented as appropriate)      Problem: Infection, Risk/Actual (Adult)  Goal: Infection Prevention/Resolution  Outcome: Ongoing (interventions implemented as appropriate)      Problem: Skin Injury Risk (Adult)  Goal: Skin Health and Integrity  Outcome: Ongoing (interventions implemented as appropriate)      Problem: Patient Care Overview  Goal: Plan of Care Review  Outcome: Ongoing (interventions implemented as appropriate)    Goal: Individualization and Mutuality  Outcome: Ongoing (interventions implemented as appropriate)    Goal: Discharge Needs Assessment  Outcome: Ongoing (interventions implemented as appropriate)    Goal: Interprofessional Rounds/Family Conf  Outcome: Ongoing (interventions implemented as appropriate)      Problem: Ventilation, Mechanical Invasive (Adult)  Goal: Signs and Symptoms of Listed Potential Problems Will be Absent, Minimized or Managed (Ventilation, Mechanical Invasive)  Outcome: Ongoing (interventions implemented as appropriate)      Problem: Breathing Pattern Ineffective (Adult)  Goal: Effective Oxygenation/Ventilation  Outcome: Ongoing (interventions implemented as appropriate)    Goal: Anxiety/Fear Reduction  Outcome: Ongoing (interventions implemented as appropriate)

## 2018-08-04 NOTE — PROGRESS NOTES
Nurse Practitioner - Brief Progress Note  PERMANENT  08/04/2018 09:48    Advanced ICU Care  [facility] - U - 10 - [place], [place] ([facility])    DENY MAE    Date of Service 08/04/2018 09:48    HPI/Events of Note Bedside reports /111. Medicated for pain , resting calmly .  -  raise hydralazine to 10 mg Q4PRN for SBP > 170   - may also use labetatol 10 mg Q4 H if hydralazine ineffective - also hydralazine shortage noted      Interventions Intermediate-Communication with other healthcare providers and/or family, Hypertension - evaluation and management, Medication change / dose adjustment        Electronically Signed by: Stephy Johns (NP) on 08/04/2018 09:51

## 2018-08-04 NOTE — PLAN OF CARE
Problem: Infection, Risk/Actual (Adult)  Goal: Infection Prevention/Resolution  Outcome: Ongoing (interventions implemented as appropriate)  Interventions in place surgical incision without s/s of infection. Pt. Temp. 99.1

## 2018-08-04 NOTE — PROGRESS NOTES
Ovarian cancer, post colostomy    She had more pain yesterday but is doing better now with some morphine. She had a low grade temp and BP was up a little. She seems comfortable now. Abdomen is soft and ostomy is putting out stool. NIKKI serous and wound ok. I discussed code status with the  and he has decided on DNR/comfort measures.

## 2018-08-04 NOTE — PROGRESS NOTES
Nutrition Services    Patient Name:  Aminta Yoo  YOB: 1940  MRN: [id number]  Admit Date:  7/30/2018    Day 5 Clear Liquids / NPO.  Will start Ensure Clear with trays if pt begins to take po.     Electronically signed by:  Radha Colindres RD  08/04/18 10:08 AM

## 2018-08-05 NOTE — PLAN OF CARE
Problem: Pain, Acute (Adult)  Goal: Acceptable Pain Control/Comfort Level  Outcome: Ongoing (interventions implemented as appropriate)      Problem: Fall Risk (Adult)  Goal: Absence of Fall  Outcome: Ongoing (interventions implemented as appropriate)      Problem: Infection, Risk/Actual (Adult)  Goal: Infection Prevention/Resolution  Outcome: Ongoing (interventions implemented as appropriate)      Problem: Skin Injury Risk (Adult)  Goal: Skin Health and Integrity  Outcome: Ongoing (interventions implemented as appropriate)      Problem: Patient Care Overview  Goal: Plan of Care Review  Outcome: Ongoing (interventions implemented as appropriate)   08/01/18 0353 08/03/18 0031 08/04/18 0800   Coping/Psychosocial   Plan of Care Reviewed With --  --  patient;spouse;son   Plan of Care Review   Progress --  improving --    OTHER   Outcome Summary Pt vitals WNL; off levophed at current time BP improving some. Pt has increased agitation with oral care and sedation decreased --  --        Problem: Ventilation, Mechanical Invasive (Adult)  Goal: Signs and Symptoms of Listed Potential Problems Will be Absent, Minimized or Managed (Ventilation, Mechanical Invasive)  Outcome: Ongoing (interventions implemented as appropriate)      Problem: Breathing Pattern Ineffective (Adult)  Goal: Effective Oxygenation/Ventilation  Outcome: Ongoing (interventions implemented as appropriate)    Goal: Anxiety/Fear Reduction  Outcome: Ongoing (interventions implemented as appropriate)

## 2018-08-05 NOTE — PLAN OF CARE
Problem: Pain, Acute (Adult)  Goal: Acceptable Pain Control/Comfort Level  Outcome: Ongoing (interventions implemented as appropriate)   08/05/18 1440   Pain, Acute (Adult)   Acceptable Pain Control/Comfort Level making progress toward outcome       Problem: Fall Risk (Adult)  Goal: Absence of Fall  Outcome: Ongoing (interventions implemented as appropriate)   08/05/18 1440   Fall Risk (Adult)   Absence of Fall making progress toward outcome       Problem: Skin Injury Risk (Adult)  Goal: Skin Health and Integrity  Outcome: Ongoing (interventions implemented as appropriate)   08/05/18 1440   Skin Injury Risk (Adult)   Skin Health and Integrity making progress toward outcome       Problem: Patient Care Overview  Goal: Plan of Care Review  Outcome: Ongoing (interventions implemented as appropriate)

## 2018-08-05 NOTE — PROGRESS NOTES
Metastatic ovarian cancer    She seems comfortable now and had no episodes last night. She is running a low grade temp and her WBC is staying up. BP is slightly elevated as well. She is no taking po but given her abdominal status she is not likely to. I would think a feeding tube would be more irritating and as she seems comfortable, tube feeding would just be more likely to agrevate things. If a private room becomes available, she will be transferred to the floor as she is DNR and comfort measures.

## 2018-08-06 NOTE — PROGRESS NOTES
Discharge Planning Assessment   [facility]ass[facility]     Patient Name: Aminta Yoo  MRN: [id number]  Today's Date: 2018    Admit Date: 2018    Discharge Plan     Row Name 18 1004       Plan    Patient/Family in Agreement with Plan yes    Final Discharge Disposition Code 20 -     Final Note Pt . No other needs identified.      Yanet Esteves

## 2018-08-06 NOTE — SIGNIFICANT NOTE
Called to room to increase oxygen since pt is not picking up on finger probe. Pt having periods of apnea 10-12 seconds

## 2018-08-06 NOTE — SIGNIFICANT NOTE
RN called with patients sat dropping with current settings, assessed the patient and titrated flow and FiO2 up at this time.  Patients sat still 89-90 at this time.

## 2018-08-06 NOTE — PLAN OF CARE
Problem: Pain, Acute (Adult)  Goal: Acceptable Pain Control/Comfort Level  Outcome: Ongoing (interventions implemented as appropriate)   08/06/18 0252   Pain, Acute (Adult)   Acceptable Pain Control/Comfort Level making progress toward outcome       Problem: Fall Risk (Adult)  Goal: Absence of Fall  Outcome: Ongoing (interventions implemented as appropriate)   08/06/18 0252   Fall Risk (Adult)   Absence of Fall making progress toward outcome       Problem: Infection, Risk/Actual (Adult)  Goal: Infection Prevention/Resolution  Outcome: Ongoing (interventions implemented as appropriate)   08/06/18 0252   Infection, Risk/Actual (Adult)   Infection Prevention/Resolution making progress toward outcome       Problem: Skin Injury Risk (Adult)  Goal: Skin Health and Integrity  Outcome: Ongoing (interventions implemented as appropriate)   08/06/18 0252   Skin Injury Risk (Adult)   Skin Health and Integrity making progress toward outcome       Problem: Patient Care Overview  Goal: Plan of Care Review  Outcome: Ongoing (interventions implemented as appropriate)   08/06/18 0252   Coping/Psychosocial   Plan of Care Reviewed With patient   Plan of Care Review   Progress no change     Goal: Discharge Needs Assessment  Outcome: Ongoing (interventions implemented as appropriate)   07/31/18 1545 08/03/18 0031   Discharge Needs Assessment   Concerns to be Addressed --  basic needs   Patient/Family Anticipates Transition to --  home with family   Patient/Family Anticipated Services at Transition --  none   Transportation Concerns --  car, none   Transportation Anticipated --  family or friend will provide   Anticipated Changes Related to Illness --  inability to care for self   Equipment Needed After Discharge --  none   Outpatient/Agency/Support Group Needs (Pt does not use home health at this time. If home health is needed at discharge, [facility] would be the pt's prefrence. ) --    Discharge Facility/Level of Care Needs  --  rehabilitation facility   Current Discharge Risk --  chronically ill   Disability   Equipment Currently Used at Home --  rollator;wheelchair     Goal: Interprofessional Rounds/Family Conf  Outcome: Ongoing (interventions implemented as appropriate)   08/01/18 0353   Interdisciplinary Rounds/Family Conf   Participants nursing;patient;pharmacy;respiratory therapy;physician       Problem: Ventilation, Mechanical Invasive (Adult)  Goal: Signs and Symptoms of Listed Potential Problems Will be Absent, Minimized or Managed (Ventilation, Mechanical Invasive)  Outcome: Ongoing (interventions implemented as appropriate)   08/03/18 0031 08/06/18 0252   Goal/Outcome Evaluation   Problems Assessed (Mechanical Ventilation, Invasive) --  all   Problems Present (Mech Vent, Invasive) immobility;situational response --        Problem: Breathing Pattern Ineffective (Adult)  Goal: Identify Related Risk Factors and Signs and Symptoms  Outcome: Ongoing (interventions implemented as appropriate)   08/03/18 0031   Breathing Pattern Ineffective (Adult)   Related Risk Factors (Breathing Pattern Ineffective) advanced age;fatigue;pain     Goal: Effective Oxygenation/Ventilation  Outcome: Ongoing (interventions implemented as appropriate)   08/06/18 0252   Breathing Pattern Ineffective (Adult)   Effective Oxygenation/Ventilation making progress toward outcome     Goal: Anxiety/Fear Reduction  Outcome: Ongoing (interventions implemented as appropriate)

## 2018-08-09 LAB — BACTERIA SPEC AEROBE CULT: NORMAL

## 2018-08-10 NOTE — DISCHARGE SUMMARY
Date of Discharge:  8/10/2018    Discharge Diagnosis:  secondary to respiratory failure, metastatic ovarian cancer    Presenting Problem/History of Present Illness  Pneumoperitoneum [K66.8]  Pneumoperitoneum [K66.8]       Hospital Course  Patient is a 78 y.o. female presented with known metastatic ovarian cancer.  She presented to the emergency department with significant free air lactic acidosis and sepsis.  After discussions with the family given her terminal diagnosis of metastatic ovarian cancer is discussed that we would undergo exploratory laparotomy with any indicated procedures.  At the time of surgery she was found to have a perforated transverse colon without ischemia and diffuse carcinomatosis.  Extended right colectomy was performed with end ileostomy and long Angella's pouch.  Postoperatively the patient was taken to the intensive care unit where over the course of the next several days she was weaned from the ventilator.  She was successfully extubated but failed to progress.  Her ileostomy is functioning but she had no oral intake and after discussions with palliative care and the family they decided given her terminal prognosis even after recovery from surgery comfort measures would be enacted.  The patient was transferred from the ICU to the floor and with confirmation is in place on 2018 at approximately 7:13 AM the patient .    Procedures Performed  Procedure(s):  LAPAROTOMY EXPLORATORY. EXTENDED RIGHT COLECTOMY. ILEOSTOMY.       Consults:   Consults     Date and Time Order Name Status Description    2018 0738 Inpatient Palliative Care MD Consult Completed           Discharge Disposition      Discharge Medications     Discharge Medications      ASK your doctor about these medications      Instructions Start Date   calcium carbonate 600 MG tablet  Commonly known as:  OS-FELIPE   600 mg, Oral, Daily      diclofenac 75 MG EC tablet  Commonly known as:  VOLTAREN   2  Times Daily      FLUoxetine 10 MG tablet  Commonly known as:  PROzac   10 mg, Oral, Daily, Prior to [facility] Admission, Patient was on: Takes 10 mg capsule & 20 mg capsule to = 30 mg daily      FLUoxetine 20 MG capsule  Commonly known as:  PROzac   20 mg, Oral, Daily, Prior to [facility] Admission, Patient was on: Takes 10 mg capsule & 20 mg capsule to = 30 mg daily      HYDROcodone-acetaminophen  MG per tablet  Commonly known as:  NORCO   1 tablet, Oral, 3 Times Daily      linaclotide 145 MCG capsule capsule  Commonly known as:  LINZESS   145 mcg, Oral, Every Morning Before Breakfast      NEXIUM 40 MG capsule  Generic drug:  esomeprazole   40 mg, Oral, Every Morning Before Breakfast      nitrofurantoin (macrocrystal-monohydrate) 100 MG capsule  Commonly known as:  MACROBID   100 mg, Oral, Daily      polyethylene glycol packet  Commonly known as:  MIRALAX   17 g, Oral, 3 Times Daily PRN      pregabalin 100 MG capsule  Commonly known as:  LYRICA   100 mg, Oral, 3 Times Daily      spironolactone 25 MG tablet  Commonly known as:  ALDACTONE   25 mg, 2 Times Daily               Follow-up Appointments  No future appointments.           Darnell Roberson MD  08/10/18  8:07 AM

## 2020-07-23 NOTE — PLAN OF CARE
Problem: Pain, Acute (Adult)  Goal: Acceptable Pain Control/Comfort Level  Outcome: Ongoing (interventions implemented as appropriate)      Problem: Fall Risk (Adult)  Goal: Absence of Fall  Outcome: Ongoing (interventions implemented as appropriate)      Problem: Infection, Risk/Actual (Adult)  Goal: Infection Prevention/Resolution  Outcome: Ongoing (interventions implemented as appropriate)      Problem: Skin Injury Risk (Adult)  Goal: Skin Health and Integrity  Outcome: Ongoing (interventions implemented as appropriate)      Problem: Patient Care Overview  Goal: Plan of Care Review  Outcome: Ongoing (interventions implemented as appropriate)    Goal: Discharge Needs Assessment  Outcome: Ongoing (interventions implemented as appropriate)    Goal: Interprofessional Rounds/Family Conf  Outcome: Ongoing (interventions implemented as appropriate)      Problem: Ventilation, Mechanical Invasive (Adult)  Goal: Signs and Symptoms of Listed Potential Problems Will be Absent, Minimized or Managed (Ventilation, Mechanical Invasive)  Outcome: Ongoing (interventions implemented as appropriate)         Electrodesiccation Text: The wound bed was treated with electrodesiccation after the biopsy was performed.

## 2021-04-21 NOTE — PLAN OF CARE
Problem: Pain, Acute (Adult)  Goal: Identify Related Risk Factors and Signs and Symptoms  Outcome: Outcome(s) achieved Date Met: 08/01/18    Goal: Acceptable Pain Control/Comfort Level  Outcome: Ongoing (interventions implemented as appropriate)      Problem: Fall Risk (Adult)  Goal: Identify Related Risk Factors and Signs and Symptoms  Outcome: Outcome(s) achieved Date Met: 08/01/18    Goal: Absence of Fall  Outcome: Ongoing (interventions implemented as appropriate)      Problem: Infection, Risk/Actual (Adult)  Goal: Identify Related Risk Factors and Signs and Symptoms  Outcome: Outcome(s) achieved Date Met: 08/01/18    Goal: Infection Prevention/Resolution  Outcome: Ongoing (interventions implemented as appropriate)      Problem: Skin Injury Risk (Adult)  Goal: Identify Related Risk Factors and Signs and Symptoms  Outcome: Outcome(s) achieved Date Met: 08/01/18    Goal: Skin Health and Integrity  Outcome: Ongoing (interventions implemented as appropriate)      Problem: Ventilation, Mechanical Invasive (Adult)  Goal: Signs and Symptoms of Listed Potential Problems Will be Absent, Minimized or Managed (Ventilation, Mechanical Invasive)  Outcome: Ongoing (interventions implemented as appropriate)         none

## (undated) DEVICE — SUT SILK 2/0 TIES 30IN SA85H

## (undated) DEVICE — PK BASIC 70

## (undated) DEVICE — SPNG GZ WOVN 4X4IN 12PLY 10/BX STRL

## (undated) DEVICE — SUT VIC 3/0 SH CR8 27IN DYED J784G

## (undated) DEVICE — DRSNG WND BORDR/ADHS NONADHR/GZ LF 4X14IN STRL

## (undated) DEVICE — TOTAL TRAY, 16FR 10ML SIL FOLEY, URN: Brand: MEDLINE

## (undated) DEVICE — APPL CHLORAPREP W/TINT 26ML ORNG

## (undated) DEVICE — HOLDER: Brand: DEROYAL

## (undated) DEVICE — SUT SILK 3/0 SH CR8 18IN C013D

## (undated) DEVICE — GLV SURG SENSICARE W/ALOE PF LF 7.5 STRL

## (undated) DEVICE — ENSEAL 20 CM SHAFT, LARGE JAW: Brand: ENSEAL X1

## (undated) DEVICE — Device

## (undated) DEVICE — SUT PDS 1 TP1 48IN Z880G BX/12

## (undated) DEVICE — DRN JP FLT NO TROC SIL 3/4PERF 10MM

## (undated) DEVICE — PAD GRND REM POLYHESIVE A/ DISP

## (undated) DEVICE — DBD-DRAPE,LAP,CHOLE,W/TROUGHS,STERILE: Brand: MEDLINE

## (undated) DEVICE — ENCORE® LATEX MICRO SIZE 8, STERILE LATEX POWDER-FREE SURGICAL GLOVE: Brand: ENCORE

## (undated) DEVICE — PROXIMATE RH ROTATING HEAD SKIN STAPLERS (35 WIDE) CONTAINS 35 STAINLESS STEEL STAPLES: Brand: PROXIMATE

## (undated) DEVICE — BNDG ADHS CURAD FLX/FABRC 2X4IN STRL LF

## (undated) DEVICE — JACKSON-PRATT 100CC BULB RESERVOIR: Brand: CARDINAL HEALTH